# Patient Record
Sex: FEMALE | Race: WHITE | HISPANIC OR LATINO | URBAN - METROPOLITAN AREA
[De-identification: names, ages, dates, MRNs, and addresses within clinical notes are randomized per-mention and may not be internally consistent; named-entity substitution may affect disease eponyms.]

---

## 2017-09-13 ENCOUNTER — EMERGENCY (EMERGENCY)
Facility: HOSPITAL | Age: 47
LOS: 1 days | Discharge: HOME | End: 2017-09-13
Attending: EMERGENCY MEDICINE | Admitting: EMERGENCY MEDICINE
Payer: MEDICAID

## 2017-09-13 VITALS
RESPIRATION RATE: 15 BRPM | HEART RATE: 74 BPM | TEMPERATURE: 98 F | OXYGEN SATURATION: 98 % | SYSTOLIC BLOOD PRESSURE: 114 MMHG

## 2017-09-13 VITALS
HEIGHT: 64 IN | SYSTOLIC BLOOD PRESSURE: 111 MMHG | TEMPERATURE: 98 F | RESPIRATION RATE: 16 BRPM | HEART RATE: 84 BPM | WEIGHT: 199.96 LBS | OXYGEN SATURATION: 97 % | DIASTOLIC BLOOD PRESSURE: 77 MMHG

## 2017-09-13 DIAGNOSIS — Z98.890 OTHER SPECIFIED POSTPROCEDURAL STATES: Chronic | ICD-10-CM

## 2017-09-13 DIAGNOSIS — Z98.89 OTHER SPECIFIED POSTPROCEDURAL STATES: Chronic | ICD-10-CM

## 2017-09-13 DIAGNOSIS — J45.909 UNSPECIFIED ASTHMA, UNCOMPLICATED: ICD-10-CM

## 2017-09-13 DIAGNOSIS — R10.32 LEFT LOWER QUADRANT PAIN: ICD-10-CM

## 2017-09-13 DIAGNOSIS — Z98.51 TUBAL LIGATION STATUS: Chronic | ICD-10-CM

## 2017-09-13 DIAGNOSIS — Z98.890 OTHER SPECIFIED POSTPROCEDURAL STATES: ICD-10-CM

## 2017-09-13 DIAGNOSIS — D25.9 LEIOMYOMA OF UTERUS, UNSPECIFIED: ICD-10-CM

## 2017-09-13 LAB — HCG SERPL-ACNC: <1 MIU/ML — SIGNIFICANT CHANGE UP

## 2017-09-13 PROCEDURE — 82248 BILIRUBIN DIRECT: CPT

## 2017-09-13 PROCEDURE — 84702 CHORIONIC GONADOTROPIN TEST: CPT

## 2017-09-13 PROCEDURE — 80053 COMPREHEN METABOLIC PANEL: CPT

## 2017-09-13 PROCEDURE — 99285 EMERGENCY DEPT VISIT HI MDM: CPT | Mod: 25

## 2017-09-13 PROCEDURE — 85027 COMPLETE CBC AUTOMATED: CPT

## 2017-09-13 PROCEDURE — 82150 ASSAY OF AMYLASE: CPT

## 2017-09-13 PROCEDURE — 83690 ASSAY OF LIPASE: CPT

## 2017-09-13 PROCEDURE — 96375 TX/PRO/DX INJ NEW DRUG ADDON: CPT

## 2017-09-13 PROCEDURE — 74177 CT ABD & PELVIS W/CONTRAST: CPT | Mod: 26

## 2017-09-13 PROCEDURE — 81001 URINALYSIS AUTO W/SCOPE: CPT

## 2017-09-13 PROCEDURE — 74177 CT ABD & PELVIS W/CONTRAST: CPT

## 2017-09-13 PROCEDURE — 36415 COLL VENOUS BLD VENIPUNCTURE: CPT

## 2017-09-13 PROCEDURE — 99284 EMERGENCY DEPT VISIT MOD MDM: CPT | Mod: 25

## 2017-09-13 PROCEDURE — 96374 THER/PROPH/DIAG INJ IV PUSH: CPT

## 2017-09-13 RX ORDER — ONDANSETRON 8 MG/1
4 TABLET, FILM COATED ORAL ONCE
Qty: 0 | Refills: 0 | Status: COMPLETED | OUTPATIENT
Start: 2017-09-13 | End: 2017-09-13

## 2017-09-13 RX ORDER — IOHEXOL 300 MG/ML
30 INJECTION, SOLUTION INTRAVENOUS ONCE
Qty: 0 | Refills: 0 | Status: COMPLETED | OUTPATIENT
Start: 2017-09-13 | End: 2017-09-13

## 2017-09-13 RX ORDER — MORPHINE SULFATE 50 MG/1
4 CAPSULE, EXTENDED RELEASE ORAL ONCE
Qty: 0 | Refills: 0 | Status: DISCONTINUED | OUTPATIENT
Start: 2017-09-13 | End: 2017-09-13

## 2017-09-13 RX ORDER — SODIUM CHLORIDE 9 MG/ML
1000 INJECTION INTRAMUSCULAR; INTRAVENOUS; SUBCUTANEOUS ONCE
Qty: 0 | Refills: 0 | Status: COMPLETED | OUTPATIENT
Start: 2017-09-13 | End: 2017-09-13

## 2017-09-13 RX ADMIN — ONDANSETRON 4 MILLIGRAM(S): 8 TABLET, FILM COATED ORAL at 08:07

## 2017-09-13 RX ADMIN — MORPHINE SULFATE 4 MILLIGRAM(S): 50 CAPSULE, EXTENDED RELEASE ORAL at 08:08

## 2017-09-13 RX ADMIN — MORPHINE SULFATE 4 MILLIGRAM(S): 50 CAPSULE, EXTENDED RELEASE ORAL at 12:08

## 2017-09-13 RX ADMIN — IOHEXOL 30 MILLILITER(S): 300 INJECTION, SOLUTION INTRAVENOUS at 08:09

## 2017-09-13 RX ADMIN — SODIUM CHLORIDE 2000 MILLILITER(S): 9 INJECTION INTRAMUSCULAR; INTRAVENOUS; SUBCUTANEOUS at 08:09

## 2017-09-13 NOTE — ED ADULT NURSE REASSESSMENT NOTE - NS ED NURSE REASSESS COMMENT FT1
pt to CT with transportation
report received from JEMMA Chambers at 0700, assumed pt care. pt c/o LLQ pain, tender on palpation. medicated with morphine and zofran, pt started drinking for CT 0800. will continue to monitor

## 2017-09-13 NOTE — ED PROVIDER NOTE - OBJECTIVE STATEMENT
47 yo white female with 4-days of gradual onset and constant LLQ abdominal pains with slight nausea but no vomiting. No fever, chills, diarrhea, dysuria or hematuria. LMP normal and on time

## 2017-09-13 NOTE — ED ADULT TRIAGE NOTE - CHIEF COMPLAINT QUOTE
left flank/LLQ spreading to pelvis pain with nausea no vomiting, pain spreadin and increasing over the last 4 days.

## 2017-09-13 NOTE — ED PROVIDER NOTE - CONSTITUTIONAL, MLM
normal... Well appearing, overweight white female, well nourished, awake, alert, oriented to person, place, time/situation and in no apparent distress.

## 2017-09-13 NOTE — ED PROVIDER NOTE - CARE PLAN
Principal Discharge DX:	Left lower quadrant pain  Secondary Diagnosis:	Uterine leiomyoma, unspecified location

## 2017-10-09 ENCOUNTER — EMERGENCY (EMERGENCY)
Facility: HOSPITAL | Age: 47
LOS: 1 days | Discharge: HOME | End: 2017-10-09
Attending: EMERGENCY MEDICINE | Admitting: EMERGENCY MEDICINE
Payer: MEDICAID

## 2017-10-09 VITALS
OXYGEN SATURATION: 96 % | SYSTOLIC BLOOD PRESSURE: 134 MMHG | TEMPERATURE: 98 F | DIASTOLIC BLOOD PRESSURE: 87 MMHG | RESPIRATION RATE: 16 BRPM | HEART RATE: 92 BPM

## 2017-10-09 VITALS
WEIGHT: 195.11 LBS | RESPIRATION RATE: 18 BRPM | HEIGHT: 64 IN | OXYGEN SATURATION: 95 % | TEMPERATURE: 98 F | SYSTOLIC BLOOD PRESSURE: 128 MMHG | DIASTOLIC BLOOD PRESSURE: 88 MMHG | HEART RATE: 92 BPM

## 2017-10-09 DIAGNOSIS — Z98.89 OTHER SPECIFIED POSTPROCEDURAL STATES: Chronic | ICD-10-CM

## 2017-10-09 DIAGNOSIS — Z98.890 OTHER SPECIFIED POSTPROCEDURAL STATES: Chronic | ICD-10-CM

## 2017-10-09 DIAGNOSIS — Z98.51 TUBAL LIGATION STATUS: Chronic | ICD-10-CM

## 2017-10-09 PROCEDURE — 93010 ELECTROCARDIOGRAM REPORT: CPT

## 2017-10-09 PROCEDURE — 99285 EMERGENCY DEPT VISIT HI MDM: CPT | Mod: 25

## 2017-10-09 PROCEDURE — 93005 ELECTROCARDIOGRAM TRACING: CPT

## 2017-10-09 PROCEDURE — 94640 AIRWAY INHALATION TREATMENT: CPT

## 2017-10-09 PROCEDURE — 99284 EMERGENCY DEPT VISIT MOD MDM: CPT

## 2017-10-09 PROCEDURE — 71020: CPT | Mod: 26

## 2017-10-09 PROCEDURE — 71046 X-RAY EXAM CHEST 2 VIEWS: CPT

## 2017-10-09 RX ORDER — PSEUDOEPHEDRINE HCL 30 MG
30 TABLET ORAL ONCE
Qty: 0 | Refills: 0 | Status: COMPLETED | OUTPATIENT
Start: 2017-10-09 | End: 2017-10-09

## 2017-10-09 RX ORDER — IPRATROPIUM/ALBUTEROL SULFATE 18-103MCG
3 AEROSOL WITH ADAPTER (GRAM) INHALATION
Qty: 84 | Refills: 0 | OUTPATIENT
Start: 2017-10-09 | End: 2017-10-16

## 2017-10-09 RX ORDER — IPRATROPIUM/ALBUTEROL SULFATE 18-103MCG
3 AEROSOL WITH ADAPTER (GRAM) INHALATION ONCE
Qty: 0 | Refills: 0 | Status: COMPLETED | OUTPATIENT
Start: 2017-10-09 | End: 2017-10-09

## 2017-10-09 RX ORDER — IPRATROPIUM/ALBUTEROL SULFATE 18-103MCG
3 AEROSOL WITH ADAPTER (GRAM) INHALATION
Qty: 0 | Refills: 0 | Status: COMPLETED | OUTPATIENT
Start: 2017-10-09 | End: 2017-10-09

## 2017-10-09 RX ADMIN — Medication 3 MILLILITER(S): at 10:26

## 2017-10-09 RX ADMIN — Medication 3 MILLILITER(S): at 10:44

## 2017-10-09 RX ADMIN — Medication 60 MILLIGRAM(S): at 10:26

## 2017-10-09 RX ADMIN — Medication 3 MILLILITER(S): at 11:29

## 2017-10-09 RX ADMIN — Medication 30 MILLIGRAM(S): at 10:26

## 2017-10-09 NOTE — ED PROVIDER NOTE - PROGRESS NOTE DETAILS
lungs clearer now just a faint end of exp advential sound pt feels much better will get peak flow rpt dose of duoneb x 1 monitor and dc

## 2017-10-09 NOTE — ED PROVIDER NOTE - ENMT, MLM
Airway patent, Nasal mucosa clear. Mouth with reddened mucosa. Throat has no vesicles, no oropharyngeal exudates and uvula is midline. nasal congestion, trace r maxillary sinus pressure to percussion

## 2017-10-09 NOTE — ED PROVIDER NOTE - CARE PLAN
Principal Discharge DX:	Moderate asthma with exacerbation, unspecified whether persistent  Secondary Diagnosis:	URI, acute

## 2017-10-09 NOTE — ED ADULT NURSE NOTE - OBJECTIVE STATEMENT
pt states she is having difficulty since this morning , has used her nebulizer without relief. pt states she was recently diagnosed with sinusitis. pt with productive cough

## 2017-10-09 NOTE — ED PROVIDER NOTE - OBJECTIVE STATEMENT
Pt is a 47 yo f whose pmd is dr kirkland hx of asthma obesity c sectionx 2, endometrial ablation and tonsil smokes socially including yesterday, no allergies  she and daughters began to have cough and cold sx yesterday. went to a christening and while there was consuming etoh and smoking.  that night her cough and cold sx were much worse. she tried otc cold med but slept poorly. and this am had trouble breathing with chest tightness despite use of her nebs an mdi.  bib family for franc

## 2017-10-13 DIAGNOSIS — E66.9 OBESITY, UNSPECIFIED: ICD-10-CM

## 2017-10-13 DIAGNOSIS — R06.02 SHORTNESS OF BREATH: ICD-10-CM

## 2017-10-13 DIAGNOSIS — F17.210 NICOTINE DEPENDENCE, CIGARETTES, UNCOMPLICATED: ICD-10-CM

## 2017-10-13 DIAGNOSIS — J45.909 UNSPECIFIED ASTHMA, UNCOMPLICATED: ICD-10-CM

## 2017-10-13 DIAGNOSIS — F90.9 ATTENTION-DEFICIT HYPERACTIVITY DISORDER, UNSPECIFIED TYPE: ICD-10-CM

## 2017-10-13 DIAGNOSIS — J45.41 MODERATE PERSISTENT ASTHMA WITH (ACUTE) EXACERBATION: ICD-10-CM

## 2017-10-13 DIAGNOSIS — N39.0 URINARY TRACT INFECTION, SITE NOT SPECIFIED: ICD-10-CM

## 2018-03-28 ENCOUNTER — EMERGENCY (EMERGENCY)
Facility: HOSPITAL | Age: 48
LOS: 1 days | Discharge: HOME | End: 2018-03-28
Attending: STUDENT IN AN ORGANIZED HEALTH CARE EDUCATION/TRAINING PROGRAM | Admitting: STUDENT IN AN ORGANIZED HEALTH CARE EDUCATION/TRAINING PROGRAM
Payer: MEDICAID

## 2018-03-28 VITALS
TEMPERATURE: 99 F | WEIGHT: 199.96 LBS | SYSTOLIC BLOOD PRESSURE: 141 MMHG | HEIGHT: 63 IN | RESPIRATION RATE: 18 BRPM | OXYGEN SATURATION: 98 % | HEART RATE: 100 BPM | DIASTOLIC BLOOD PRESSURE: 95 MMHG

## 2018-03-28 DIAGNOSIS — Z98.890 OTHER SPECIFIED POSTPROCEDURAL STATES: Chronic | ICD-10-CM

## 2018-03-28 DIAGNOSIS — Z98.89 OTHER SPECIFIED POSTPROCEDURAL STATES: Chronic | ICD-10-CM

## 2018-03-28 DIAGNOSIS — Z98.51 TUBAL LIGATION STATUS: Chronic | ICD-10-CM

## 2018-03-28 PROCEDURE — 99283 EMERGENCY DEPT VISIT LOW MDM: CPT

## 2018-03-28 RX ADMIN — Medication 40 MILLIGRAM(S): at 05:47

## 2018-03-28 NOTE — ED PROVIDER NOTE - PROGRESS NOTE DETAILS
Explained to patient that she has only taken meds for 1 dose and she must complete the course.  Can take NSAIDS for pain.  Also, will increase her dose of steroids and advised f/u with PMD. Patient expressed understanding

## 2018-03-28 NOTE — ED ADULT NURSE NOTE - OBJECTIVE STATEMENT
patient states she was treated for sinus infection today and was having a lot of discomfort to face and increased mucus that she could not sleep. Speaking clearly, no SOB

## 2018-03-28 NOTE — ED PROVIDER NOTE - OBJECTIVE STATEMENT
47 year old female with a history of chronic sinusitis presents with facial and nasal pain typical of her sinus infections.  Symptoms started yesterday and she went to urgent care.  She received augmentin, prednisone (40 mg for 4 days) and nasal spray. She took 1 dose of augment and prednisone so far. States that her pain has not improved.  She has persistent facial pain and rhinorrhea preventing her from sleeping.  No headache or blurry vision.  She feels like her steroids were under-dosed.  PMD Dr. Chaney

## 2018-03-28 NOTE — ED ADULT TRIAGE NOTE - CHIEF COMPLAINT QUOTE
"I have pain in my nose, I have a sinus infection I started augmentin, prednisone and nose spray yesterday and I still have sinus pressure"

## 2018-03-28 NOTE — ED PROVIDER NOTE - ENMT, MLM
Airway patent, Nasal mucosa boggy. Mouth with normal mucosa. Throat has no vesicles, no oropharyngeal exudates and uvula is midline.  Maxillary sinus pressure b/l

## 2018-03-28 NOTE — ED ADULT NURSE REASSESSMENT NOTE - NS ED NURSE REASSESS COMMENT FT1
patient was feeling somewhat better after using warm pack to face, and states she will call her primary MD today, does not to wait to sign discharge papers. Dr El spoke with patient regarding d/c prior to leaving. Patient d/c at this time well appearing, states she cannot stay as her  needs to go to work.

## 2018-04-15 ENCOUNTER — EMERGENCY (EMERGENCY)
Facility: HOSPITAL | Age: 48
LOS: 1 days | Discharge: HOME | End: 2018-04-15
Attending: EMERGENCY MEDICINE | Admitting: EMERGENCY MEDICINE
Payer: MEDICAID

## 2018-04-15 VITALS
RESPIRATION RATE: 18 BRPM | SYSTOLIC BLOOD PRESSURE: 132 MMHG | HEART RATE: 70 BPM | OXYGEN SATURATION: 96 % | DIASTOLIC BLOOD PRESSURE: 86 MMHG | TEMPERATURE: 99 F

## 2018-04-15 VITALS
HEART RATE: 78 BPM | HEIGHT: 64 IN | DIASTOLIC BLOOD PRESSURE: 73 MMHG | TEMPERATURE: 98 F | SYSTOLIC BLOOD PRESSURE: 134 MMHG | OXYGEN SATURATION: 99 % | WEIGHT: 199.96 LBS | RESPIRATION RATE: 18 BRPM

## 2018-04-15 DIAGNOSIS — Z98.890 OTHER SPECIFIED POSTPROCEDURAL STATES: Chronic | ICD-10-CM

## 2018-04-15 DIAGNOSIS — Z98.89 OTHER SPECIFIED POSTPROCEDURAL STATES: Chronic | ICD-10-CM

## 2018-04-15 DIAGNOSIS — Z98.51 TUBAL LIGATION STATUS: Chronic | ICD-10-CM

## 2018-04-15 LAB
ALBUMIN SERPL ELPH-MCNC: 3.3 G/DL — SIGNIFICANT CHANGE UP (ref 3.3–5)
ALP SERPL-CCNC: 71 U/L — SIGNIFICANT CHANGE UP (ref 40–120)
ALT FLD-CCNC: 20 U/L — SIGNIFICANT CHANGE UP (ref 12–78)
ANION GAP SERPL CALC-SCNC: 8 MMOL/L — SIGNIFICANT CHANGE UP (ref 5–17)
APPEARANCE UR: CLEAR — SIGNIFICANT CHANGE UP
AST SERPL-CCNC: 8 U/L — LOW (ref 15–37)
BILIRUB SERPL-MCNC: 0.1 MG/DL — LOW (ref 0.2–1.2)
BILIRUB UR-MCNC: NEGATIVE — SIGNIFICANT CHANGE UP
BUN SERPL-MCNC: 20 MG/DL — SIGNIFICANT CHANGE UP (ref 7–23)
CALCIUM SERPL-MCNC: 8.9 MG/DL — SIGNIFICANT CHANGE UP (ref 8.5–10.1)
CHLORIDE SERPL-SCNC: 105 MMOL/L — SIGNIFICANT CHANGE UP (ref 96–108)
CO2 SERPL-SCNC: 27 MMOL/L — SIGNIFICANT CHANGE UP (ref 22–31)
COLOR SPEC: YELLOW — SIGNIFICANT CHANGE UP
CREAT SERPL-MCNC: 1.3 MG/DL — SIGNIFICANT CHANGE UP (ref 0.5–1.3)
DIFF PNL FLD: NEGATIVE — SIGNIFICANT CHANGE UP
GLUCOSE SERPL-MCNC: 179 MG/DL — HIGH (ref 70–99)
GLUCOSE UR QL: NEGATIVE — SIGNIFICANT CHANGE UP
HCG SERPL-ACNC: <1 MIU/ML — SIGNIFICANT CHANGE UP
HCT VFR BLD CALC: 36.9 % — SIGNIFICANT CHANGE UP (ref 34.5–45)
HGB BLD-MCNC: 12.6 G/DL — SIGNIFICANT CHANGE UP (ref 11.5–15.5)
KETONES UR-MCNC: NEGATIVE — SIGNIFICANT CHANGE UP
LEUKOCYTE ESTERASE UR-ACNC: NEGATIVE — SIGNIFICANT CHANGE UP
MCHC RBC-ENTMCNC: 30.8 PG — SIGNIFICANT CHANGE UP (ref 27–34)
MCHC RBC-ENTMCNC: 34.3 GM/DL — SIGNIFICANT CHANGE UP (ref 32–36)
MCV RBC AUTO: 89.9 FL — SIGNIFICANT CHANGE UP (ref 80–100)
NITRITE UR-MCNC: NEGATIVE — SIGNIFICANT CHANGE UP
PH UR: 6.5 — SIGNIFICANT CHANGE UP (ref 5–8)
PLATELET # BLD AUTO: 242 K/UL — SIGNIFICANT CHANGE UP (ref 150–400)
POTASSIUM SERPL-MCNC: 4.1 MMOL/L — SIGNIFICANT CHANGE UP (ref 3.5–5.3)
POTASSIUM SERPL-SCNC: 4.1 MMOL/L — SIGNIFICANT CHANGE UP (ref 3.5–5.3)
PROT SERPL-MCNC: 6.9 G/DL — SIGNIFICANT CHANGE UP (ref 6–8.3)
PROT UR-MCNC: NEGATIVE — SIGNIFICANT CHANGE UP
RBC # BLD: 4.1 M/UL — SIGNIFICANT CHANGE UP (ref 3.8–5.2)
RBC # FLD: 12 % — SIGNIFICANT CHANGE UP (ref 10.3–14.5)
SODIUM SERPL-SCNC: 140 MMOL/L — SIGNIFICANT CHANGE UP (ref 135–145)
SP GR SPEC: 1.02 — SIGNIFICANT CHANGE UP (ref 1.01–1.02)
UROBILINOGEN FLD QL: NEGATIVE — SIGNIFICANT CHANGE UP
WBC # BLD: 12.9 K/UL — HIGH (ref 3.8–10.5)
WBC # FLD AUTO: 12.9 K/UL — HIGH (ref 3.8–10.5)

## 2018-04-15 PROCEDURE — 99284 EMERGENCY DEPT VISIT MOD MDM: CPT | Mod: 25

## 2018-04-15 PROCEDURE — 99285 EMERGENCY DEPT VISIT HI MDM: CPT

## 2018-04-15 PROCEDURE — 85027 COMPLETE CBC AUTOMATED: CPT

## 2018-04-15 PROCEDURE — 36415 COLL VENOUS BLD VENIPUNCTURE: CPT

## 2018-04-15 PROCEDURE — 80053 COMPREHEN METABOLIC PANEL: CPT

## 2018-04-15 PROCEDURE — 94640 AIRWAY INHALATION TREATMENT: CPT

## 2018-04-15 PROCEDURE — 81003 URINALYSIS AUTO W/O SCOPE: CPT

## 2018-04-15 PROCEDURE — 96376 TX/PRO/DX INJ SAME DRUG ADON: CPT

## 2018-04-15 PROCEDURE — 96374 THER/PROPH/DIAG INJ IV PUSH: CPT

## 2018-04-15 PROCEDURE — 76830 TRANSVAGINAL US NON-OB: CPT | Mod: 26

## 2018-04-15 PROCEDURE — 76830 TRANSVAGINAL US NON-OB: CPT

## 2018-04-15 PROCEDURE — 84702 CHORIONIC GONADOTROPIN TEST: CPT

## 2018-04-15 PROCEDURE — 96375 TX/PRO/DX INJ NEW DRUG ADDON: CPT

## 2018-04-15 RX ORDER — IPRATROPIUM/ALBUTEROL SULFATE 18-103MCG
3 AEROSOL WITH ADAPTER (GRAM) INHALATION ONCE
Qty: 0 | Refills: 0 | Status: COMPLETED | OUTPATIENT
Start: 2018-04-15 | End: 2018-04-15

## 2018-04-15 RX ORDER — SODIUM CHLORIDE 9 MG/ML
1000 INJECTION INTRAMUSCULAR; INTRAVENOUS; SUBCUTANEOUS ONCE
Qty: 0 | Refills: 0 | Status: COMPLETED | OUTPATIENT
Start: 2018-04-15 | End: 2018-04-15

## 2018-04-15 RX ORDER — ONDANSETRON 8 MG/1
4 TABLET, FILM COATED ORAL ONCE
Qty: 0 | Refills: 0 | Status: COMPLETED | OUTPATIENT
Start: 2018-04-15 | End: 2018-04-15

## 2018-04-15 RX ORDER — KETOROLAC TROMETHAMINE 30 MG/ML
30 SYRINGE (ML) INJECTION ONCE
Qty: 0 | Refills: 0 | Status: DISCONTINUED | OUTPATIENT
Start: 2018-04-15 | End: 2018-04-15

## 2018-04-15 RX ORDER — HYDROMORPHONE HYDROCHLORIDE 2 MG/ML
0.5 INJECTION INTRAMUSCULAR; INTRAVENOUS; SUBCUTANEOUS ONCE
Qty: 0 | Refills: 0 | Status: DISCONTINUED | OUTPATIENT
Start: 2018-04-15 | End: 2018-04-15

## 2018-04-15 RX ADMIN — ONDANSETRON 4 MILLIGRAM(S): 8 TABLET, FILM COATED ORAL at 21:25

## 2018-04-15 RX ADMIN — HYDROMORPHONE HYDROCHLORIDE 0.5 MILLIGRAM(S): 2 INJECTION INTRAMUSCULAR; INTRAVENOUS; SUBCUTANEOUS at 21:24

## 2018-04-15 RX ADMIN — Medication 3 MILLILITER(S): at 21:24

## 2018-04-15 RX ADMIN — Medication 30 MILLIGRAM(S): at 20:08

## 2018-04-15 RX ADMIN — HYDROMORPHONE HYDROCHLORIDE 0.5 MILLIGRAM(S): 2 INJECTION INTRAMUSCULAR; INTRAVENOUS; SUBCUTANEOUS at 21:39

## 2018-04-15 RX ADMIN — ONDANSETRON 4 MILLIGRAM(S): 8 TABLET, FILM COATED ORAL at 20:04

## 2018-04-15 RX ADMIN — Medication 30 MILLIGRAM(S): at 20:23

## 2018-04-15 RX ADMIN — SODIUM CHLORIDE 1000 MILLILITER(S): 9 INJECTION INTRAMUSCULAR; INTRAVENOUS; SUBCUTANEOUS at 20:05

## 2018-04-15 NOTE — ED ADULT NURSE NOTE - OBJECTIVE STATEMENT
Patient complaining of pain to right lower groin area radiating to the lower back after having intercourse in am. Pt. stated pain has increased throughout the day. Pt. stated she has a history of kidney stones and fibroid necrosis. Pt. stated she has had intermittent spotting when menstruating and noted decreased urine output today. Denies nausea, vomiting, diarrhea or fever.

## 2018-04-15 NOTE — ED ADULT NURSE REASSESSMENT NOTE - NS ED NURSE REASSESS COMMENT FT1
Awaiting urine from pt. Pt. stated she does not have to urinate at this time. Per Dr. Haq pt. provided with po fluids.

## 2018-04-15 NOTE — ED PROVIDER NOTE - PROGRESS NOTE DETAILS
patient feeling better, abdomen soft, labs, us discussed, states she will f/u with her own GYN, understands to return to ER for fever, severe pain, vomiting

## 2018-04-15 NOTE — ED PROVIDER NOTE - OBJECTIVE STATEMENT
47 female presents to ER c/o RLQ pain, radiating to groin/back after having sexual intercourse with significant other this morning, getting progressively worse with intermitant nausea, did not take any pain meds prior to coming to ER.

## 2018-04-15 NOTE — ED ADULT TRIAGE NOTE - CHIEF COMPLAINT QUOTE
Patient complaining of pain to right lower groin area radiating to the lower back with pain level 7/10 started this afternoon denies nausea, vomiting and diarrhea stated she had history of kidney stones.

## 2018-08-06 ENCOUNTER — TRANSCRIPTION ENCOUNTER (OUTPATIENT)
Age: 48
End: 2018-08-06

## 2019-07-30 ENCOUNTER — EMERGENCY (EMERGENCY)
Facility: HOSPITAL | Age: 49
LOS: 1 days | Discharge: HOME | End: 2019-07-30
Attending: EMERGENCY MEDICINE | Admitting: EMERGENCY MEDICINE
Payer: MEDICAID

## 2019-07-30 VITALS
OXYGEN SATURATION: 97 % | RESPIRATION RATE: 17 BRPM | DIASTOLIC BLOOD PRESSURE: 60 MMHG | HEIGHT: 64 IN | WEIGHT: 199.96 LBS | HEART RATE: 87 BPM | SYSTOLIC BLOOD PRESSURE: 104 MMHG | TEMPERATURE: 99 F

## 2019-07-30 VITALS
SYSTOLIC BLOOD PRESSURE: 120 MMHG | HEART RATE: 73 BPM | TEMPERATURE: 98 F | RESPIRATION RATE: 16 BRPM | OXYGEN SATURATION: 96 % | DIASTOLIC BLOOD PRESSURE: 84 MMHG

## 2019-07-30 DIAGNOSIS — Z98.89 OTHER SPECIFIED POSTPROCEDURAL STATES: Chronic | ICD-10-CM

## 2019-07-30 DIAGNOSIS — Z98.51 TUBAL LIGATION STATUS: Chronic | ICD-10-CM

## 2019-07-30 DIAGNOSIS — Z98.890 OTHER SPECIFIED POSTPROCEDURAL STATES: Chronic | ICD-10-CM

## 2019-07-30 LAB
ALBUMIN SERPL ELPH-MCNC: 3.3 G/DL — SIGNIFICANT CHANGE UP (ref 3.3–5)
ALP SERPL-CCNC: 62 U/L — SIGNIFICANT CHANGE UP (ref 40–120)
ALT FLD-CCNC: 22 U/L — SIGNIFICANT CHANGE UP (ref 12–78)
ANION GAP SERPL CALC-SCNC: 5 MMOL/L — SIGNIFICANT CHANGE UP (ref 5–17)
APPEARANCE UR: CLEAR — SIGNIFICANT CHANGE UP
AST SERPL-CCNC: 10 U/L — LOW (ref 15–37)
BASOPHILS # BLD AUTO: 0.06 K/UL — SIGNIFICANT CHANGE UP (ref 0–0.2)
BASOPHILS NFR BLD AUTO: 0.5 % — SIGNIFICANT CHANGE UP (ref 0–2)
BILIRUB SERPL-MCNC: 0.2 MG/DL — SIGNIFICANT CHANGE UP (ref 0.2–1.2)
BILIRUB UR-MCNC: NEGATIVE — SIGNIFICANT CHANGE UP
BUN SERPL-MCNC: 20 MG/DL — SIGNIFICANT CHANGE UP (ref 7–23)
CALCIUM SERPL-MCNC: 9.1 MG/DL — SIGNIFICANT CHANGE UP (ref 8.5–10.1)
CHLORIDE SERPL-SCNC: 105 MMOL/L — SIGNIFICANT CHANGE UP (ref 96–108)
CO2 SERPL-SCNC: 29 MMOL/L — SIGNIFICANT CHANGE UP (ref 22–31)
COLOR SPEC: YELLOW — SIGNIFICANT CHANGE UP
CREAT SERPL-MCNC: 0.95 MG/DL — SIGNIFICANT CHANGE UP (ref 0.5–1.3)
DIFF PNL FLD: NEGATIVE — SIGNIFICANT CHANGE UP
EOSINOPHIL # BLD AUTO: 0.62 K/UL — HIGH (ref 0–0.5)
EOSINOPHIL NFR BLD AUTO: 5.4 % — SIGNIFICANT CHANGE UP (ref 0–6)
GLUCOSE SERPL-MCNC: 153 MG/DL — HIGH (ref 70–99)
GLUCOSE UR QL: NEGATIVE — SIGNIFICANT CHANGE UP
HCT VFR BLD CALC: 39.8 % — SIGNIFICANT CHANGE UP (ref 34.5–45)
HGB BLD-MCNC: 13 G/DL — SIGNIFICANT CHANGE UP (ref 11.5–15.5)
IMM GRANULOCYTES NFR BLD AUTO: 1.5 % — SIGNIFICANT CHANGE UP (ref 0–1.5)
KETONES UR-MCNC: NEGATIVE — SIGNIFICANT CHANGE UP
LACTATE SERPL-SCNC: 1.8 MMOL/L — SIGNIFICANT CHANGE UP (ref 0.7–2)
LEUKOCYTE ESTERASE UR-ACNC: NEGATIVE — SIGNIFICANT CHANGE UP
LIDOCAIN IGE QN: 123 U/L — SIGNIFICANT CHANGE UP (ref 73–393)
LYMPHOCYTES # BLD AUTO: 2.34 K/UL — SIGNIFICANT CHANGE UP (ref 1–3.3)
LYMPHOCYTES # BLD AUTO: 20.6 % — SIGNIFICANT CHANGE UP (ref 13–44)
MCHC RBC-ENTMCNC: 29.7 PG — SIGNIFICANT CHANGE UP (ref 27–34)
MCHC RBC-ENTMCNC: 32.7 GM/DL — SIGNIFICANT CHANGE UP (ref 32–36)
MCV RBC AUTO: 90.9 FL — SIGNIFICANT CHANGE UP (ref 80–100)
MONOCYTES # BLD AUTO: 0.79 K/UL — SIGNIFICANT CHANGE UP (ref 0–0.9)
MONOCYTES NFR BLD AUTO: 6.9 % — SIGNIFICANT CHANGE UP (ref 2–14)
NEUTROPHILS # BLD AUTO: 7.4 K/UL — SIGNIFICANT CHANGE UP (ref 1.8–7.4)
NEUTROPHILS NFR BLD AUTO: 65.1 % — SIGNIFICANT CHANGE UP (ref 43–77)
NITRITE UR-MCNC: NEGATIVE — SIGNIFICANT CHANGE UP
NRBC # BLD: 0 /100 WBCS — SIGNIFICANT CHANGE UP (ref 0–0)
PH UR: 5 — SIGNIFICANT CHANGE UP (ref 5–8)
PLATELET # BLD AUTO: 299 K/UL — SIGNIFICANT CHANGE UP (ref 150–400)
POTASSIUM SERPL-MCNC: 4.4 MMOL/L — SIGNIFICANT CHANGE UP (ref 3.5–5.3)
POTASSIUM SERPL-SCNC: 4.4 MMOL/L — SIGNIFICANT CHANGE UP (ref 3.5–5.3)
PROT SERPL-MCNC: 6.6 G/DL — SIGNIFICANT CHANGE UP (ref 6–8.3)
PROT UR-MCNC: NEGATIVE — SIGNIFICANT CHANGE UP
RBC # BLD: 4.38 M/UL — SIGNIFICANT CHANGE UP (ref 3.8–5.2)
RBC # FLD: 13 % — SIGNIFICANT CHANGE UP (ref 10.3–14.5)
SODIUM SERPL-SCNC: 139 MMOL/L — SIGNIFICANT CHANGE UP (ref 135–145)
SP GR SPEC: 1.02 — SIGNIFICANT CHANGE UP (ref 1.01–1.02)
UROBILINOGEN FLD QL: NEGATIVE — SIGNIFICANT CHANGE UP
WBC # BLD: 11.38 K/UL — HIGH (ref 3.8–10.5)
WBC # FLD AUTO: 11.38 K/UL — HIGH (ref 3.8–10.5)

## 2019-07-30 PROCEDURE — 76856 US EXAM PELVIC COMPLETE: CPT | Mod: 26

## 2019-07-30 PROCEDURE — 81003 URINALYSIS AUTO W/O SCOPE: CPT

## 2019-07-30 PROCEDURE — 99284 EMERGENCY DEPT VISIT MOD MDM: CPT | Mod: 25

## 2019-07-30 PROCEDURE — 76856 US EXAM PELVIC COMPLETE: CPT

## 2019-07-30 PROCEDURE — 80053 COMPREHEN METABOLIC PANEL: CPT

## 2019-07-30 PROCEDURE — 83690 ASSAY OF LIPASE: CPT

## 2019-07-30 PROCEDURE — 96374 THER/PROPH/DIAG INJ IV PUSH: CPT | Mod: XU

## 2019-07-30 PROCEDURE — 96375 TX/PRO/DX INJ NEW DRUG ADDON: CPT

## 2019-07-30 PROCEDURE — 74177 CT ABD & PELVIS W/CONTRAST: CPT | Mod: 26

## 2019-07-30 PROCEDURE — 36415 COLL VENOUS BLD VENIPUNCTURE: CPT

## 2019-07-30 PROCEDURE — 96361 HYDRATE IV INFUSION ADD-ON: CPT

## 2019-07-30 PROCEDURE — 99285 EMERGENCY DEPT VISIT HI MDM: CPT

## 2019-07-30 PROCEDURE — 85027 COMPLETE CBC AUTOMATED: CPT

## 2019-07-30 PROCEDURE — 83605 ASSAY OF LACTIC ACID: CPT

## 2019-07-30 PROCEDURE — 84702 CHORIONIC GONADOTROPIN TEST: CPT

## 2019-07-30 PROCEDURE — 87086 URINE CULTURE/COLONY COUNT: CPT

## 2019-07-30 PROCEDURE — 74177 CT ABD & PELVIS W/CONTRAST: CPT

## 2019-07-30 RX ORDER — KETOROLAC TROMETHAMINE 30 MG/ML
30 SYRINGE (ML) INJECTION ONCE
Refills: 0 | Status: DISCONTINUED | OUTPATIENT
Start: 2019-07-30 | End: 2019-07-30

## 2019-07-30 RX ORDER — IOHEXOL 300 MG/ML
30 INJECTION, SOLUTION INTRAVENOUS ONCE
Refills: 0 | Status: COMPLETED | OUTPATIENT
Start: 2019-07-30 | End: 2019-07-30

## 2019-07-30 RX ORDER — ONDANSETRON 8 MG/1
4 TABLET, FILM COATED ORAL ONCE
Refills: 0 | Status: COMPLETED | OUTPATIENT
Start: 2019-07-30 | End: 2019-07-30

## 2019-07-30 RX ORDER — MORPHINE SULFATE 50 MG/1
4 CAPSULE, EXTENDED RELEASE ORAL ONCE
Refills: 0 | Status: DISCONTINUED | OUTPATIENT
Start: 2019-07-30 | End: 2019-07-30

## 2019-07-30 RX ORDER — SODIUM CHLORIDE 9 MG/ML
1000 INJECTION INTRAMUSCULAR; INTRAVENOUS; SUBCUTANEOUS ONCE
Refills: 0 | Status: COMPLETED | OUTPATIENT
Start: 2019-07-30 | End: 2019-07-30

## 2019-07-30 RX ADMIN — Medication 30 MILLIGRAM(S): at 22:35

## 2019-07-30 RX ADMIN — ONDANSETRON 4 MILLIGRAM(S): 8 TABLET, FILM COATED ORAL at 18:11

## 2019-07-30 RX ADMIN — MORPHINE SULFATE 4 MILLIGRAM(S): 50 CAPSULE, EXTENDED RELEASE ORAL at 18:11

## 2019-07-30 RX ADMIN — SODIUM CHLORIDE 1000 MILLILITER(S): 9 INJECTION INTRAMUSCULAR; INTRAVENOUS; SUBCUTANEOUS at 18:12

## 2019-07-30 RX ADMIN — SODIUM CHLORIDE 1000 MILLILITER(S): 9 INJECTION INTRAMUSCULAR; INTRAVENOUS; SUBCUTANEOUS at 19:15

## 2019-07-30 RX ADMIN — IOHEXOL 30 MILLILITER(S): 300 INJECTION, SOLUTION INTRAVENOUS at 18:11

## 2019-07-30 RX ADMIN — Medication 30 MILLIGRAM(S): at 22:20

## 2019-07-30 NOTE — ED PROVIDER NOTE - CARE PROVIDER_API CALL
Zac Adkins)  Obstetrics and Gynecology  [address] [place] [address]  Phone: [phone]  Fax: [phone]  Follow Up Time:

## 2019-07-30 NOTE — ED PROVIDER NOTE - ATTENDING CONTRIBUTION TO CARE
48y F hx fibroids, kidney stones here w RLQ pain initially umbilical and now radiating to back. Pain assoc w nausea. no fever. No VD. +urinary sx. Ddx includes but is not limited to: kidney stone, uti/pyelo, appy, ovarian pathology. Labs, sono, imaging, meds, re-eval.

## 2019-07-30 NOTE — ED PROVIDER NOTE - NS ED ATTENDING STATEMENT MOD
Patient made aware I have personally performed a face to face diagnostic evaluation on this patient. I have reviewed the ACP note and agree with the history, exam and plan of care, except as noted.

## 2019-07-30 NOTE — ED PROVIDER NOTE - PROGRESS NOTE DETAILS
pt is currently comfortable in ED, in no distress, CT reviewed and shows enlarge fibroid uterus.  Will perform US and monitor. pt is currently comfortable in ED, US shows large fibroid.  pt was advised to FU with GYN, pt states she has a hx of fibroid.  Discussed with patient results of work up, strict return precautions, and need for follow up.  Patient expressed understanding and agrees with plan.  Patient informed to return to the ER immediately for any problems, concerns, or recurrent/worsening symptoms. Pt also advised that ovaries are obstructed by enlarged uterus on sono so cannot comment on flow, however CT showed unremarkable adenxa BL which would speak against torsion as torsions usually occur in setting of large cyst/mass on ovary. Advised to FU with GYN and return if worsening pain. Currently comfortable after pain meds in ED.

## 2019-07-30 NOTE — ED PROVIDER NOTE - GASTROINTESTINAL, MLM
normoactive bowel sound, abdomen soft, tender to palpation on the RLQ with rebound tenderness, negative CVA tenderness noted.

## 2019-07-30 NOTE — ED ADULT NURSE NOTE - OBJECTIVE STATEMENT
pt with complaints of right sided abdominal pain from umbilicus down to LRQ and into groin also into back. pt states she has had some nausea but no vomiting.  pt denies pain with urination. pt states pain increases with movement.

## 2019-07-30 NOTE — ED PROVIDER NOTE - OBJECTIVE STATEMENT
49 y/o female, comes in c/o RLQ abdominal pain which started last night.  Pain is constant, currently an 8/10, started around the umbilicus before localizing to the RLQ and now radiates to the back.  pain worsened with movement, alleviated with resting, pt denies any fevers or chills, c/o nausea, denies any vomiting, took Tylenol with no relief.  pt c/o urinary urgency and frequency, denies any burning sensation on urination, denies any vaginal bleeding or discharge.  pt has a hx of kidney stones but states this feels a lot different.

## 2019-07-30 NOTE — ED ADULT NURSE REASSESSMENT NOTE - NS ED NURSE REASSESS COMMENT FT1
Received report from Melani EASTON. Patient resting and comfortable. Patient reports partial relief of pain. VSS. Safety and comfort measures provided and maintained.

## 2019-08-01 LAB
CULTURE RESULTS: SIGNIFICANT CHANGE UP
SPECIMEN SOURCE: SIGNIFICANT CHANGE UP

## 2019-08-02 ENCOUNTER — APPOINTMENT (OUTPATIENT)
Dept: OBGYN | Facility: CLINIC | Age: 49
End: 2019-08-02

## 2019-08-07 ENCOUNTER — TRANSCRIPTION ENCOUNTER (OUTPATIENT)
Age: 49
End: 2019-08-07

## 2019-08-07 ENCOUNTER — APPOINTMENT (OUTPATIENT)
Dept: OBGYN | Facility: CLINIC | Age: 49
End: 2019-08-07
Payer: MEDICAID

## 2019-08-07 DIAGNOSIS — Z01.419 ENCOUNTER FOR GYNECOLOGICAL EXAMINATION (GENERAL) (ROUTINE) W/OUT ABNORMAL FINDINGS: ICD-10-CM

## 2019-08-07 DIAGNOSIS — Z80.41 FAMILY HISTORY OF MALIGNANT NEOPLASM OF OVARY: ICD-10-CM

## 2019-08-07 PROCEDURE — 99203 OFFICE O/P NEW LOW 30 MIN: CPT | Mod: 25

## 2019-08-07 PROCEDURE — 99386 PREV VISIT NEW AGE 40-64: CPT

## 2019-08-08 LAB
C TRACH RRNA SPEC QL NAA+PROBE: NOT DETECTED
HPV HIGH+LOW RISK DNA PNL CVX: NOT DETECTED
N GONORRHOEA RRNA SPEC QL NAA+PROBE: NOT DETECTED
SOURCE TP AMPLIFICATION: NORMAL

## 2019-08-16 ENCOUNTER — TRANSCRIPTION ENCOUNTER (OUTPATIENT)
Age: 49
End: 2019-08-16

## 2019-08-21 ENCOUNTER — APPOINTMENT (OUTPATIENT)
Dept: OBGYN | Facility: CLINIC | Age: 49
End: 2019-08-21
Payer: MEDICAID

## 2019-08-21 PROCEDURE — 81025 URINE PREGNANCY TEST: CPT

## 2019-08-21 PROCEDURE — 99213 OFFICE O/P EST LOW 20 MIN: CPT | Mod: 25

## 2019-08-21 PROCEDURE — 58100 BIOPSY OF UTERUS LINING: CPT

## 2019-08-21 NOTE — PROCEDURE
[Endometrial Biopsy] : Endometrial biopsy [Irregular Bleeding] : irregular uterine bleeding [Risks] : risks [Benefits] : benefits [Alternatives] : alternatives [Patient] : patient [Infection] : infection [Bleeding] : bleeding [Allergic Reaction] : allergic reaction [Uterine Perforation] : uterine perforation [Pain] : pain [CONSENT OBTAINED] : written consent was obtained prior to the procedure. [Neg Pregnancy Test] : a pregnancy test was negative [Betadine] : Betadine [Pipelle] : a Pipelle endometrial suction curette [Easy Passage] : allowed easy passage of a uterine sound without dilation [Sent to Histology] : the specimen was place in buffered formalin and sent for pathlogy [Scant] : a scant [Tolerated Well] : the patient tolerated the procedure well [No Complications] : there were no complications [de-identified] : Marlene clamp

## 2019-08-21 NOTE — ASSESSMENT
[FreeTextEntry1] : Nothing per vagina x 1wk\par \par TVUS consistent with a 15wks fibroid uterus and b/l adnexa or EMS not visualized due to fibroids. Discussed with patient and aware plan is for hysterectomy and salpingectomy with possible oopherectomy if abnormalities are found intraop. Patient agreed and all questions were answered.\par \par RTO for preop counseling

## 2019-08-26 ENCOUNTER — RESULT REVIEW (OUTPATIENT)
Age: 49
End: 2019-08-26

## 2019-08-26 ENCOUNTER — OUTPATIENT (OUTPATIENT)
Dept: OUTPATIENT SERVICES | Facility: HOSPITAL | Age: 49
LOS: 1 days | End: 2019-08-26
Payer: MEDICAID

## 2019-08-26 VITALS
HEIGHT: 64 IN | SYSTOLIC BLOOD PRESSURE: 122 MMHG | HEART RATE: 81 BPM | OXYGEN SATURATION: 99 % | TEMPERATURE: 98 F | RESPIRATION RATE: 20 BRPM | DIASTOLIC BLOOD PRESSURE: 70 MMHG

## 2019-08-26 DIAGNOSIS — Z98.89 OTHER SPECIFIED POSTPROCEDURAL STATES: Chronic | ICD-10-CM

## 2019-08-26 DIAGNOSIS — Z98.890 OTHER SPECIFIED POSTPROCEDURAL STATES: Chronic | ICD-10-CM

## 2019-08-26 DIAGNOSIS — N93.9 ABNORMAL UTERINE AND VAGINAL BLEEDING, UNSPECIFIED: ICD-10-CM

## 2019-08-26 DIAGNOSIS — Z98.51 TUBAL LIGATION STATUS: Chronic | ICD-10-CM

## 2019-08-26 LAB
ANION GAP SERPL CALC-SCNC: 2 MMOL/L — LOW (ref 5–17)
APPEARANCE UR: ABNORMAL
APTT BLD: 30.7 SEC — SIGNIFICANT CHANGE UP (ref 27.5–36.3)
BASOPHILS # BLD AUTO: 0.03 K/UL — SIGNIFICANT CHANGE UP (ref 0–0.2)
BASOPHILS NFR BLD AUTO: 0.4 % — SIGNIFICANT CHANGE UP (ref 0–2)
BILIRUB UR-MCNC: NEGATIVE — SIGNIFICANT CHANGE UP
BUN SERPL-MCNC: 13 MG/DL — SIGNIFICANT CHANGE UP (ref 7–23)
CALCIUM SERPL-MCNC: 8.9 MG/DL — SIGNIFICANT CHANGE UP (ref 8.5–10.1)
CHLORIDE SERPL-SCNC: 107 MMOL/L — SIGNIFICANT CHANGE UP (ref 96–108)
CO2 SERPL-SCNC: 30 MMOL/L — SIGNIFICANT CHANGE UP (ref 22–31)
COLOR SPEC: YELLOW — SIGNIFICANT CHANGE UP
CREAT SERPL-MCNC: 0.76 MG/DL — SIGNIFICANT CHANGE UP (ref 0.5–1.3)
DIFF PNL FLD: ABNORMAL
EOSINOPHIL # BLD AUTO: 0.4 K/UL — SIGNIFICANT CHANGE UP (ref 0–0.5)
EOSINOPHIL NFR BLD AUTO: 5.5 % — SIGNIFICANT CHANGE UP (ref 0–6)
GLUCOSE SERPL-MCNC: 108 MG/DL — HIGH (ref 70–99)
GLUCOSE UR QL: NEGATIVE MG/DL — SIGNIFICANT CHANGE UP
HCT VFR BLD CALC: 39.4 % — SIGNIFICANT CHANGE UP (ref 34.5–45)
HGB BLD-MCNC: 12.8 G/DL — SIGNIFICANT CHANGE UP (ref 11.5–15.5)
IMM GRANULOCYTES NFR BLD AUTO: 0.7 % — SIGNIFICANT CHANGE UP (ref 0–1.5)
INR BLD: 1.08 RATIO — SIGNIFICANT CHANGE UP (ref 0.88–1.16)
KETONES UR-MCNC: NEGATIVE — SIGNIFICANT CHANGE UP
LEUKOCYTE ESTERASE UR-ACNC: NEGATIVE — SIGNIFICANT CHANGE UP
LYMPHOCYTES # BLD AUTO: 1.79 K/UL — SIGNIFICANT CHANGE UP (ref 1–3.3)
LYMPHOCYTES # BLD AUTO: 24.5 % — SIGNIFICANT CHANGE UP (ref 13–44)
MCHC RBC-ENTMCNC: 30.2 PG — SIGNIFICANT CHANGE UP (ref 27–34)
MCHC RBC-ENTMCNC: 32.5 GM/DL — SIGNIFICANT CHANGE UP (ref 32–36)
MCV RBC AUTO: 92.9 FL — SIGNIFICANT CHANGE UP (ref 80–100)
MONOCYTES # BLD AUTO: 0.49 K/UL — SIGNIFICANT CHANGE UP (ref 0–0.9)
MONOCYTES NFR BLD AUTO: 6.7 % — SIGNIFICANT CHANGE UP (ref 2–14)
NEUTROPHILS # BLD AUTO: 4.55 K/UL — SIGNIFICANT CHANGE UP (ref 1.8–7.4)
NEUTROPHILS NFR BLD AUTO: 62.2 % — SIGNIFICANT CHANGE UP (ref 43–77)
NITRITE UR-MCNC: NEGATIVE — SIGNIFICANT CHANGE UP
PH UR: 7 — SIGNIFICANT CHANGE UP (ref 5–8)
PLATELET # BLD AUTO: 239 K/UL — SIGNIFICANT CHANGE UP (ref 150–400)
POTASSIUM SERPL-MCNC: 4 MMOL/L — SIGNIFICANT CHANGE UP (ref 3.5–5.3)
POTASSIUM SERPL-SCNC: 4 MMOL/L — SIGNIFICANT CHANGE UP (ref 3.5–5.3)
PROT UR-MCNC: NEGATIVE MG/DL — SIGNIFICANT CHANGE UP
PROTHROM AB SERPL-ACNC: 12 SEC — SIGNIFICANT CHANGE UP (ref 10–12.9)
RBC # BLD: 4.24 M/UL — SIGNIFICANT CHANGE UP (ref 3.8–5.2)
RBC # FLD: 12.6 % — SIGNIFICANT CHANGE UP (ref 10.3–14.5)
SODIUM SERPL-SCNC: 139 MMOL/L — SIGNIFICANT CHANGE UP (ref 135–145)
SP GR SPEC: 1.01 — SIGNIFICANT CHANGE UP (ref 1.01–1.02)
UROBILINOGEN FLD QL: NEGATIVE MG/DL — SIGNIFICANT CHANGE UP
WBC # BLD: 7.31 K/UL — SIGNIFICANT CHANGE UP (ref 3.8–10.5)
WBC # FLD AUTO: 7.31 K/UL — SIGNIFICANT CHANGE UP (ref 3.8–10.5)

## 2019-08-26 PROCEDURE — 85025 COMPLETE CBC W/AUTO DIFF WBC: CPT

## 2019-08-26 PROCEDURE — 85730 THROMBOPLASTIN TIME PARTIAL: CPT

## 2019-08-26 PROCEDURE — 86850 RBC ANTIBODY SCREEN: CPT

## 2019-08-26 PROCEDURE — 85610 PROTHROMBIN TIME: CPT

## 2019-08-26 PROCEDURE — 83036 HEMOGLOBIN GLYCOSYLATED A1C: CPT

## 2019-08-26 PROCEDURE — 86901 BLOOD TYPING SEROLOGIC RH(D): CPT

## 2019-08-26 PROCEDURE — 93005 ELECTROCARDIOGRAM TRACING: CPT

## 2019-08-26 PROCEDURE — 36415 COLL VENOUS BLD VENIPUNCTURE: CPT

## 2019-08-26 PROCEDURE — 81001 URINALYSIS AUTO W/SCOPE: CPT

## 2019-08-26 PROCEDURE — G0463: CPT | Mod: 25

## 2019-08-26 PROCEDURE — 80048 BASIC METABOLIC PNL TOTAL CA: CPT

## 2019-08-26 PROCEDURE — 93010 ELECTROCARDIOGRAM REPORT: CPT

## 2019-08-26 PROCEDURE — 86900 BLOOD TYPING SEROLOGIC ABO: CPT

## 2019-08-26 RX ORDER — MONTELUKAST 4 MG/1
0 TABLET, CHEWABLE ORAL
Qty: 0 | Refills: 0 | DISCHARGE

## 2019-08-26 RX ORDER — FOLIC ACID 0.8 MG
0 TABLET ORAL
Qty: 0 | Refills: 0 | DISCHARGE

## 2019-08-26 RX ORDER — ALBUTEROL 90 UG/1
0 AEROSOL, METERED ORAL
Qty: 0 | Refills: 0 | DISCHARGE

## 2019-08-26 RX ORDER — ERGOCALCIFEROL 1.25 MG/1
0 CAPSULE ORAL
Qty: 0 | Refills: 0 | DISCHARGE

## 2019-08-26 NOTE — H&P PST ADULT - HISTORY OF PRESENT ILLNESS
49 y/o female with uterine fibroid. Complain of excessive menstrual bleeding. Scheduled for Total laparoscopic hysterectomy. 47 y/o female with uterine fibroid. Complain of excessive menstrual bleeding. Scheduled for Robotic Total laparoscopic hysterectomy, b/l salpingo-oophorectomy, possible supra cervical hysterectomy, exploratory laparotomy.

## 2019-08-26 NOTE — H&P PST ADULT - NSICDXPASTMEDICALHX_GEN_ALL_CORE_FT
PAST MEDICAL HISTORY:  ADHD (attention deficit hyperactivity disorder)     Asthma     Fibroids uterine    Menorrhagia     Obesity

## 2019-08-26 NOTE — H&P PST ADULT - ASSESSMENT
49 y/o female with uterine fibroid. Scheduled for Robotic Total laparoscopic hysterectomy, b/l salpingo-oophorectomy, possible supra cervical hysterectomy, exploratory laparotomy.  Plan  1. Stop all NSAIDS, herbal supplements and vitamins for 7 days.  2. NPO at midnight.  3. Use EZ sponges as directed  4. Labs, EKG done today. STAT urine pregnancy on admission.  5. PMD visit for optimization prior to surgery as per surgeon

## 2019-08-26 NOTE — H&P PST ADULT - NSICDXPASTSURGICALHX_GEN_ALL_CORE_FT
PAST SURGICAL HISTORY:  S/P  section     S/P endometrial ablation     S/P tonsillectomy     S/P tubal ligation

## 2019-08-27 DIAGNOSIS — N93.9 ABNORMAL UTERINE AND VAGINAL BLEEDING, UNSPECIFIED: ICD-10-CM

## 2019-08-27 PROBLEM — E66.9 OBESITY, UNSPECIFIED: Chronic | Status: ACTIVE | Noted: 2019-08-26

## 2019-08-27 LAB — HBA1C BLD-MCNC: 5.7 % — HIGH (ref 4–5.6)

## 2019-08-28 ENCOUNTER — APPOINTMENT (OUTPATIENT)
Dept: OBGYN | Facility: HOSPITAL | Age: 49
End: 2019-08-28

## 2019-08-30 ENCOUNTER — APPOINTMENT (OUTPATIENT)
Dept: FAMILY MEDICINE | Facility: CLINIC | Age: 49
End: 2019-08-30
Payer: MEDICAID

## 2019-08-30 VITALS
HEIGHT: 64 IN | HEART RATE: 92 BPM | WEIGHT: 202 LBS | TEMPERATURE: 98 F | OXYGEN SATURATION: 96 % | BODY MASS INDEX: 34.49 KG/M2

## 2019-08-30 VITALS — SYSTOLIC BLOOD PRESSURE: 110 MMHG | DIASTOLIC BLOOD PRESSURE: 65 MMHG

## 2019-08-30 PROCEDURE — 99203 OFFICE O/P NEW LOW 30 MIN: CPT

## 2019-09-03 ENCOUNTER — APPOINTMENT (OUTPATIENT)
Dept: FAMILY MEDICINE | Facility: CLINIC | Age: 49
End: 2019-09-03

## 2019-09-03 PROBLEM — Z80.41 FAMILY HISTORY OF MALIGNANT NEOPLASM OF OVARY: Status: ACTIVE | Noted: 2019-09-03

## 2019-09-03 NOTE — REVIEW OF SYSTEMS
[Pelvic Pain] : pelvic pain [Abn Vag Bleeding] : abnormal vaginal bleeding [Breast Pain] : breast pain [Nl] : Endocrine

## 2019-09-03 NOTE — PHYSICAL EXAM
[Awake] : awake [Alert] : alert [Soft] : soft [Oriented x3] : oriented to person, place, and time [Normal] : cervix [No Bleeding] : there was no active vaginal bleeding [Pap Obtained] : a Pap smear was performed [Enlarged ___ wks] : enlarged [unfilled] ~Uweeks [Uterine Adnexae] : were not tender and not enlarged [Acute Distress] : no acute distress [Mass] : no breast mass [Nipple Discharge] : no nipple discharge [Axillary LAD] : no axillary lymphadenopathy [Tender] : non tender

## 2019-09-06 ENCOUNTER — APPOINTMENT (OUTPATIENT)
Dept: OBGYN | Facility: HOSPITAL | Age: 49
End: 2019-09-06

## 2019-09-10 ENCOUNTER — APPOINTMENT (OUTPATIENT)
Dept: OBGYN | Facility: HOSPITAL | Age: 49
End: 2019-09-10

## 2019-09-10 ENCOUNTER — OUTPATIENT (OUTPATIENT)
Dept: INPATIENT UNIT | Facility: HOSPITAL | Age: 49
LOS: 1 days | Discharge: HOME | End: 2019-09-10
Payer: MEDICAID

## 2019-09-10 ENCOUNTER — RESULT REVIEW (OUTPATIENT)
Age: 49
End: 2019-09-10

## 2019-09-10 VITALS
DIASTOLIC BLOOD PRESSURE: 73 MMHG | TEMPERATURE: 99 F | WEIGHT: 201.94 LBS | HEIGHT: 64 IN | SYSTOLIC BLOOD PRESSURE: 135 MMHG | RESPIRATION RATE: 16 BRPM | OXYGEN SATURATION: 99 % | HEART RATE: 68 BPM

## 2019-09-10 DIAGNOSIS — F17.210 NICOTINE DEPENDENCE, CIGARETTES, UNCOMPLICATED: ICD-10-CM

## 2019-09-10 DIAGNOSIS — Z88.2 ALLERGY STATUS TO SULFONAMIDES: ICD-10-CM

## 2019-09-10 DIAGNOSIS — Z98.89 OTHER SPECIFIED POSTPROCEDURAL STATES: Chronic | ICD-10-CM

## 2019-09-10 DIAGNOSIS — N84.0 POLYP OF CORPUS UTERI: ICD-10-CM

## 2019-09-10 DIAGNOSIS — Z98.890 OTHER SPECIFIED POSTPROCEDURAL STATES: Chronic | ICD-10-CM

## 2019-09-10 DIAGNOSIS — D25.9 LEIOMYOMA OF UTERUS, UNSPECIFIED: ICD-10-CM

## 2019-09-10 DIAGNOSIS — N72 INFLAMMATORY DISEASE OF CERVIX UTERI: ICD-10-CM

## 2019-09-10 DIAGNOSIS — Z82.49 FAMILY HISTORY OF ISCHEMIC HEART DISEASE AND OTHER DISEASES OF THE CIRCULATORY SYSTEM: ICD-10-CM

## 2019-09-10 DIAGNOSIS — N93.9 ABNORMAL UTERINE AND VAGINAL BLEEDING, UNSPECIFIED: ICD-10-CM

## 2019-09-10 DIAGNOSIS — Z98.51 TUBAL LIGATION STATUS: ICD-10-CM

## 2019-09-10 DIAGNOSIS — E66.9 OBESITY, UNSPECIFIED: ICD-10-CM

## 2019-09-10 DIAGNOSIS — J45.909 UNSPECIFIED ASTHMA, UNCOMPLICATED: ICD-10-CM

## 2019-09-10 DIAGNOSIS — N92.0 EXCESSIVE AND FREQUENT MENSTRUATION WITH REGULAR CYCLE: ICD-10-CM

## 2019-09-10 DIAGNOSIS — N80.0 ENDOMETRIOSIS OF UTERUS: ICD-10-CM

## 2019-09-10 DIAGNOSIS — Z98.51 TUBAL LIGATION STATUS: Chronic | ICD-10-CM

## 2019-09-10 DIAGNOSIS — F98.5 ADULT ONSET FLUENCY DISORDER: ICD-10-CM

## 2019-09-10 LAB — HCG UR QL: NEGATIVE — SIGNIFICANT CHANGE UP

## 2019-09-10 PROCEDURE — S2900 ROBOTIC SURGICAL SYSTEM: CPT | Mod: NC

## 2019-09-10 PROCEDURE — 85025 COMPLETE CBC W/AUTO DIFF WBC: CPT

## 2019-09-10 PROCEDURE — 58573 TLH W/T/O UTERUS OVER 250 G: CPT | Mod: AS

## 2019-09-10 PROCEDURE — 58573 TLH W/T/O UTERUS OVER 250 G: CPT

## 2019-09-10 PROCEDURE — 86850 RBC ANTIBODY SCREEN: CPT

## 2019-09-10 PROCEDURE — S2900: CPT

## 2019-09-10 PROCEDURE — 86901 BLOOD TYPING SEROLOGIC RH(D): CPT

## 2019-09-10 PROCEDURE — C1889: CPT

## 2019-09-10 PROCEDURE — 81025 URINE PREGNANCY TEST: CPT

## 2019-09-10 PROCEDURE — 86900 BLOOD TYPING SEROLOGIC ABO: CPT

## 2019-09-10 PROCEDURE — 88307 TISSUE EXAM BY PATHOLOGIST: CPT | Mod: 26

## 2019-09-10 PROCEDURE — 36415 COLL VENOUS BLD VENIPUNCTURE: CPT

## 2019-09-10 PROCEDURE — 88307 TISSUE EXAM BY PATHOLOGIST: CPT

## 2019-09-10 PROCEDURE — 82962 GLUCOSE BLOOD TEST: CPT

## 2019-09-10 RX ORDER — IBUPROFEN 200 MG
800 TABLET ORAL EVERY 6 HOURS
Refills: 0 | Status: DISCONTINUED | OUTPATIENT
Start: 2019-09-10 | End: 2019-09-11

## 2019-09-10 RX ORDER — ONDANSETRON 8 MG/1
4 TABLET, FILM COATED ORAL EVERY 6 HOURS
Refills: 0 | Status: DISCONTINUED | OUTPATIENT
Start: 2019-09-10 | End: 2019-09-11

## 2019-09-10 RX ORDER — OXYCODONE HYDROCHLORIDE 5 MG/1
5 TABLET ORAL EVERY 4 HOURS
Refills: 0 | Status: DISCONTINUED | OUTPATIENT
Start: 2019-09-10 | End: 2019-09-11

## 2019-09-10 RX ORDER — ACETAMINOPHEN 500 MG
650 TABLET ORAL EVERY 6 HOURS
Refills: 0 | Status: DISCONTINUED | OUTPATIENT
Start: 2019-09-10 | End: 2019-09-11

## 2019-09-10 RX ORDER — ACETAMINOPHEN 500 MG
1000 TABLET ORAL ONCE
Refills: 0 | Status: DISCONTINUED | OUTPATIENT
Start: 2019-09-10 | End: 2019-09-10

## 2019-09-10 RX ORDER — TRAZODONE HCL 50 MG
100 TABLET ORAL ONCE
Refills: 0 | Status: COMPLETED | OUTPATIENT
Start: 2019-09-10 | End: 2019-09-10

## 2019-09-10 RX ORDER — FENTANYL CITRATE 50 UG/ML
50 INJECTION INTRAVENOUS
Refills: 0 | Status: DISCONTINUED | OUTPATIENT
Start: 2019-09-10 | End: 2019-09-10

## 2019-09-10 RX ORDER — HEPARIN SODIUM 5000 [USP'U]/ML
5000 INJECTION INTRAVENOUS; SUBCUTANEOUS EVERY 12 HOURS
Refills: 0 | Status: DISCONTINUED | OUTPATIENT
Start: 2019-09-10 | End: 2019-09-10

## 2019-09-10 RX ORDER — HEPARIN SODIUM 5000 [USP'U]/ML
5000 INJECTION INTRAVENOUS; SUBCUTANEOUS EVERY 12 HOURS
Refills: 0 | Status: DISCONTINUED | OUTPATIENT
Start: 2019-09-10 | End: 2019-09-11

## 2019-09-10 RX ORDER — OXYCODONE HYDROCHLORIDE 5 MG/1
10 TABLET ORAL ONCE
Refills: 0 | Status: DISCONTINUED | OUTPATIENT
Start: 2019-09-10 | End: 2019-09-10

## 2019-09-10 RX ORDER — HEPARIN SODIUM 5000 [USP'U]/ML
5000 INJECTION INTRAVENOUS; SUBCUTANEOUS ONCE
Refills: 0 | Status: COMPLETED | OUTPATIENT
Start: 2019-09-10 | End: 2019-09-10

## 2019-09-10 RX ORDER — SODIUM CHLORIDE 9 MG/ML
1000 INJECTION, SOLUTION INTRAVENOUS
Refills: 0 | Status: DISCONTINUED | OUTPATIENT
Start: 2019-09-10 | End: 2019-09-10

## 2019-09-10 RX ADMIN — OXYCODONE HYDROCHLORIDE 5 MILLIGRAM(S): 5 TABLET ORAL at 18:43

## 2019-09-10 RX ADMIN — FENTANYL CITRATE 50 MICROGRAM(S): 50 INJECTION INTRAVENOUS at 11:35

## 2019-09-10 RX ADMIN — OXYCODONE HYDROCHLORIDE 5 MILLIGRAM(S): 5 TABLET ORAL at 16:23

## 2019-09-10 RX ADMIN — HEPARIN SODIUM 5000 UNIT(S): 5000 INJECTION INTRAVENOUS; SUBCUTANEOUS at 17:02

## 2019-09-10 RX ADMIN — Medication 800 MILLIGRAM(S): at 17:03

## 2019-09-10 RX ADMIN — FENTANYL CITRATE 50 MICROGRAM(S): 50 INJECTION INTRAVENOUS at 11:08

## 2019-09-10 RX ADMIN — Medication 650 MILLIGRAM(S): at 17:03

## 2019-09-10 RX ADMIN — Medication 800 MILLIGRAM(S): at 12:52

## 2019-09-10 RX ADMIN — SODIUM CHLORIDE 75 MILLILITER(S): 9 INJECTION, SOLUTION INTRAVENOUS at 12:26

## 2019-09-10 RX ADMIN — OXYCODONE HYDROCHLORIDE 5 MILLIGRAM(S): 5 TABLET ORAL at 14:05

## 2019-09-10 RX ADMIN — OXYCODONE HYDROCHLORIDE 5 MILLIGRAM(S): 5 TABLET ORAL at 22:45

## 2019-09-10 RX ADMIN — Medication 800 MILLIGRAM(S): at 17:34

## 2019-09-10 RX ADMIN — OXYCODONE HYDROCHLORIDE 10 MILLIGRAM(S): 5 TABLET ORAL at 11:04

## 2019-09-10 RX ADMIN — FENTANYL CITRATE 50 MICROGRAM(S): 50 INJECTION INTRAVENOUS at 11:34

## 2019-09-10 RX ADMIN — Medication 100 MILLIGRAM(S): at 21:49

## 2019-09-10 RX ADMIN — OXYCODONE HYDROCHLORIDE 10 MILLIGRAM(S): 5 TABLET ORAL at 12:53

## 2019-09-10 RX ADMIN — Medication 800 MILLIGRAM(S): at 12:24

## 2019-09-10 RX ADMIN — Medication 650 MILLIGRAM(S): at 12:52

## 2019-09-10 RX ADMIN — OXYCODONE HYDROCHLORIDE 5 MILLIGRAM(S): 5 TABLET ORAL at 21:47

## 2019-09-10 RX ADMIN — HEPARIN SODIUM 5000 UNIT(S): 5000 INJECTION INTRAVENOUS; SUBCUTANEOUS at 07:32

## 2019-09-10 RX ADMIN — Medication 650 MILLIGRAM(S): at 17:34

## 2019-09-10 RX ADMIN — Medication 650 MILLIGRAM(S): at 12:25

## 2019-09-10 RX ADMIN — FENTANYL CITRATE 50 MICROGRAM(S): 50 INJECTION INTRAVENOUS at 10:58

## 2019-09-10 NOTE — ASU PREOP CHECKLIST - CHLOROHEXIDINE WASH 2
FONT COLOR=\"[id number]\">SKYLER FRANCO  : 1950  ACCOUNT:  [id number]  [phone]  Breo Ellipta          [facility]  as directed        aMson Hoover M.D.    KATELYN/rt - DD: 2017 - DT: 2017 - Job ID: [id number]              09-Sep-2019

## 2019-09-10 NOTE — BRIEF OPERATIVE NOTE - NSICDXBRIEFPROCEDURE_GEN_ALL_CORE_FT
PROCEDURES:  Bilateral salpingectomy 10-Sep-2019 10:42:00  Micaela Stanley  Hysterectomy, total, robot-assisted, laparoscopic, using da Analy Xi, with cystoscopy 10-Sep-2019 10:41:50  Micaela Stanley

## 2019-09-10 NOTE — BRIEF OPERATIVE NOTE - NSICDXBRIEFPREOP_GEN_ALL_CORE_FT
PRE-OP DIAGNOSIS:  Fibroid uterus 10-Sep-2019 10:42:22  Micaela Stanley  Heavy menstrual bleeding 10-Sep-2019 10:42:12  Micaela Stanley

## 2019-09-10 NOTE — BRIEF OPERATIVE NOTE - OPERATION/FINDINGS
EUA: 15 cm myomatous uterus, small cervix  Intraop: 15wk size uterus, myomatous--normal intraabdominal contents. BL ureters visualized.  Cysto: bladder integrity intact, bl UOs visualized jetting vigorously

## 2019-09-10 NOTE — BRIEF OPERATIVE NOTE - NSICDXBRIEFPOSTOP_GEN_ALL_CORE_FT
POST-OP DIAGNOSIS:  Fibroid uterus 10-Sep-2019 10:42:40  Micaela Stanley  Heavy menstrual bleeding 10-Sep-2019 10:42:33  Micaela Stanley

## 2019-09-10 NOTE — ASU PATIENT PROFILE, ADULT - PMH
ADHD (attention deficit hyperactivity disorder)    Asthma    Fibroids  uterine  Menorrhagia    Obesity

## 2019-09-11 ENCOUNTER — TRANSCRIPTION ENCOUNTER (OUTPATIENT)
Age: 49
End: 2019-09-11

## 2019-09-11 VITALS
HEART RATE: 74 BPM | DIASTOLIC BLOOD PRESSURE: 58 MMHG | OXYGEN SATURATION: 96 % | SYSTOLIC BLOOD PRESSURE: 102 MMHG | TEMPERATURE: 98 F

## 2019-09-11 LAB
BASOPHILS # BLD AUTO: 0.03 K/UL — SIGNIFICANT CHANGE UP (ref 0–0.2)
BASOPHILS NFR BLD AUTO: 0.3 % — SIGNIFICANT CHANGE UP (ref 0–2)
EOSINOPHIL # BLD AUTO: 0.31 K/UL — SIGNIFICANT CHANGE UP (ref 0–0.5)
EOSINOPHIL NFR BLD AUTO: 3.1 % — SIGNIFICANT CHANGE UP (ref 0–6)
HCT VFR BLD CALC: 34.1 % — LOW (ref 34.5–45)
HGB BLD-MCNC: 11.1 G/DL — LOW (ref 11.5–15.5)
IMM GRANULOCYTES NFR BLD AUTO: 0.4 % — SIGNIFICANT CHANGE UP (ref 0–1.5)
LYMPHOCYTES # BLD AUTO: 2.84 K/UL — SIGNIFICANT CHANGE UP (ref 1–3.3)
LYMPHOCYTES # BLD AUTO: 28.4 % — SIGNIFICANT CHANGE UP (ref 13–44)
MCHC RBC-ENTMCNC: 30.2 PG — SIGNIFICANT CHANGE UP (ref 27–34)
MCHC RBC-ENTMCNC: 32.6 GM/DL — SIGNIFICANT CHANGE UP (ref 32–36)
MCV RBC AUTO: 92.9 FL — SIGNIFICANT CHANGE UP (ref 80–100)
MONOCYTES # BLD AUTO: 0.87 K/UL — SIGNIFICANT CHANGE UP (ref 0–0.9)
MONOCYTES NFR BLD AUTO: 8.7 % — SIGNIFICANT CHANGE UP (ref 2–14)
NEUTROPHILS # BLD AUTO: 5.9 K/UL — SIGNIFICANT CHANGE UP (ref 1.8–7.4)
NEUTROPHILS NFR BLD AUTO: 59.1 % — SIGNIFICANT CHANGE UP (ref 43–77)
PLATELET # BLD AUTO: 228 K/UL — SIGNIFICANT CHANGE UP (ref 150–400)
RBC # BLD: 3.67 M/UL — LOW (ref 3.8–5.2)
RBC # FLD: 13.2 % — SIGNIFICANT CHANGE UP (ref 10.3–14.5)
WBC # BLD: 9.99 K/UL — SIGNIFICANT CHANGE UP (ref 3.8–10.5)
WBC # FLD AUTO: 9.99 K/UL — SIGNIFICANT CHANGE UP (ref 3.8–10.5)

## 2019-09-11 RX ORDER — ALBUTEROL 90 UG/1
2 AEROSOL, METERED ORAL
Qty: 0 | Refills: 0 | DISCHARGE

## 2019-09-11 RX ORDER — OXYCODONE HYDROCHLORIDE 5 MG/1
1 TABLET ORAL
Qty: 10 | Refills: 0
Start: 2019-09-11

## 2019-09-11 RX ORDER — IBUPROFEN 200 MG
1 TABLET ORAL
Qty: 0 | Refills: 0 | DISCHARGE
Start: 2019-09-11

## 2019-09-11 RX ORDER — ACETAMINOPHEN 500 MG
2 TABLET ORAL
Qty: 0 | Refills: 0 | DISCHARGE
Start: 2019-09-11

## 2019-09-11 RX ORDER — IBUPROFEN 200 MG
1 TABLET ORAL
Qty: 30 | Refills: 0
Start: 2019-09-11

## 2019-09-11 RX ORDER — OXYCODONE HYDROCHLORIDE 5 MG/1
1 TABLET ORAL
Qty: 0 | Refills: 0 | DISCHARGE
Start: 2019-09-11

## 2019-09-11 RX ADMIN — Medication 800 MILLIGRAM(S): at 05:21

## 2019-09-11 RX ADMIN — Medication 650 MILLIGRAM(S): at 05:21

## 2019-09-11 RX ADMIN — Medication 650 MILLIGRAM(S): at 00:37

## 2019-09-11 RX ADMIN — HEPARIN SODIUM 5000 UNIT(S): 5000 INJECTION INTRAVENOUS; SUBCUTANEOUS at 05:21

## 2019-09-11 RX ADMIN — Medication 800 MILLIGRAM(S): at 01:30

## 2019-09-11 RX ADMIN — Medication 650 MILLIGRAM(S): at 01:30

## 2019-09-11 RX ADMIN — Medication 800 MILLIGRAM(S): at 00:37

## 2019-09-11 RX ADMIN — OXYCODONE HYDROCHLORIDE 5 MILLIGRAM(S): 5 TABLET ORAL at 08:39

## 2019-09-11 NOTE — DISCHARGE NOTE PROVIDER - NSDCFUSCHEDAPPT_GEN_ALL_CORE_FT
SONU JUDD ; 09/17/2019 ; [facility] OB/ [address] ; 10/21/2019 ; NPP OB/ [address] SONU JUDD ; 10/21/2019 ; NPP OB/ [address]

## 2019-09-11 NOTE — DISCHARGE NOTE NURSING/CASE MANAGEMENT/SOCIAL WORK - PATIENT PORTAL LINK FT
You can access the FollowMyHealth Patient Portal offered by [facility] by registering at the following website: [email or web address]. By joining [employer]’s FollowMyHealth portal, you will also be able to view your health information using other applications (apps) compatible with our system.

## 2019-09-11 NOTE — DISCHARGE NOTE NURSING/CASE MANAGEMENT/SOCIAL WORK - NSDCPEEMAIL_GEN_ALL_CORE
[facility] for Tobacco Control email [email or web address]

## 2019-09-11 NOTE — DISCHARGE NOTE PROVIDER - HOSPITAL COURSE
47yo on POD#1 status post robotic TLH + BS + cystoscopy doing well. Tolerating diet and ambulation without difficulties.

## 2019-09-17 ENCOUNTER — APPOINTMENT (OUTPATIENT)
Dept: OBGYN | Facility: CLINIC | Age: 49
End: 2019-09-17

## 2019-10-23 ENCOUNTER — APPOINTMENT (OUTPATIENT)
Dept: OBGYN | Facility: CLINIC | Age: 49
End: 2019-10-23

## 2019-10-29 ENCOUNTER — APPOINTMENT (OUTPATIENT)
Dept: FAMILY MEDICINE | Facility: CLINIC | Age: 49
End: 2019-10-29

## 2019-11-06 NOTE — ED PROVIDER NOTE - CROS ED ROS STATEMENT
Spoke with mother about lipids being normal. She asked if the US came back I told her I hadn't got a result note about that yet but I would sent Dr Maza a message and give her a call back hopefully today... Mother thanked me and said I was a big help  
all other ROS negative except as per HPI

## 2019-12-05 ENCOUNTER — APPOINTMENT (OUTPATIENT)
Dept: FAMILY MEDICINE | Facility: CLINIC | Age: 49
End: 2019-12-05

## 2019-12-09 ENCOUNTER — APPOINTMENT (OUTPATIENT)
Dept: OBGYN | Facility: CLINIC | Age: 49
End: 2019-12-09

## 2020-01-08 ENCOUNTER — APPOINTMENT (OUTPATIENT)
Dept: OBGYN | Facility: CLINIC | Age: 50
End: 2020-01-08

## 2020-02-24 ENCOUNTER — APPOINTMENT (OUTPATIENT)
Dept: FAMILY MEDICINE | Facility: CLINIC | Age: 50
End: 2020-02-24
Payer: MEDICAID

## 2020-02-24 VITALS
TEMPERATURE: 98.1 F | WEIGHT: 204.2 LBS | HEIGHT: 64 IN | OXYGEN SATURATION: 98 % | BODY MASS INDEX: 34.86 KG/M2 | HEART RATE: 62 BPM

## 2020-02-24 VITALS — DIASTOLIC BLOOD PRESSURE: 86 MMHG | SYSTOLIC BLOOD PRESSURE: 135 MMHG

## 2020-02-24 PROCEDURE — 99214 OFFICE O/P EST MOD 30 MIN: CPT | Mod: 25

## 2020-02-24 PROCEDURE — 36415 COLL VENOUS BLD VENIPUNCTURE: CPT

## 2020-02-24 NOTE — ASSESSMENT
[FreeTextEntry1] : This is a dictation summary this 49-year-old white female Bria Villalpando who recently had surgery for a hysterectomy does come into for followup of medical issues she has a history of asthma on medication for asthma she is a smoker half a pack per day however she wants to stop and will be given medications to control her smoking with such fracture tablets with food examination her blood pressure was 135/86 heart had a regular rhythm lungs were essentially clear there was some very very mild wheeze medications above and lungs are clear she also has nail fungus toenail fungus which she wants treatment for however she has not had liver enzymes performed his of blood will be taken today to check in general her liver function test before she started on Sporanox over

## 2020-02-24 NOTE — PLAN
[FreeTextEntry1] : This is a continuation dictation for plan as mentioned she will be given a prescription for chance to ask tablets also for albuterol solution for nebulizer and Singulair 10 mg at bedtime she will also be given a prescription for a pulmonary evaluation for an outpatient nebulizers and she will return to the office in 6-8 weeks unless earlier changes in her left chest for shown any problem which should be resolved before the

## 2020-02-26 LAB
ALBUMIN SERPL ELPH-MCNC: 4.5 G/DL
ALP BLD-CCNC: 68 U/L
ALT SERPL-CCNC: 18 U/L
ANION GAP SERPL CALC-SCNC: 13 MMOL/L
AST SERPL-CCNC: 13 U/L
BASOPHILS # BLD AUTO: 0.04 K/UL
BASOPHILS NFR BLD AUTO: 0.6 %
BILIRUB SERPL-MCNC: 0.2 MG/DL
BUN SERPL-MCNC: 21 MG/DL
CALCIUM SERPL-MCNC: 9.3 MG/DL
CHLORIDE SERPL-SCNC: 103 MMOL/L
CHOLEST SERPL-MCNC: 141 MG/DL
CHOLEST/HDLC SERPL: 2.2 RATIO
CO2 SERPL-SCNC: 23 MMOL/L
CREAT SERPL-MCNC: 0.79 MG/DL
EOSINOPHIL # BLD AUTO: 0.31 K/UL
EOSINOPHIL NFR BLD AUTO: 4.3 %
GLUCOSE SERPL-MCNC: 114 MG/DL
HCT VFR BLD CALC: 39.7 %
HDLC SERPL-MCNC: 65 MG/DL
HGB BLD-MCNC: 13.5 G/DL
IMM GRANULOCYTES NFR BLD AUTO: 0.6 %
LDLC SERPL CALC-MCNC: 37 MG/DL
LYMPHOCYTES # BLD AUTO: 1.61 K/UL
LYMPHOCYTES NFR BLD AUTO: 22.5 %
MAN DIFF?: NORMAL
MCHC RBC-ENTMCNC: 32 PG
MCHC RBC-ENTMCNC: 34 GM/DL
MCV RBC AUTO: 94.1 FL
MONOCYTES # BLD AUTO: 0.68 K/UL
MONOCYTES NFR BLD AUTO: 9.5 %
NEUTROPHILS # BLD AUTO: 4.48 K/UL
NEUTROPHILS NFR BLD AUTO: 62.5 %
PLATELET # BLD AUTO: 211 K/UL
POTASSIUM SERPL-SCNC: 4.6 MMOL/L
PROT SERPL-MCNC: 6.7 G/DL
RBC # BLD: 4.22 M/UL
RBC # FLD: 14.3 %
SODIUM SERPL-SCNC: 139 MMOL/L
T4 FREE SERPL-MCNC: 1 NG/DL
TRIGL SERPL-MCNC: 197 MG/DL
TSH SERPL-ACNC: 0.67 UIU/ML
WBC # FLD AUTO: 7.16 K/UL

## 2020-03-26 ENCOUNTER — RX RENEWAL (OUTPATIENT)
Age: 50
End: 2020-03-26

## 2020-06-13 ENCOUNTER — RX RENEWAL (OUTPATIENT)
Age: 50
End: 2020-06-13

## 2020-07-17 ENCOUNTER — APPOINTMENT (OUTPATIENT)
Dept: PULMONOLOGY | Facility: CLINIC | Age: 50
End: 2020-07-17

## 2020-09-03 ENCOUNTER — APPOINTMENT (OUTPATIENT)
Dept: OBGYN | Facility: CLINIC | Age: 50
End: 2020-09-03

## 2020-10-19 ENCOUNTER — RX RENEWAL (OUTPATIENT)
Age: 50
End: 2020-10-19

## 2020-12-21 PROBLEM — Z01.419 ENCOUNTER FOR ANNUAL ROUTINE GYNECOLOGICAL EXAMINATION: Status: RESOLVED | Noted: 2019-08-07 | Resolved: 2020-12-21

## 2021-03-16 ENCOUNTER — APPOINTMENT (OUTPATIENT)
Dept: FAMILY MEDICINE | Facility: CLINIC | Age: 51
End: 2021-03-16

## 2021-04-12 ENCOUNTER — APPOINTMENT (OUTPATIENT)
Dept: FAMILY MEDICINE | Facility: CLINIC | Age: 51
End: 2021-04-12
Payer: MEDICAID

## 2021-04-12 ENCOUNTER — NON-APPOINTMENT (OUTPATIENT)
Age: 51
End: 2021-04-12

## 2021-04-12 VITALS
TEMPERATURE: 97.2 F | WEIGHT: 207 LBS | OXYGEN SATURATION: 94 % | HEART RATE: 33 BPM | BODY MASS INDEX: 35.34 KG/M2 | HEIGHT: 64 IN

## 2021-04-12 VITALS
SYSTOLIC BLOOD PRESSURE: 120 MMHG | HEART RATE: 80 BPM | OXYGEN SATURATION: 97 % | DIASTOLIC BLOOD PRESSURE: 78 MMHG | RESPIRATION RATE: 12 BRPM

## 2021-04-12 DIAGNOSIS — Z91.89 OTHER SPECIFIED PERSONAL RISK FACTORS, NOT ELSEWHERE CLASSIFIED: ICD-10-CM

## 2021-04-12 DIAGNOSIS — Z12.39 ENCOUNTER FOR OTHER SCREENING FOR MALIGNANT NEOPLASM OF BREAST: ICD-10-CM

## 2021-04-12 DIAGNOSIS — Z87.09 PERSONAL HISTORY OF OTHER DISEASES OF THE RESPIRATORY SYSTEM: ICD-10-CM

## 2021-04-12 DIAGNOSIS — Z86.018 PERSONAL HISTORY OF OTHER BENIGN NEOPLASM: ICD-10-CM

## 2021-04-12 DIAGNOSIS — Z87.891 PERSONAL HISTORY OF NICOTINE DEPENDENCE: ICD-10-CM

## 2021-04-12 DIAGNOSIS — Z87.42 PERSONAL HISTORY OF OTHER DISEASES OF THE FEMALE GENITAL TRACT: ICD-10-CM

## 2021-04-12 DIAGNOSIS — Z78.9 OTHER SPECIFIED HEALTH STATUS: ICD-10-CM

## 2021-04-12 DIAGNOSIS — Z71.6 TOBACCO ABUSE COUNSELING: ICD-10-CM

## 2021-04-12 DIAGNOSIS — J34.0 ABSCESS, FURUNCLE AND CARBUNCLE OF NOSE: ICD-10-CM

## 2021-04-12 DIAGNOSIS — J34.89 OTHER SPECIFIED DISORDERS OF NOSE AND NASAL SINUSES: ICD-10-CM

## 2021-04-12 DIAGNOSIS — F14.90 COCAINE USE, UNSPECIFIED, UNCOMPLICATED: ICD-10-CM

## 2021-04-12 DIAGNOSIS — Z01.00 ENCOUNTER FOR EXAMINATION OF EYES AND VISION W/OUT ABNORMAL FINDINGS: ICD-10-CM

## 2021-04-12 DIAGNOSIS — Z13.6 ENCOUNTER FOR SCREENING FOR CARDIOVASCULAR DISORDERS: ICD-10-CM

## 2021-04-12 DIAGNOSIS — R92.2 INCONCLUSIVE MAMMOGRAM: ICD-10-CM

## 2021-04-12 DIAGNOSIS — F17.200 NICOTINE DEPENDENCE, UNSPECIFIED, UNCOMPLICATED: ICD-10-CM

## 2021-04-12 DIAGNOSIS — B35.1 TINEA UNGUIUM: ICD-10-CM

## 2021-04-12 DIAGNOSIS — Z56.0 UNEMPLOYMENT, UNSPECIFIED: ICD-10-CM

## 2021-04-12 DIAGNOSIS — J45.909 UNSPECIFIED ASTHMA, UNCOMPLICATED: ICD-10-CM

## 2021-04-12 DIAGNOSIS — Z86.59 PERSONAL HISTORY OF OTHER MENTAL AND BEHAVIORAL DISORDERS: ICD-10-CM

## 2021-04-12 PROCEDURE — 99396 PREV VISIT EST AGE 40-64: CPT | Mod: 25

## 2021-04-12 PROCEDURE — 93000 ELECTROCARDIOGRAM COMPLETE: CPT

## 2021-04-12 PROCEDURE — 99072 ADDL SUPL MATRL&STAF TM PHE: CPT

## 2021-04-12 RX ORDER — ALBUTEROL SULFATE 2.5 MG/3ML
(2.5 MG/3ML) SOLUTION RESPIRATORY (INHALATION)
Qty: 120 | Refills: 4 | Status: COMPLETED | COMMUNITY
Start: 2020-02-24 | End: 2021-04-12

## 2021-04-12 RX ORDER — ALBUTEROL SULFATE 90 UG/1
108 (90 BASE) AEROSOL, METERED RESPIRATORY (INHALATION)
Qty: 1 | Refills: 0 | Status: ACTIVE | COMMUNITY
Start: 2021-04-12 | End: 1900-01-01

## 2021-04-12 RX ORDER — TRAZODONE HYDROCHLORIDE 300 MG/1
TABLET ORAL
Refills: 0 | Status: ACTIVE | COMMUNITY

## 2021-04-12 RX ORDER — ITRACONAZOLE 100 MG/1
100 CAPSULE, COATED PELLETS ORAL TWICE DAILY
Qty: 1 | Refills: 2 | Status: COMPLETED | COMMUNITY
Start: 2020-03-04 | End: 2021-04-12

## 2021-04-12 RX ORDER — NAPROXEN 500 MG/1
500 TABLET ORAL
Qty: 20 | Refills: 1 | Status: COMPLETED | COMMUNITY
Start: 2019-09-03 | End: 2021-04-12

## 2021-04-12 RX ORDER — DEXTROAMPHETAMINE SACCHARATE, AMPHETAMINE ASPARTATE, DEXTROAMPHETAMINE SULFATE, AND AMPHETAMINE SULFATE 3.75; 3.75; 3.75; 3.75 MG/1; MG/1; MG/1; MG/1
TABLET ORAL
Refills: 0 | Status: ACTIVE | COMMUNITY

## 2021-04-12 RX ORDER — VARENICLINE TARTRATE 1 MG/1
1 TABLET, FILM COATED ORAL
Qty: 56 | Refills: 0 | Status: COMPLETED | COMMUNITY
Start: 2020-02-24 | End: 2021-04-12

## 2021-04-12 RX ORDER — NORETHINDRONE ACETATE 5 MG/1
5 TABLET ORAL
Qty: 12 | Refills: 0 | Status: COMPLETED | COMMUNITY
Start: 2019-09-03 | End: 2021-04-12

## 2021-04-12 SDOH — ECONOMIC STABILITY - INCOME SECURITY: UNEMPLOYMENT, UNSPECIFIED: Z56.0

## 2021-04-12 NOTE — COUNSELING
[Fall prevention counseling provided] : Fall prevention counseling provided [Behavioral health counseling provided] : Behavioral health counseling provided [Sleep ___ hours/day] : Sleep [unfilled] hours/day [Engage in a relaxing activity] : Engage in a relaxing activity [Plan in advance] : Plan in advance [de-identified] : Maintain balanced diet of whole grain, fruits and vegetables, lean meats, skinless poultry, fish, beans, soy products, eggs, nuts, and low fat dairy as per FDA dietary guidelines. \par Avoid high calorie/low nutrient foods. \par vegetables/fruits- at least 5 servings/day, \par whole grains- 100% whole grain and at least 3 grams fiber/day.\par reduce/eliminate saturated fat- less than 5% on nutrition labels (ex. mederos, deli meat, hot dogs, fried foods, cookies, ice cream, chips, soft drinks, etc.)\par stay within your FDA recommended daily calorie needs or use the plate method to portion intake. \par Avoid fast food and limit eating out. When eating out choose healthy alternatives (ex. grilled, baked, steamed. low fat dressings. avoid french fries).\par DO NOT CONSUME FOODS YOU ARE ALLERGIC TO DESPITE DIETARY RECOMMENDATIONS.\par \par For more information you can visit www.Health.gov \par \par Incorporate regular physical activity most days of the week. \par Regular aerobic activity- moderate intensity, most days/wk, at least 30min/day- ex. brisk walk 2.5miles/hr, ballroom dancing, water aerobics, light yard work (raking/lawn mowing) actively playing basketball, biking 10miles/hr.\par Muscle strengthening and strength/resistance exercises 2-3days/wk- ex. free weights, resistance bands, your own weight (squats/pull-ups/push-ups).\par Neuro-motor exercises for balance/agility/coordination and flexibility exercises 2-3days/wk, 20-30min/day- ex. yoga and josesito-chi.\par Bone strengthening at least 30min/day, most days of the week- ex. weights, resistance bands, running, brisk walking, jumping rope, stair climbing, tennis.\par Decrease sedentary time. \par LISTEN TO YOUR BODY AND MODIFY ACTIVITY AS NEEDED- DO NOT OVEREXERT YOURSELF DURING PHYSICAL ACTIVITY DESPITE RECOMMENDATION.\par \par For more information you can visit www.Health.gov  [None] : None [Good understanding] : Patient has a good understanding of lifestyle changes and steps needed to achieve self management goal

## 2021-04-12 NOTE — HISTORY OF PRESENT ILLNESS
[FreeTextEntry1] : pt. here for cpe. [de-identified] : This is a 49y/o female here today for comprehensive physical, patient of Dr. Maya Dow.\par p/w c/o was recently seen in  for chronic sinusitis and nasal septum pain, patient was found to have perforated septum, was dx as infection and started on Levaquin and prednisone, which patient reports having completed therapy yesterday and c/w pain today. patient was referred to ENT but denies having yet followed up. will  evaluate and manage as appropriate. \par PMH reviewed and updated in chart.\par PSH reviewed and updated in chart. \par family history; reviewed- no changes. \par discussed plans for future vaccination Jeferson&Jeferson immunization planned- recommended conservative management for common s/e fatigue, low grade fever, chills, and notify PCP, and seek immediate emergency medication attention for more serious reactions including and not limited to anaphylaxis/allergic response, etc., then notify PCP. \par o/w no major concerns and o/w reports feeling well today; denies other major accidents, injuries, illnesses, hospitalizations.

## 2021-04-12 NOTE — PAST MEDICAL HISTORY
[Surgical Menopause] : in surgical menopause [Total Preg ___] : G[unfilled] [FreeTextEntry1] : no longer menstruating

## 2021-04-12 NOTE — ASSESSMENT
[FreeTextEntry1] : partial perforated septum with infection; Augmentin, saline NS for conservative therapy, f/u with ENT.\par \par BMI; dietitian and life style modification and f/u.\par \par health maintenance; BW and referral for screenings, f/u results. \par \par smoking cessation; Nicorette as directed and f/u.\par \par asthma; pulmonology for PFTs, provided refill of inhaler.\par \par toe nail abnormalities; podiatry for further evaluation/mgmt as appropriate. \par \par

## 2021-04-12 NOTE — PLAN
[FreeTextEntry1] : labs checked today- f/u results.\par ECG result- NSR and denies cardiac concerns. \par f/u with pulmonology, ophthalmology, podiatry, GYN, ENT, dietitian.\par complete DEXA and mammo as discussed.\par mail ifobt- will f/u. \par patient to have all reports sent to our office for review and record keeping.\par medications renewed as requested.\par notify office if worsening/persistent symptoms or new onset complaints/concerns, in the event of any emergency or life threatening condition, go to the nearest emergency department and then notify office. \par patient verbalized understanding and agrees with plan of care. \par refuses flu vaccine.

## 2021-04-12 NOTE — PHYSICAL EXAM
[No Acute Distress] : no acute distress [Well Nourished] : well nourished [Well Developed] : well developed [Normal Sclera/Conjunctiva] : normal sclera/conjunctiva [PERRL] : pupils equal round and reactive to light [Normal Oropharynx] : the oropharynx was normal [Normal TMs] : both tympanic membranes were normal [No Respiratory Distress] : no respiratory distress  [No Accessory Muscle Use] : no accessory muscle use [Clear to Auscultation] : lungs were clear to auscultation bilaterally [Declined Breast Exam] : declined breast exam  [Declined Rectal Exam] : declined rectal exam [Normal Posterior Cervical Nodes] : no posterior cervical lymphadenopathy [Normal Anterior Cervical Nodes] : no anterior cervical lymphadenopathy [Normal] : no joint swelling and grossly normal strength and tone [No Rash] : no rash [Coordination Grossly Intact] : coordination grossly intact [Normal Gait] : normal gait [Normal Affect] : the affect was normal [Normal Insight/Judgement] : insight and judgment were intact [de-identified] : inflamed nasal mucosa, visible partial perforation of septum appears inflamed with pus, o/w no drainage. [de-identified] : GYN [de-identified] : central obesity limits PE [FreeTextEntry1] : IFOBT

## 2021-04-12 NOTE — HEALTH RISK ASSESSMENT
[Good] : ~his/her~  mood as  good [Yes] : Yes [Monthly or less (1 pt)] : Monthly or less (1 point) [1 or 2 (0 pts)] : 1 or 2 (0 points) [Never (0 pts)] : Never (0 points) [No] : In the past 12 months have you used drugs other than those required for medical reasons? No [No falls in past year] : Patient reported no falls in the past year [0] : 2) Feeling down, depressed, or hopeless: Not at all (0) [Patient reported mammogram was normal] : Patient reported mammogram was normal [Patient reported PAP Smear was abnormal] : Patient reported PAP Smear was abnormal [Patient reported bone density results were normal] : Patient reported bone density results were normal [Patient declined colonoscopy] : Patient declined colonoscopy [HIV test declined] : HIV test declined [Hepatitis C test declined] : Hepatitis C test declined [None] : None [With Family] : lives with family [Unemployed] : unemployed [High School] : high school [] :  [# Of Children ___] : has [unfilled] children [Sexually Active] : sexually active [Feels Safe at Home] : Feels safe at home [Fully functional (bathing, dressing, toileting, transferring, walking, feeding)] : Fully functional (bathing, dressing, toileting, transferring, walking, feeding) [Fully functional (using the telephone, shopping, preparing meals, housekeeping, doing laundry, using] : Fully functional and needs no help or supervision to perform IADLs (using the telephone, shopping, preparing meals, housekeeping, doing laundry, using transportation, managing medications and managing finances) [Reports normal functional visual acuity (ie: able to read med bottle)] : Reports normal functional visual acuity [Smoke Detector] : smoke detector [Carbon Monoxide Detector] : carbon monoxide detector [Safety elements used in home] : safety elements used in home [Seat Belt] :  uses seat belt [Sunscreen] : uses sunscreen [Patient/Caregiver not ready to engage] : Patient/Caregiver not ready to engage [] : No [de-identified] : quit 1 week ago. counseled on smoking cessaiton and recommended Nicorette for now.  [Audit-CScore] : 1 [de-identified] : trying to remain active, joined gym yesterday  [de-identified] : well balanced, limited junk and fast food. requesting dietitian.  [[id number]] : 0 [EyeExamDate] : referral  [Change in mental status noted] : No change in mental status noted [Language] : denies difficulty with language [High Risk Behavior] : no high risk behavior [Reports changes in hearing] : Reports no changes in hearing [Reports changes in vision] : Reports no changes in vision [Reports changes in dental health] : Reports no changes in dental health [Guns at Home] : no guns at home [Travel to Developing Areas] : does not  travel to developing areas [TB Exposure] : is not being exposed to tuberculosis [Caregiver Concerns] : does not have caregiver concerns [MammogramDate] : many years ago [MammogramComments] : referral for recheck.  [PapSmearDate] : partial hysterectomy. [PapSmearComments] : making apt. with GYN for women's well check. [BoneDensityDate] : many years ago [BoneDensityComments] : referral to recheck.

## 2021-04-12 NOTE — REVIEW OF SYSTEMS
[Negative] : Heme/Lymph [Earache] : no earache [Hearing Loss] : no hearing loss [Nosebleed] : no nosebleeds [Hoarseness] : no hoarseness [Nasal Discharge] : no nasal discharge [Sore Throat] : no sore throat [Postnasal Drip] : no postnasal drip [FreeTextEntry4] : pain over nasal septum, sinus pressure manages conservatively.  [de-identified] : HAs at times a/w sinuses

## 2021-04-13 LAB
25(OH)D3 SERPL-MCNC: 10.8 NG/ML
ALBUMIN SERPL ELPH-MCNC: 4.6 G/DL
ALP BLD-CCNC: 70 U/L
ALT SERPL-CCNC: 18 U/L
ANION GAP SERPL CALC-SCNC: 13 MMOL/L
AST SERPL-CCNC: 12 U/L
BASOPHILS # BLD AUTO: 0.09 K/UL
BASOPHILS NFR BLD AUTO: 0.7 %
BILIRUB SERPL-MCNC: <0.2 MG/DL
BUN SERPL-MCNC: 24 MG/DL
CALCIUM SERPL-MCNC: 9.7 MG/DL
CHLORIDE SERPL-SCNC: 105 MMOL/L
CHOLEST SERPL-MCNC: 196 MG/DL
CO2 SERPL-SCNC: 23 MMOL/L
CREAT SERPL-MCNC: 1.2 MG/DL
EOSINOPHIL # BLD AUTO: 0.44 K/UL
EOSINOPHIL NFR BLD AUTO: 3.5 %
ESTIMATED AVERAGE GLUCOSE: 120 MG/DL
FERRITIN SERPL-MCNC: 161 NG/ML
FOLATE SERPL-MCNC: 9.6 NG/ML
GLUCOSE SERPL-MCNC: 111 MG/DL
HBA1C MFR BLD HPLC: 5.8 %
HCT VFR BLD CALC: 44.4 %
HDLC SERPL-MCNC: 51 MG/DL
HGB BLD-MCNC: 14.2 G/DL
IMM GRANULOCYTES NFR BLD AUTO: 1.4 %
IRON SATN MFR SERPL: 37 %
IRON SERPL-MCNC: 126 UG/DL
LDLC SERPL CALC-MCNC: NORMAL MG/DL
LYMPHOCYTES # BLD AUTO: 2.83 K/UL
LYMPHOCYTES NFR BLD AUTO: 22.5 %
MAN DIFF?: NORMAL
MCHC RBC-ENTMCNC: 30.1 PG
MCHC RBC-ENTMCNC: 32 GM/DL
MCV RBC AUTO: 94.1 FL
MONOCYTES # BLD AUTO: 1.21 K/UL
MONOCYTES NFR BLD AUTO: 9.6 %
NEUTROPHILS # BLD AUTO: 7.85 K/UL
NEUTROPHILS NFR BLD AUTO: 62.3 %
NONHDLC SERPL-MCNC: 145 MG/DL
PLATELET # BLD AUTO: 386 K/UL
POTASSIUM SERPL-SCNC: 4.9 MMOL/L
PROT SERPL-MCNC: 6.9 G/DL
RBC # BLD: 4.72 M/UL
RBC # FLD: 13.9 %
SODIUM SERPL-SCNC: 140 MMOL/L
T4 FREE SERPL-MCNC: 1.1 NG/DL
TIBC SERPL-MCNC: 344 UG/DL
TRIGL SERPL-MCNC: 623 MG/DL
TSH SERPL-ACNC: 0.79 UIU/ML
UIBC SERPL-MCNC: 218 UG/DL
VIT B12 SERPL-MCNC: 667 PG/ML
WBC # FLD AUTO: 12.59 K/UL

## 2021-04-13 RX ORDER — HYDROCORTISONE 25 MG/G
2.5 OINTMENT TOPICAL
Qty: 20 | Refills: 0 | Status: COMPLETED | COMMUNITY
Start: 2021-02-23

## 2021-04-13 RX ORDER — MUPIROCIN 20 MG/G
2 OINTMENT TOPICAL
Qty: 22 | Refills: 0 | Status: COMPLETED | COMMUNITY
Start: 2021-03-06

## 2021-04-13 RX ORDER — ERGOCALCIFEROL 1.25 MG/1
1.25 MG CAPSULE ORAL
Qty: 4 | Refills: 1 | Status: ACTIVE | COMMUNITY
Start: 2021-04-13 | End: 1900-01-01

## 2021-04-13 RX ORDER — DEXTROAMPHETAMINE SACCHARATE, AMPHETAMINE ASPARTATE, DEXTROAMPHETAMINE SULFATE AND AMPHETAMINE SULFATE 7.5; 7.5; 7.5; 7.5 MG/1; MG/1; MG/1; MG/1
30 TABLET ORAL
Qty: 60 | Refills: 0 | Status: COMPLETED | COMMUNITY
Start: 2020-12-31

## 2021-04-13 RX ORDER — KETOCONAZOLE 20 MG/G
2 CREAM TOPICAL
Qty: 60 | Refills: 0 | Status: COMPLETED | COMMUNITY
Start: 2021-02-23

## 2021-04-13 RX ORDER — PREDNISONE 20 MG/1
20 TABLET ORAL
Qty: 10 | Refills: 0 | Status: COMPLETED | COMMUNITY
Start: 2021-01-10

## 2021-04-13 RX ORDER — BETAMETHASONE DIPROPIONATE 0.5 MG/G
0.05 OINTMENT TOPICAL
Qty: 90 | Refills: 0 | Status: COMPLETED | COMMUNITY
Start: 2021-03-23

## 2021-04-13 RX ORDER — FLUTICASONE PROPIONATE 50 UG/1
50 SPRAY, METERED NASAL
Qty: 16 | Refills: 0 | Status: COMPLETED | COMMUNITY
Start: 2021-03-23

## 2021-04-13 RX ORDER — AMMONIUM LACTATE 12 %
12 CREAM (GRAM) TOPICAL
Qty: 385 | Refills: 0 | Status: COMPLETED | COMMUNITY
Start: 2021-02-23

## 2021-04-13 RX ORDER — LEVOFLOXACIN 500 MG/1
500 TABLET, FILM COATED ORAL
Qty: 10 | Refills: 0 | Status: COMPLETED | COMMUNITY
Start: 2021-04-02

## 2021-04-15 ENCOUNTER — APPOINTMENT (OUTPATIENT)
Dept: FAMILY MEDICINE | Facility: CLINIC | Age: 51
End: 2021-04-15

## 2021-04-19 ENCOUNTER — APPOINTMENT (OUTPATIENT)
Dept: FAMILY MEDICINE | Facility: CLINIC | Age: 51
End: 2021-04-19

## 2021-04-19 LAB — HEMOCCULT STL QL IA: NEGATIVE

## 2021-04-20 ENCOUNTER — APPOINTMENT (OUTPATIENT)
Dept: FAMILY MEDICINE | Facility: CLINIC | Age: 51
End: 2021-04-20

## 2021-04-23 ENCOUNTER — APPOINTMENT (OUTPATIENT)
Dept: BARIATRICS | Facility: CLINIC | Age: 51
End: 2021-04-23

## 2021-04-26 ENCOUNTER — APPOINTMENT (OUTPATIENT)
Dept: OTOLARYNGOLOGY | Facility: CLINIC | Age: 51
End: 2021-04-26

## 2021-05-13 ENCOUNTER — RX RENEWAL (OUTPATIENT)
Age: 51
End: 2021-05-13

## 2021-05-17 ENCOUNTER — RX RENEWAL (OUTPATIENT)
Age: 51
End: 2021-05-17

## 2021-05-30 ENCOUNTER — EMERGENCY (EMERGENCY)
Facility: HOSPITAL | Age: 51
LOS: 1 days | Discharge: HOME | End: 2021-05-30
Attending: STUDENT IN AN ORGANIZED HEALTH CARE EDUCATION/TRAINING PROGRAM
Payer: MEDICAID

## 2021-05-30 VITALS
HEIGHT: 63 IN | DIASTOLIC BLOOD PRESSURE: 79 MMHG | SYSTOLIC BLOOD PRESSURE: 135 MMHG | TEMPERATURE: 98 F | WEIGHT: 199.96 LBS | RESPIRATION RATE: 20 BRPM | HEART RATE: 90 BPM | OXYGEN SATURATION: 96 %

## 2021-05-30 DIAGNOSIS — Z98.890 OTHER SPECIFIED POSTPROCEDURAL STATES: Chronic | ICD-10-CM

## 2021-05-30 DIAGNOSIS — Z98.89 OTHER SPECIFIED POSTPROCEDURAL STATES: Chronic | ICD-10-CM

## 2021-05-30 DIAGNOSIS — Z98.51 TUBAL LIGATION STATUS: Chronic | ICD-10-CM

## 2021-05-30 PROCEDURE — 99283 EMERGENCY DEPT VISIT LOW MDM: CPT

## 2021-05-30 PROCEDURE — 99284 EMERGENCY DEPT VISIT MOD MDM: CPT

## 2021-05-30 RX ORDER — IBUPROFEN 200 MG
600 TABLET ORAL ONCE
Refills: 0 | Status: COMPLETED | OUTPATIENT
Start: 2021-05-30 | End: 2021-05-30

## 2021-05-30 RX ADMIN — Medication 50 MILLIGRAM(S): at 07:43

## 2021-05-30 RX ADMIN — Medication 1 TABLET(S): at 07:44

## 2021-05-30 RX ADMIN — Medication 600 MILLIGRAM(S): at 07:44

## 2021-05-30 NOTE — ED PROVIDER NOTE - PHYSICAL EXAMINATION
Constitutional: Awake, alert, in no acute distress, speaking in full sentences  Eyes: no scleral icterus  HENT: normocephalic, atraumatic, moist oral mucosa, +b/l nasal turbinate inflammatory changes with no doe purulent discharge noted, +b/l frontal and maxillary sinus tenderness, oropharynx clear, TMs nonerythematous  Neck: supple  CV: RRR, no murmur  Pulm: non-labored respirations, CTAB  Abdomen: soft, non-tender, non-distended  Extremities: no edema, no deformity  Skin: no rash, no jaundice  Neuro: AAOx3, moving all extremities equally Constitutional: Awake, alert, in no acute distress, speaking in full sentences  Eyes: no scleral icterus  HENT: normocephalic, atraumatic, moist oral mucosa, +b/l nasal turbinate inflammatory changes with mild purulent discharge noted, +b/l frontal and maxillary sinus tenderness, oropharynx clear, TMs nonerythematous  Neck: supple  CV: RRR, no murmur  Pulm: non-labored respirations, CTAB  Abdomen: soft, non-tender, non-distended  Extremities: no edema, no deformity  Skin: no rash, no jaundice  Neuro: AAOx3, moving all extremities equally

## 2021-05-30 NOTE — ED PROVIDER NOTE - PATIENT PORTAL LINK FT
You can access the FollowMyHealth Patient Portal offered by [facility] by registering at the following website: [email or web address]. By joining [employer]’s FollowMyHealth portal, you will also be able to view your health information using other applications (apps) compatible with our system.

## 2021-05-30 NOTE — ED PROVIDER NOTE - OBJECTIVE STATEMENT
50y F w/ hx nasal septum perforation (2/2 prior drug use), asthma, ADHD, presenting for sinus pain/congestion.  Pt reports onset of symptoms 4 days ago, and says she has been unable to sleep the past 2 nights due to facial pain.  Has been taking tylenol and motrin for pain.  Denies fever, chills, cough.  States that she has history of recurrent sinus infections, which typically respond to prednisone and augmentin.  Says that she has been told she cannot use nasal sprays due to perforated nasal septum.  Has appointment with ENT in 2 weeks.

## 2021-05-30 NOTE — ED PROVIDER NOTE - NS ED ROS FT
Constitutional: no fever, no chills  Eyes: no vision changes  ENT: +nasal congestion, no sore throat  CV: no chest pain  Resp: no cough, no shortness of breath  GI: no abdominal pain, no vomiting, no diarrhea  : no dysuria  MSK: no joint pain  Skin: no rash  Neuro: no headache, no weakness, no paresthesias

## 2021-05-30 NOTE — ED PROVIDER NOTE - ATTENDING CONTRIBUTION TO CARE
I performed a face to face history and physical exam of the patient and discussed their management with the student/resident/ACP. I reviewed the student/resident/ACP's note and agree with the documented findings and plan of care.    Pt states that she has had 4 d of sinus pain and congestion.  no relief with tylenol and motrin. no fever/chills. no other complaints.    physical - rrr. ctab. abd - soft, nt. no edema. no rash. nose with B/L hyperemia of mucous membranes with small yellow drainage.    plan - will start steroids and augmentin. Pt reassured. will d/c with outpatient ent f/up. given return instructions.

## 2021-05-30 NOTE — ED PROVIDER NOTE - NSFOLLOWUPINSTRUCTIONS_ED_ALL_ED_FT
- Follow-up with your ENT doctor as previously scheduled.  - Take medications as prescribed  - Return to the emergency room for new or worsening symptoms, including high fever, pus draining from the nose.    -----------------------------------------    Rhinosinusitis    WHAT YOU NEED TO KNOW:    Rhinosinusitis (RS) is inflammation of your nose and sinuses. It commonly begins as a virus, often as a common cold. Viruses usually last 7 to 10 days and do not need treatment. When the virus does not get better on its own, you may have bacterial RS. This means that bacteria have begun to grow inside your sinuses. Acute RS lasts less than 4 weeks. Chronic RS lasts 12 weeks or more. Recurrent RS is when you have 4 or more episodes of RS in one year.     Sinuses       DISCHARGE INSTRUCTIONS:    Return to the emergency department if:   •Your eye and eyelid are red, swollen, and painful.       •You cannot open your eye.       •You have double vision or you cannot see.       •Your eyeball bulges out or you cannot move your eye.       •You are more sleepy than normal or you notice changes in your ability to think, move, or talk.       •You have a stiff neck, a fever, or a bad headache.       •You have swelling of your forehead or scalp.      Contact your healthcare provider if:   •Your symptoms are worse or do not improve after 3 to 5 days of treatment.       •You have questions or concerns about your condition or care.      Medicines: You may need any of the following:   •Acetaminophen decreases pain and fever. It is available without a doctor's order. Ask how much to take and how often to take it. Follow directions. Acetaminophen can cause liver damage if not taken correctly.      •NSAIDs, such as ibuprofen, help decrease swelling, pain, and fever. This medicine is available with or without a doctor's order. NSAIDs can cause stomach bleeding or kidney problems in certain people. If you take blood thinner medicine, always ask your healthcare provider if NSAIDs are safe for you. Always read the medicine label and follow directions.      •Nasal steroid sprays decrease inflammation in your nose and sinuses.      •Decongestants reduce swelling and drain mucus in the nose and sinuses. They may help you breathe easier.       •Antihistamines dry mucus in the nose and relieve sneezing.       •Antibiotics treat a bacterial infection and may be needed if your symptoms do not improve or they get worse.       •Take your medicine as directed. Contact your healthcare provider if you think your medicine is not helping or if you have side effects. Tell him or her if you are allergic to any medicine. Keep a list of the medicines, vitamins, and herbs you take. Include the amounts, and when and why you take them. Bring the list or the pill bottles to follow-up visits. Carry your medicine list with you in case of an emergency.      Self-care:   •Rinse your sinuses. Use a sinus rinse device to rinse your nasal passages with a saline (salt water) solution. This will help thin the mucus in your nose and rinse away pollen and dirt. It will also help reduce swelling so you can breathe normally. Ask your healthcare provider how often to do this.       •Breathe in steam. Heat a bowl of water until you see steam. Lean over the bowl and make a tent over your head with a large towel. Breathe deeply for about 20 minutes. Be careful not to get too close to the steam or burn yourself. Do this 3 times a day. You can also breathe deeply when you take a hot shower.       •Sleep with your head elevated. Place an extra pillow under your head before you go to sleep to help your sinuses drain.       •Drink liquids as directed. Ask your healthcare provider how much liquid to drink each day and which liquids are best for you. Liquids will thin the mucus in your nose and help it drain. Avoid drinks that contain alcohol or caffeine.       •Do not smoke, and avoid secondhand smoke. Nicotine and other chemicals in cigarettes and cigars can make your symptoms worse. Ask your healthcare provider for information if you currently smoke and need help to quit. E-cigarettes or smokeless tobacco still contain nicotine. Talk to your healthcare provider before you use these products.       Follow up with your healthcare provider as directed: Follow up if your symptoms are worse or not better after 3 to 5 days of treatment. Write down your questions so you remember to ask them during your visits.

## 2021-05-30 NOTE — ED ADULT TRIAGE NOTE - NSSEPSISSUSPECTED_ED_A_ED
Implemented All Fall Risk Interventions:  Max to call system. Call bell, personal items and telephone within reach. Instruct patient to call for assistance. Room bathroom lighting operational. Non-slip footwear when patient is off stretcher. Physically safe environment: no spills, clutter or unnecessary equipment. Stretcher in lowest position, wheels locked, appropriate side rails in place. Provide visual cue, wrist band, yellow gown, etc. Monitor gait and stability. Monitor for mental status changes and reorient to person, place, and time. Review medications for side effects contributing to fall risk. Reinforce activity limits and safety measures with patient and family. No

## 2021-05-30 NOTE — ED ADULT TRIAGE NOTE - CHIEF COMPLAINT QUOTE
patient states that she has HX of perforated septum, complaining of sinus pain facial swelling  and jaw pain for a couple days states can not sleep due to being uncomfortable, states inside nose all white

## 2021-05-30 NOTE — ED PROVIDER NOTE - CLINICAL SUMMARY MEDICAL DECISION MAKING FREE TEXT BOX
50y F w/ hx perforated nasal septum, asthma, presenting for 4 days of sinusitis symptoms.  Will treat with prednisone, augmentin, motrin.  Pt to f/u with ENT in 2 weeks as previously scheduled. 50y F w/ hx perforated nasal septum, asthma, presenting for 4 days of sinusitis symptoms.  Pt appears nontoxic, with stable VS.  Will treat with prednisone, augmentin, motrin.  Pt to f/u with ENT in 2 weeks as previously scheduled. 50y F w/ hx perforated nasal septum, asthma, presenting for 4 days of sinusitis symptoms.  Pt appears nontoxic with stable VS.  Will treat with prednisone, augmentin.  Pt requesting pain medication; advised that she may continue with tylenol and motrin.  Pt to f/u with ENT in 2 weeks as previously scheduled.

## 2021-07-04 ENCOUNTER — RX RENEWAL (OUTPATIENT)
Age: 51
End: 2021-07-04

## 2021-07-06 ENCOUNTER — RX RENEWAL (OUTPATIENT)
Age: 51
End: 2021-07-06

## 2021-07-26 PROBLEM — J45.909 UNSPECIFIED ASTHMA, UNCOMPLICATED: Chronic | Status: ACTIVE | Noted: 2021-05-30

## 2021-07-26 PROBLEM — F90.9 ATTENTION-DEFICIT HYPERACTIVITY DISORDER, UNSPECIFIED TYPE: Chronic | Status: ACTIVE | Noted: 2021-05-30

## 2021-07-26 PROBLEM — J34.89 OTHER SPECIFIED DISORDERS OF NOSE AND NASAL SINUSES: Chronic | Status: ACTIVE | Noted: 2021-05-30

## 2021-07-27 ENCOUNTER — APPOINTMENT (OUTPATIENT)
Dept: FAMILY MEDICINE | Facility: CLINIC | Age: 51
End: 2021-07-27

## 2021-07-29 ENCOUNTER — RX RENEWAL (OUTPATIENT)
Age: 51
End: 2021-07-29

## 2021-08-26 NOTE — ED PROVIDER NOTE - CPE EDP SKIN NORM
normal... [No Acute Distress] : no acute distress [Well Nourished] : well nourished [Well Developed] : well developed [Well-Appearing] : well-appearing [Normal Sclera/Conjunctiva] : normal sclera/conjunctiva [PERRL] : pupils equal round and reactive to light [EOMI] : extraocular movements intact [Normal Outer Ear/Nose] : the outer ears and nose were normal in appearance [Normal Oropharynx] : the oropharynx was normal [No JVD] : no jugular venous distention [No Lymphadenopathy] : no lymphadenopathy [Supple] : supple [Thyroid Normal, No Nodules] : the thyroid was normal and there were no nodules present [No Respiratory Distress] : no respiratory distress  [No Accessory Muscle Use] : no accessory muscle use [Clear to Auscultation] : lungs were clear to auscultation bilaterally [Normal Rate] : normal rate  [Regular Rhythm] : with a regular rhythm [Normal S1, S2] : normal S1 and S2 [No Murmur] : no murmur heard [No Carotid Bruits] : no carotid bruits [No Abdominal Bruit] : a ~M bruit was not heard ~T in the abdomen [No Varicosities] : no varicosities [Pedal Pulses Present] : the pedal pulses are present [No Edema] : there was no peripheral edema [No Palpable Aorta] : no palpable aorta [No Extremity Clubbing/Cyanosis] : no extremity clubbing/cyanosis [Soft] : abdomen soft [Non Tender] : non-tender [Non-distended] : non-distended [No Masses] : no abdominal mass palpated [No HSM] : no HSM [Normal Bowel Sounds] : normal bowel sounds [Normal Posterior Cervical Nodes] : no posterior cervical lymphadenopathy [Normal Anterior Cervical Nodes] : no anterior cervical lymphadenopathy [No CVA Tenderness] : no CVA  tenderness [No Spinal Tenderness] : no spinal tenderness [No Joint Swelling] : no joint swelling [Grossly Normal Strength/Tone] : grossly normal strength/tone [No Rash] : no rash [Coordination Grossly Intact] : coordination grossly intact [No Focal Deficits] : no focal deficits [Normal Gait] : normal gait [Deep Tendon Reflexes (DTR)] : deep tendon reflexes were 2+ and symmetric [Normal Affect] : the affect was normal [Normal Insight/Judgement] : insight and judgment were intact

## 2022-09-07 NOTE — ED ADULT NURSE NOTE - BREATH SOUNDS, MLM
[Normal Growth] : growth [Normal Development] : development  [No Elimination Concerns] : elimination [Continue Regimen] : feeding [No Skin Concerns] : skin [Normal Sleep Pattern] : sleep [None] : no medical problems [Anticipatory Guidance Given] : Anticipatory guidance addressed as per the history of present illness section [No Vaccines] : no vaccines needed [No Medications] : ~He/She~ is not on any medications [Patient] : patient [Parent/Guardian] : Parent/Guardian [FreeTextEntry1] : Well child\par IEP- for TINA. Reading help .\par \par No PANDAS sx now - advised if recurrance of tics, OCD< behavior changes RTO for strep test. \par \par Gets Vit C daily, child recommended dose - advised not to overdose. \par Natural drops for focusing and attn - to help kids at onset of school, mom does not think has ADHD. \par \par Mother does not want flu vaccine. \par \par Safe car travel, seat belt. \par Safety helmets when indicated. \par Healthy diet and exercise.  5210 reviewed.\par Tap water for fluoride, teeth brushing. Dentist.\par Limit screen times to 1-2 hours or less a day, encourage reading.\par Encourage reading. \par Smoke detector, carbon monoxide detector.\par \par Advised CO monitor, mom not sure have this. \par Rtn for flu vaccine if changes mind that wants it. Explained vaccine safety, R/B. Vaccines ARE a natural way to protect against diseases that can be severe. Mother says she is aware of the risks of the disease but does not want flu vaccine. \par Also refuses covid vaccines. \par  Clear

## 2022-11-08 ENCOUNTER — APPOINTMENT (OUTPATIENT)
Dept: FAMILY MEDICINE | Facility: CLINIC | Age: 52
End: 2022-11-08

## 2022-11-29 ENCOUNTER — APPOINTMENT (OUTPATIENT)
Dept: FAMILY MEDICINE | Facility: CLINIC | Age: 52
End: 2022-11-29

## 2022-11-29 VITALS
DIASTOLIC BLOOD PRESSURE: 72 MMHG | SYSTOLIC BLOOD PRESSURE: 120 MMHG | TEMPERATURE: 97.5 F | OXYGEN SATURATION: 98 % | WEIGHT: 187 LBS | HEART RATE: 85 BPM | BODY MASS INDEX: 31.92 KG/M2 | HEIGHT: 64 IN | RESPIRATION RATE: 14 BRPM

## 2022-11-29 DIAGNOSIS — Z23 ENCOUNTER FOR IMMUNIZATION: ICD-10-CM

## 2022-11-29 DIAGNOSIS — E78.5 HYPERLIPIDEMIA, UNSPECIFIED: ICD-10-CM

## 2022-11-29 DIAGNOSIS — Z00.00 ENCOUNTER FOR GENERAL ADULT MEDICAL EXAMINATION W/OUT ABNORMAL FINDINGS: ICD-10-CM

## 2022-11-29 PROCEDURE — 90686 IIV4 VACC NO PRSV 0.5 ML IM: CPT

## 2022-11-29 PROCEDURE — G0008: CPT

## 2022-11-29 PROCEDURE — 99396 PREV VISIT EST AGE 40-64: CPT | Mod: 25

## 2022-11-29 PROCEDURE — 99406 BEHAV CHNG SMOKING 3-10 MIN: CPT | Mod: 25

## 2022-11-29 PROCEDURE — G0444 DEPRESSION SCREEN ANNUAL: CPT | Mod: 59

## 2022-11-29 RX ORDER — AMOXICILLIN AND CLAVULANATE POTASSIUM 875; 125 MG/1; MG/1
875-125 TABLET, COATED ORAL
Qty: 20 | Refills: 0 | Status: DISCONTINUED | COMMUNITY
Start: 2021-04-12 | End: 2022-11-29

## 2022-11-29 NOTE — HISTORY OF PRESENT ILLNESS
[FreeTextEntry1] : Patient is here for CPE  [de-identified] : Bria is a 53 yo woman with history of asthma, nicotine dependence, ADHD, depression, who presents today for CPE.\par \par Health  maintenance:\par Last pap:  hysterectomy 2019, states she has not followed up with gynecology since her hysterectomy\par Last mammogram: She cannot recall\par Last colonoscopy: Never\par Influenza vaccine: Received today\par COVID vaccine- has gotten x3 doses\par \par She currently smokes 2 cigarettes/day.  She had previously tried nicotine replacement.  She is planning to continue decreasing smoking.\par \par Asthma-she has not been seen by a pulmonologist.  She currently uses montelukast in addition to nebulized and inhaled albuterol.  She does not have a maintenance inhaler. \par \par History of goiter -Per patient she was evaluated in endocrinology and was told that her thyroid was normal.  She reports swollen lymph nodes that have been tender for the past 2 years.  She denies any fever or chills but she does report night sweats that she is unsure if are attributed to menopause.\par \par

## 2022-11-29 NOTE — ASSESSMENT
[FreeTextEntry1] : Will update routine annual labs.\par Counseled on smoking cessation.\par Vaccine today.\par Screening mammogram: Asked to be placed.\par - Asthma -referral to pulmonology made.  Counseled that if she is using her rescue therapy inhaler more than 3 times weekly sleep, she likely needs a maintenance therapy to\par She will follow-up in 1 month for reevaluation.\par \par

## 2022-11-29 NOTE — HEALTH RISK ASSESSMENT
[Current] : Current [1] : 2) Feeling down, depressed, or hopeless for several days (1) [Several Days (1)] : 2.) Feeling down, depressed or hopeless? Several days [1/2 of Days or More (2)] : 4.) Feeling tired or having little energy? Half the days or more [Nearly Every Day (3)] : 7.) Trouble concentrating on things, such as reading a newspaper or watching television? Nearly every day [Not at All (0)] : 9.) Thoughts that you would be off dead or of hurting yourself in some way? Not at all [Mild] : severity of depression is mild [I have developed a follow-up plan documented below in the note.] : I have developed a follow-up plan documented below in the note. [[id number]] : 2 [[id number]] : 7 [MammogramComments] : ordered today [ColonoscopyDate] : never [ColonoscopyComments] : referral to GI placed today

## 2022-11-30 LAB
25(OH)D3 SERPL-MCNC: 14 NG/ML
ALBUMIN SERPL ELPH-MCNC: 4.9 G/DL
ALP BLD-CCNC: 78 U/L
ALT SERPL-CCNC: 16 U/L
ANION GAP SERPL CALC-SCNC: 12 MMOL/L
AST SERPL-CCNC: 12 U/L
BASOPHILS # BLD AUTO: 0.1 K/UL
BASOPHILS NFR BLD AUTO: 1.3 %
BILIRUB SERPL-MCNC: 0.3 MG/DL
BUN SERPL-MCNC: 14 MG/DL
CALCIUM SERPL-MCNC: 10.2 MG/DL
CHLORIDE SERPL-SCNC: 100 MMOL/L
CHOLEST SERPL-MCNC: 211 MG/DL
CO2 SERPL-SCNC: 26 MMOL/L
CREAT SERPL-MCNC: 0.63 MG/DL
EGFR: 108 ML/MIN/1.73M2
EOSINOPHIL # BLD AUTO: 0.92 K/UL
EOSINOPHIL NFR BLD AUTO: 11.6 %
ESTIMATED AVERAGE GLUCOSE: 114 MG/DL
GLUCOSE SERPL-MCNC: 119 MG/DL
HBA1C MFR BLD HPLC: 5.6 %
HCT VFR BLD CALC: 46.1 %
HCV AB SER QL: NONREACTIVE
HCV S/CO RATIO: 0.12 S/CO
HDLC SERPL-MCNC: 69 MG/DL
HGB BLD-MCNC: 14.2 G/DL
HIV1+2 AB SPEC QL IA.RAPID: NONREACTIVE
IMM GRANULOCYTES NFR BLD AUTO: 0.8 %
LDLC SERPL CALC-MCNC: 107 MG/DL
LYMPHOCYTES # BLD AUTO: 2.32 K/UL
LYMPHOCYTES NFR BLD AUTO: 29.3 %
MAN DIFF?: NORMAL
MCHC RBC-ENTMCNC: 29.6 PG
MCHC RBC-ENTMCNC: 30.8 GM/DL
MCV RBC AUTO: 96 FL
MONOCYTES # BLD AUTO: 0.56 K/UL
MONOCYTES NFR BLD AUTO: 7.1 %
NEUTROPHILS # BLD AUTO: 3.96 K/UL
NEUTROPHILS NFR BLD AUTO: 49.9 %
NONHDLC SERPL-MCNC: 142 MG/DL
PLATELET # BLD AUTO: 392 K/UL
POTASSIUM SERPL-SCNC: 4.5 MMOL/L
PROT SERPL-MCNC: 7.6 G/DL
RBC # BLD: 4.8 M/UL
RBC # FLD: 13.5 %
SODIUM SERPL-SCNC: 138 MMOL/L
T4 FREE SERPL-MCNC: 1.1 NG/DL
TRIGL SERPL-MCNC: 175 MG/DL
TSH SERPL-ACNC: 0.96 UIU/ML
WBC # FLD AUTO: 7.92 K/UL

## 2022-12-28 ENCOUNTER — APPOINTMENT (OUTPATIENT)
Dept: FAMILY MEDICINE | Facility: CLINIC | Age: 52
End: 2022-12-28

## 2023-01-03 ENCOUNTER — RX RENEWAL (OUTPATIENT)
Age: 53
End: 2023-01-03

## 2023-01-25 ENCOUNTER — APPOINTMENT (OUTPATIENT)
Dept: FAMILY MEDICINE | Facility: CLINIC | Age: 53
End: 2023-01-25

## 2023-02-03 ENCOUNTER — RX RENEWAL (OUTPATIENT)
Age: 53
End: 2023-02-03

## 2023-02-07 ENCOUNTER — RX RENEWAL (OUTPATIENT)
Age: 53
End: 2023-02-07

## 2023-02-24 ENCOUNTER — APPOINTMENT (OUTPATIENT)
Dept: FAMILY MEDICINE | Facility: CLINIC | Age: 53
End: 2023-02-24
Payer: MEDICAID

## 2023-02-24 VITALS
WEIGHT: 185 LBS | BODY MASS INDEX: 31.58 KG/M2 | OXYGEN SATURATION: 97 % | HEART RATE: 86 BPM | RESPIRATION RATE: 16 BRPM | HEIGHT: 64 IN | TEMPERATURE: 97.7 F

## 2023-02-24 VITALS — DIASTOLIC BLOOD PRESSURE: 84 MMHG | SYSTOLIC BLOOD PRESSURE: 140 MMHG

## 2023-02-24 DIAGNOSIS — N20.0 CALCULUS OF KIDNEY: ICD-10-CM

## 2023-02-24 DIAGNOSIS — E04.9 NONTOXIC GOITER, UNSPECIFIED: ICD-10-CM

## 2023-02-24 DIAGNOSIS — F17.200 NICOTINE DEPENDENCE, UNSPECIFIED, UNCOMPLICATED: ICD-10-CM

## 2023-02-24 DIAGNOSIS — J45.20 MILD INTERMITTENT ASTHMA, UNCOMPLICATED: ICD-10-CM

## 2023-02-24 DIAGNOSIS — E55.9 VITAMIN D DEFICIENCY, UNSPECIFIED: ICD-10-CM

## 2023-02-24 PROCEDURE — 99214 OFFICE O/P EST MOD 30 MIN: CPT

## 2023-02-24 RX ORDER — MONTELUKAST 10 MG/1
10 TABLET, FILM COATED ORAL
Qty: 90 | Refills: 0 | Status: ACTIVE | COMMUNITY
Start: 2020-02-24 | End: 1900-01-01

## 2023-02-24 RX ORDER — ALBUTEROL SULFATE 2.5 MG/3ML
(2.5 MG/3ML) SOLUTION RESPIRATORY (INHALATION)
Qty: 120 | Refills: 1 | Status: ACTIVE | COMMUNITY
Start: 2020-02-24 | End: 1900-01-01

## 2023-02-24 RX ORDER — PHENYLEPHRINE HCL 10 MG
7 TABLET ORAL DAILY
Qty: 1 | Refills: 1 | Status: ACTIVE | COMMUNITY
Start: 2023-02-24 | End: 1900-01-01

## 2023-02-24 RX ORDER — NICOTINE POLACRILEX 4 MG/1
4 GUM, CHEWING BUCCAL
Qty: 100 | Refills: 2 | Status: DISCONTINUED | COMMUNITY
Start: 2021-06-11 | End: 2023-02-24

## 2023-02-24 RX ORDER — NICOTINE POLACRILEX 4 MG/1
4 GUM, CHEWING ORAL
Qty: 1 | Refills: 0 | Status: DISCONTINUED | COMMUNITY
Start: 2021-04-12 | End: 2023-02-24

## 2023-02-24 RX ORDER — TRAMADOL HYDROCHLORIDE 50 MG/1
50 TABLET, COATED ORAL
Qty: 90 | Refills: 0 | Status: ACTIVE | COMMUNITY
Start: 2023-02-24 | End: 1900-01-01

## 2023-02-24 NOTE — REVIEW OF SYSTEMS
[Hot Flashes] : hot flashes [Back Pain] : back pain [Negative] : Respiratory [Fever] : no fever [Chills] : no chills [Dysuria] : no dysuria [Hematuria] : no hematuria

## 2023-02-24 NOTE — HISTORY OF PRESENT ILLNESS
[FreeTextEntry1] : pt. here for follow up pt. was in hospital for kidney stones [de-identified] : Bria presents today for follow-up.  In the interim, she was seen at [facility] earlier this week for kidney stones.  Discharge papers reviewed which showed that she had CT done which showed a 2 mm nonobstructing stone on the right side.  She was treated with analgesics in the hospital and given 3-day course of Percocet at home.  She states today that she continues to have right-sided back pain, no hematuria, does not think she has passed the stone yet.  She was given referral to urologist but would like a referral to urology within the [facility] system.\par \par For her asthma, she continues to use her rescue inhaler.  She has been sick with some URIs since her last visit so for that reason has been using her inhaler more often but previous to that states that the asthma was well controlled just on the rescue inhaler.\par \par She also notes that her neck continues to bother her. Reviewed normal TFTs from labwork, sometimes notes some tenderness of her neck.\par \par She continues to smoke 1/2 PPW, did not like the nicotine gum. Willing to try nicotine patch.

## 2023-02-24 NOTE — PHYSICAL EXAM
[Normal] : normal rate, regular rhythm, normal S1 and S2 and no murmur heard [de-identified] : enlarged thyroid [de-identified] : R sided CVA tenderness

## 2023-02-24 NOTE — ASSESSMENT
[FreeTextEntry1] : Asthma-controlled just with rescue inhaler.  We will continue to monitor.  She is aware that if she is using the rescue inhaler more than 3 times weekly sleep needing it once her acute illnesses have resolved, she is to notify me.\par \par Vitamin D deficiency-continue supplementation\par \par Kidney stone-Short course of tramadol given for pain.  Referral to urology for further management given\par \par Nicotine dependence counseled on smoking cessation.  Nicotine replacement patch prescribed\par \par Enlarged thyroid-TFTs normal, will check thyroid ultrasound and follow-up results.\par \par RTC in 3 months or sooner as needed.\par \par

## 2023-02-28 NOTE — ED PROVIDER NOTE - NSTIMEPROVIDERCAREINITIATE_GEN_ER
28-Mar-2018 04:15 Mirvaso Pregnancy And Lactation Text: This medication has not been assigned a Pregnancy Risk Category. It is unknown if the medication is excreted in breast milk.

## 2023-04-07 ENCOUNTER — RX RENEWAL (OUTPATIENT)
Age: 53
End: 2023-04-07

## 2023-04-07 ENCOUNTER — NON-APPOINTMENT (OUTPATIENT)
Age: 53
End: 2023-04-07

## 2023-04-24 NOTE — ED ADULT NURSE NOTE - CARDIO ASSESSMENT
Patient with acute kidney injury likely due to IVVD/dehydration LOLY is currently worsening. Labs reviewed- Renal function/electrolytes with Estimated Creatinine Clearance: 15.6 mL/min (A) (based on SCr of 2.5 mg/dL (H)). according to latest data. Monitor urine output and serial BMP and adjust therapy as needed. Avoid nephrotoxins and renally dose meds for GFR listed above.     Cr 2.1 (baseline 0.8-1.5).  Patient dry on physical exam.  Poor oral intake, most likely prerenal in origin.    Recent Labs   Lab 04/23/23  0400 04/24/23  0548    140   K 3.5 3.8    108   CO2 21* 24   BUN 46* 54*   CREATININE 2.1* 2.5*     Improved with fluids initially  - Nephrology consulted; appreciate assistance   - Paniagua placed after patient retaining on bladder scan 4/23 with hematuria 4/24  - RP u/s no hydronephrosis  - Urine studies pending  - CMP qd, trend Cr  - strict I/O  - daily weights  - renally dose all medications  - avoid nephrotoxic agents, NSAIDs, IV contrast, ACE/ARB     WDL

## 2023-05-26 ENCOUNTER — APPOINTMENT (OUTPATIENT)
Dept: FAMILY MEDICINE | Facility: CLINIC | Age: 53
End: 2023-05-26

## 2023-07-07 ENCOUNTER — NON-APPOINTMENT (OUTPATIENT)
Age: 53
End: 2023-07-07

## 2023-07-13 RX ORDER — NEBULIZER ACCESSORIES
KIT MISCELLANEOUS
Qty: 1 | Refills: 0 | Status: ACTIVE | COMMUNITY
Start: 2020-02-24 | End: 1900-01-01

## 2023-07-13 RX ORDER — ALBUTEROL SULFATE 90 UG/1
108 (90 BASE) INHALANT RESPIRATORY (INHALATION)
Qty: 1 | Refills: 1 | Status: ACTIVE | COMMUNITY
Start: 2021-07-04 | End: 1900-01-01

## 2023-07-25 ENCOUNTER — APPOINTMENT (OUTPATIENT)
Dept: FAMILY MEDICINE | Facility: CLINIC | Age: 53
End: 2023-07-25

## 2023-08-04 ENCOUNTER — RX RENEWAL (OUTPATIENT)
Age: 53
End: 2023-08-04

## 2023-08-04 ENCOUNTER — APPOINTMENT (OUTPATIENT)
Dept: OTOLARYNGOLOGY | Facility: CLINIC | Age: 53
End: 2023-08-04
Payer: MEDICAID

## 2023-08-04 VITALS
HEIGHT: 64 IN | DIASTOLIC BLOOD PRESSURE: 74 MMHG | HEART RATE: 76 BPM | WEIGHT: 185 LBS | SYSTOLIC BLOOD PRESSURE: 112 MMHG | BODY MASS INDEX: 31.58 KG/M2

## 2023-08-04 DIAGNOSIS — K21.9 GASTRO-ESOPHAGEAL REFLUX DISEASE W/OUT ESOPHAGITIS: ICD-10-CM

## 2023-08-04 DIAGNOSIS — J34.2 DEVIATED NASAL SEPTUM: ICD-10-CM

## 2023-08-04 PROCEDURE — 99214 OFFICE O/P EST MOD 30 MIN: CPT | Mod: 25

## 2023-08-04 PROCEDURE — 31231 NASAL ENDOSCOPY DX: CPT

## 2023-08-04 RX ORDER — SODIUM CHLORIDE FOR INHALATION 0.9 %
0.9 VIAL, NEBULIZER (ML) INHALATION
Qty: 300 | Refills: 1 | Status: ACTIVE | COMMUNITY
Start: 2022-11-29 | End: 1900-01-01

## 2023-08-04 RX ORDER — OMEPRAZOLE 20 MG/1
20 TABLET, DELAYED RELEASE ORAL
Qty: 90 | Refills: 2 | Status: ACTIVE | COMMUNITY
Start: 2023-08-04 | End: 1900-01-01

## 2023-08-04 NOTE — HISTORY OF PRESENT ILLNESS
[de-identified] : 52F with several days a month of globus. Has occasional regurgitation at the same time, dysphagia. Has seen GI. Has occasional heartburn. Not currently taking any PPI. 30 pack year smoking history. Hx intranasal drug use, but not recently.

## 2023-08-04 NOTE — PROCEDURE
[FreeTextEntry6] : Procedure: Bilateral nasal endoscopy (CPT 92012)   Indication: Anterior rhinoscopy was inadequate to evaluate pathology.  Left: Septum with early ulcer, bowing right  Moderate inferior turbinate hypertrophy  MM and SER not well visualized secondary to thick debris and adhesion  Right:  Septum deviated right Unable to visualize MM and SER  [de-identified] : NPL: Nasopharynx clear without masses Base of tongue without masses or lesions Vallecula clear Pyriforms clear Epiglottis crisp TVFs mobile bilaterally  Interarytenoid edema and erythema Glottic airway patent

## 2023-08-04 NOTE — ASSESSMENT
[FreeTextEntry1] : 52F with LPR and chronic sinusitis and DNS related to her hx of intranasal drug use.

## 2023-08-04 NOTE — PLAN
[TextEntry] : For her LPR, I recommend PPI. We reviewed timing.   I recommend she continue using navage for her nose. We discussed possible treatment options, but at this time, she is not that bothered and will conitnue navage only.   Fu 3-4 months.

## 2023-08-07 NOTE — ED ADULT NURSE NOTE - ATTEMPT TO OOB
Pt is requesting refills for   testosterone cypionate (DEPO-TESTOSTERONE) 200 MG/ML injectable solution 10 mL      B-D LUER-RAJAT SYRINGE 20G X 1\" 1 ML Misc 4 each     BD Disp Needles 25G X 5/8\" Misc 4 each     Sent to pharmacy on file, thank you!   no

## 2023-08-16 ENCOUNTER — NON-APPOINTMENT (OUTPATIENT)
Age: 53
End: 2023-08-16

## 2023-10-07 ENCOUNTER — NON-APPOINTMENT (OUTPATIENT)
Age: 53
End: 2023-10-07

## 2023-10-25 ENCOUNTER — NON-APPOINTMENT (OUTPATIENT)
Age: 53
End: 2023-10-25

## 2023-11-09 NOTE — H&P PST ADULT - NSANTHOSAYNRD_GEN_A_CORE
Detail Level: Generalized Detail Level: Zone Sunscreen Recommendations: Recommend daily sun protection with SPF 30+\\nPhysical sunblock containing Zinc Oxide or Titanium Dioxide\\nReapply every 2 hours with prolonged sun exposure, water immersion, or sweating Detail Level: Simple No. JUVENAL screening performed.  STOP BANG Legend: 0-2 = LOW Risk; 3-4 = INTERMEDIATE Risk; 5-8 = HIGH Risk

## 2023-12-15 ENCOUNTER — APPOINTMENT (OUTPATIENT)
Dept: FAMILY MEDICINE | Facility: CLINIC | Age: 53
End: 2023-12-15

## 2024-01-13 ENCOUNTER — NON-APPOINTMENT (OUTPATIENT)
Age: 54
End: 2024-01-13

## 2024-03-19 ENCOUNTER — RX RENEWAL (OUTPATIENT)
Age: 54
End: 2024-03-19

## 2024-03-19 RX ORDER — LEVOCETIRIZINE DIHYDROCHLORIDE 5 MG/1
5 TABLET ORAL
Qty: 90 | Refills: 1 | Status: ACTIVE | COMMUNITY
Start: 2023-10-08 | End: 1900-01-01

## 2024-06-01 NOTE — ED ADULT NURSE NOTE - BREATHING, MLM
Patient presents for a screening Mantoux Tuberculin Skin Test for physician order.  Does not have a history of BCG Vaccine.  PPD 5TU 0.1ml placed on Left forearm, 6/1/2024 at 1400 PM.  Patient given instructions to return in 48 to 72 hours to have Skin Test read.    Documented by: Kailee Rodriguez RN   
Spontaneous, unlabored and symmetrical

## 2024-06-06 NOTE — ED PROVIDER NOTE - RELIEVING FACTORS
Show Topical Anesthesia Variable?: Yes Aperture Size (Optional): C Post-Care Instructions: I reviewed with the patient in detail post-care instructions. Patient is to wear sun protection and avoid picking at any of the treated lesions. Pt may apply Vaseline, Polysporin or Mupirocin ointment to crusted or scabbing areas if needed for dry, flaking skin. Duration Of Freeze Thaw-Cycle (Seconds): 2 Include Z78.9 (Other Specified Conditions Influencing Health Status) As An Associated Diagnosis?: No Number Of Freeze-Thaw Cycles: 2 freeze-thaw cycles Spray Paint Text: The liquid nitrogen was applied to the skin utilizing a spray paint frosting technique. Medical Necessity Clause: This procedure was medically necessary because the lesions that were treated were: Consent: The patient's consent was obtained including but not limited to risks of crusting, scabbing, blistering, scarring, darker or lighter pigmentary change, recurrence, incomplete removal and infection.  (see signed consent) Medical Necessity Information: It is in your best interest to select a reason for this procedure from the list below. All of these items fulfill various CMS LCD requirements except the new and changing color options. Detail Level: Detailed none

## 2024-07-08 ENCOUNTER — EMERGENCY (EMERGENCY)
Facility: HOSPITAL | Age: 54
LOS: 1 days | Discharge: HOME | End: 2024-07-08
Attending: EMERGENCY MEDICINE | Admitting: EMERGENCY MEDICINE
Payer: MEDICARE

## 2024-07-08 VITALS
SYSTOLIC BLOOD PRESSURE: 110 MMHG | TEMPERATURE: 98 F | DIASTOLIC BLOOD PRESSURE: 68 MMHG | OXYGEN SATURATION: 94 % | HEART RATE: 81 BPM | RESPIRATION RATE: 18 BRPM

## 2024-07-08 VITALS
HEART RATE: 91 BPM | SYSTOLIC BLOOD PRESSURE: 124 MMHG | WEIGHT: 169.98 LBS | HEIGHT: 64 IN | DIASTOLIC BLOOD PRESSURE: 82 MMHG | TEMPERATURE: 98 F | RESPIRATION RATE: 18 BRPM | OXYGEN SATURATION: 97 %

## 2024-07-08 DIAGNOSIS — Z98.89 OTHER SPECIFIED POSTPROCEDURAL STATES: Chronic | ICD-10-CM

## 2024-07-08 DIAGNOSIS — Z98.51 TUBAL LIGATION STATUS: Chronic | ICD-10-CM

## 2024-07-08 DIAGNOSIS — Z98.890 OTHER SPECIFIED POSTPROCEDURAL STATES: Chronic | ICD-10-CM

## 2024-07-08 LAB
ANION GAP SERPL CALC-SCNC: 5 MMOL/L — SIGNIFICANT CHANGE UP (ref 5–17)
BASOPHILS # BLD AUTO: 0.06 K/UL — SIGNIFICANT CHANGE UP (ref 0–0.2)
BASOPHILS NFR BLD AUTO: 0.6 % — SIGNIFICANT CHANGE UP (ref 0–2)
BUN SERPL-MCNC: 14 MG/DL — SIGNIFICANT CHANGE UP (ref 7–23)
CALCIUM SERPL-MCNC: 9.1 MG/DL — SIGNIFICANT CHANGE UP (ref 8.5–10.1)
CHLORIDE SERPL-SCNC: 111 MMOL/L — HIGH (ref 96–108)
CO2 SERPL-SCNC: 25 MMOL/L — SIGNIFICANT CHANGE UP (ref 22–31)
CREAT SERPL-MCNC: 0.67 MG/DL — SIGNIFICANT CHANGE UP (ref 0.5–1.3)
EGFR: 106 ML/MIN/1.73M2 — SIGNIFICANT CHANGE UP
EOSINOPHIL # BLD AUTO: 1.02 K/UL — HIGH (ref 0–0.5)
EOSINOPHIL NFR BLD AUTO: 9.6 % — HIGH (ref 0–6)
GLUCOSE SERPL-MCNC: 94 MG/DL — SIGNIFICANT CHANGE UP (ref 70–99)
HCT VFR BLD CALC: 36.5 % — SIGNIFICANT CHANGE UP (ref 34.5–45)
HGB BLD-MCNC: 12 G/DL — SIGNIFICANT CHANGE UP (ref 11.5–15.5)
IMM GRANULOCYTES NFR BLD AUTO: 0.3 % — SIGNIFICANT CHANGE UP (ref 0–0.9)
LACTATE SERPL-SCNC: 0.7 MMOL/L — SIGNIFICANT CHANGE UP (ref 0.7–2)
LYMPHOCYTES # BLD AUTO: 1.68 K/UL — SIGNIFICANT CHANGE UP (ref 1–3.3)
LYMPHOCYTES # BLD AUTO: 15.9 % — SIGNIFICANT CHANGE UP (ref 13–44)
MCHC RBC-ENTMCNC: 29.9 PG — SIGNIFICANT CHANGE UP (ref 27–34)
MCHC RBC-ENTMCNC: 32.9 GM/DL — SIGNIFICANT CHANGE UP (ref 32–36)
MCV RBC AUTO: 91 FL — SIGNIFICANT CHANGE UP (ref 80–100)
MONOCYTES # BLD AUTO: 0.9 K/UL — SIGNIFICANT CHANGE UP (ref 0–0.9)
MONOCYTES NFR BLD AUTO: 8.5 % — SIGNIFICANT CHANGE UP (ref 2–14)
NEUTROPHILS # BLD AUTO: 6.88 K/UL — SIGNIFICANT CHANGE UP (ref 1.8–7.4)
NEUTROPHILS NFR BLD AUTO: 65.1 % — SIGNIFICANT CHANGE UP (ref 43–77)
NRBC # BLD: 0 /100 WBCS — SIGNIFICANT CHANGE UP (ref 0–0)
PLATELET # BLD AUTO: 297 K/UL — SIGNIFICANT CHANGE UP (ref 150–400)
POTASSIUM SERPL-MCNC: 4.1 MMOL/L — SIGNIFICANT CHANGE UP (ref 3.5–5.3)
POTASSIUM SERPL-SCNC: 4.1 MMOL/L — SIGNIFICANT CHANGE UP (ref 3.5–5.3)
RBC # BLD: 4.01 M/UL — SIGNIFICANT CHANGE UP (ref 3.8–5.2)
RBC # FLD: 12.3 % — SIGNIFICANT CHANGE UP (ref 10.3–14.5)
SODIUM SERPL-SCNC: 141 MMOL/L — SIGNIFICANT CHANGE UP (ref 135–145)
WBC # BLD: 10.57 K/UL — HIGH (ref 3.8–10.5)
WBC # FLD AUTO: 10.57 K/UL — HIGH (ref 3.8–10.5)

## 2024-07-08 PROCEDURE — 87040 BLOOD CULTURE FOR BACTERIA: CPT

## 2024-07-08 PROCEDURE — 99285 EMERGENCY DEPT VISIT HI MDM: CPT

## 2024-07-08 PROCEDURE — 70487 CT MAXILLOFACIAL W/DYE: CPT | Mod: MC

## 2024-07-08 PROCEDURE — 80048 BASIC METABOLIC PNL TOTAL CA: CPT

## 2024-07-08 PROCEDURE — 83605 ASSAY OF LACTIC ACID: CPT

## 2024-07-08 PROCEDURE — 36415 COLL VENOUS BLD VENIPUNCTURE: CPT

## 2024-07-08 PROCEDURE — 99284 EMERGENCY DEPT VISIT MOD MDM: CPT | Mod: 25

## 2024-07-08 PROCEDURE — 70487 CT MAXILLOFACIAL W/DYE: CPT | Mod: 26,MC

## 2024-07-08 PROCEDURE — 85025 COMPLETE CBC W/AUTO DIFF WBC: CPT

## 2024-07-08 PROCEDURE — 96375 TX/PRO/DX INJ NEW DRUG ADDON: CPT | Mod: XU

## 2024-07-08 PROCEDURE — 96365 THER/PROPH/DIAG IV INF INIT: CPT | Mod: XU

## 2024-07-08 RX ORDER — KETOROLAC TROMETHAMINE 30 MG/ML
30 INJECTION, SOLUTION INTRAMUSCULAR ONCE
Refills: 0 | Status: DISCONTINUED | OUTPATIENT
Start: 2024-07-08 | End: 2024-07-08

## 2024-07-08 RX ORDER — METHYLPREDNISOLONE ACETATE 20 MG/ML
125 VIAL (ML) INJECTION ONCE
Refills: 0 | Status: COMPLETED | OUTPATIENT
Start: 2024-07-08 | End: 2024-07-08

## 2024-07-08 RX ORDER — PREDNISONE 10 MG/1
2 TABLET ORAL
Qty: 8 | Refills: 0
Start: 2024-07-08 | End: 2024-07-11

## 2024-07-08 RX ORDER — CEFTRIAXONE SODIUM 500 MG
1000 VIAL (EA) INJECTION ONCE
Refills: 0 | Status: COMPLETED | OUTPATIENT
Start: 2024-07-08 | End: 2024-07-08

## 2024-07-08 RX ADMIN — Medication 100 MILLIGRAM(S): at 12:22

## 2024-07-08 RX ADMIN — KETOROLAC TROMETHAMINE 30 MILLIGRAM(S): 30 INJECTION, SOLUTION INTRAMUSCULAR at 13:23

## 2024-07-08 RX ADMIN — Medication 1000 MILLIGRAM(S): at 13:27

## 2024-07-08 RX ADMIN — Medication 125 MILLIGRAM(S): at 12:22

## 2024-07-10 ENCOUNTER — APPOINTMENT (OUTPATIENT)
Dept: OTOLARYNGOLOGY | Facility: CLINIC | Age: 54
End: 2024-07-10

## 2024-07-10 ENCOUNTER — NON-APPOINTMENT (OUTPATIENT)
Age: 54
End: 2024-07-10

## 2024-07-10 VITALS
WEIGHT: 170 LBS | SYSTOLIC BLOOD PRESSURE: 139 MMHG | BODY MASS INDEX: 29.02 KG/M2 | HEART RATE: 91 BPM | DIASTOLIC BLOOD PRESSURE: 81 MMHG | HEIGHT: 64 IN

## 2024-07-10 DIAGNOSIS — J34.89 OTHER SPECIFIED DISORDERS OF NOSE AND NASAL SINUSES: ICD-10-CM

## 2024-07-10 DIAGNOSIS — J32.9 CHRONIC SINUSITIS, UNSPECIFIED: ICD-10-CM

## 2024-07-10 PROCEDURE — 31231 NASAL ENDOSCOPY DX: CPT

## 2024-07-10 PROCEDURE — 99214 OFFICE O/P EST MOD 30 MIN: CPT | Mod: 25

## 2024-07-10 RX ORDER — LEVOFLOXACIN 750 MG/1
750 TABLET, FILM COATED ORAL DAILY
Qty: 7 | Refills: 0 | Status: ACTIVE | COMMUNITY
Start: 2024-07-10 | End: 1900-01-01

## 2024-07-13 LAB
CULTURE RESULTS: SIGNIFICANT CHANGE UP
CULTURE RESULTS: SIGNIFICANT CHANGE UP
SPECIMEN SOURCE: SIGNIFICANT CHANGE UP
SPECIMEN SOURCE: SIGNIFICANT CHANGE UP

## 2024-07-15 ENCOUNTER — NON-APPOINTMENT (OUTPATIENT)
Age: 54
End: 2024-07-15

## 2024-07-23 ENCOUNTER — NON-APPOINTMENT (OUTPATIENT)
Age: 54
End: 2024-07-23

## 2024-07-23 ENCOUNTER — APPOINTMENT (OUTPATIENT)
Dept: ORTHOPEDIC SURGERY | Facility: CLINIC | Age: 54
End: 2024-07-23
Payer: MEDICAID

## 2024-07-23 VITALS — BODY MASS INDEX: 29.02 KG/M2 | HEIGHT: 64 IN | WEIGHT: 170 LBS

## 2024-07-23 DIAGNOSIS — S93.409A SPRAIN OF UNSPECIFIED LIGAMENT OF UNSPECIFIED ANKLE, INITIAL ENCOUNTER: ICD-10-CM

## 2024-07-23 DIAGNOSIS — S93.421A SPRAIN OF DELTOID LIGAMENT OF RIGHT ANKLE, INITIAL ENCOUNTER: ICD-10-CM

## 2024-07-23 PROCEDURE — 99204 OFFICE O/P NEW MOD 45 MIN: CPT

## 2024-07-23 PROCEDURE — 73610 X-RAY EXAM OF ANKLE: CPT | Mod: RT

## 2024-07-23 RX ORDER — MELOXICAM 7.5 MG/1
7.5 TABLET ORAL
Qty: 30 | Refills: 0 | Status: ACTIVE | COMMUNITY
Start: 2024-07-23 | End: 1900-01-01

## 2024-07-23 NOTE — HISTORY OF PRESENT ILLNESS
[de-identified] : Date of Injury/Onset: 07/15/2024 Pain: At Rest: 4 With Activity: 7 Mechanism of injury: This is NOT a Work Related Injury being treated under Worker's Compensation. This is NOT an athletic injury occurring associated with an interscholastic or organized sports team. 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 Patient reports injury on 07/15/24, her ankle got stuck in uneven ground inverted, outside of staples. She reports some swelling right away and bruising. Patient went to [facility] the following day, had x-rays taken and was given crutches, though is ambulating with a cane. Patient had been taken Advil and Tylenol and keeps her leg elevated and wrapped her ankle for pain relief, states it helps for pain relief. Patient denies n/t. Of note, pt has ongoing  case for same ankle, was recommended surgery, though never had it done, and states pain was generally improved.

## 2024-07-23 NOTE — IMAGING
[de-identified] : Patient ambulates with a Antalgic gait   Right Foot and Ankle: Inspection: Erythema: None Swelling: Diffuse swelling laterally, anteriorly, medially Ecchymosis: None Abrasions: None Rashes: None Surgical Scars: None Effusion: None Atrophy: None Deformity: None Pes [place] Valgus: Negative Pes Cavus: Negative Hallux Valgus: Negative Clawtoe/Hammertoe: Negative  Palpation: Crepitus: None Proximal Fibula: Nontender Distal Fibula: Nontender Medial Malleolus: tender Lateral Malleolus: tender AITFL: tender PITFL: Nontender ATFL: tender CFL: tender Deltoid: tender Calcaneus: Nontender Talar Head/Neck: Nontender Lateral Process of Talus: Nontender Anterior Facet of Calcaneus: Nontender Peroneal Tendons: tender Posterior Tibialis: Nontender Achilles Tendon: Nontender & Intact Achilles Insertion: Nontender Retrocalcaneal Bursa: Nontender Anterior Capsule: tender Subtalar Joint: Nontender Talonavicular Joint: Nontender Calcaneocuboid Joint: Nontender Heel pad: Nontender Medial Tubercle of Calcaneus: Nontender Plantar Fascia: Nontender Midfoot: Nontender Forefoot: Nontender Sesamoids: Nontender  ROM: Ankle Dorsiflexion: 0 degrees Ankle Plantar Flexion: 35 degrees Eversion: 15 degrees Inversion: 25 degrees, painful  Motor: Dorsiflexion: 5 out of 5 Plantar Flexion: 5 out of 5 Inversion: 5  out of 5 Eversion: 5 out of 5, painful  Provocative Testing: Anterior Drawer: Positive Syndesmosis Squeeze Test: Negative Circumduction test: Negative Resisted ER: Painful  Axial Grind 1st MTP: Painless Neurologic Exam: L4-S1 Sensation: Grossly Intact Tinels: Negative  Vascular Exam/Pulses: Dorsalis Pedis: 2+ Posterior Tibialis: 2+ Capillary Refill: <2 Seconds Other Exams: None Pertinent Contralateral Findings: None

## 2024-07-23 NOTE — DISCUSSION/SUMMARY
[de-identified] : Discussed with patient the pathomechanics and pathophysiology of this condition.  Discussed with patient that I utilize a ladder analogy when determining treatment plans for ankle sprains.  Lowest rung on the ladder and easiest thing to do involves a trial of cam boot immobilization for 10 to 14 days, anti-inflammatories in the form of Mobic (a prescription was provided), weightbearing as tolerated, compression sock (information provided), and a plantar fascia night splint (information provided).  We discussed the risks and benefits associated with nonsteroidal anti-inflammatories.  Those risks include bleeding, kidney, GI issues.  Patient was instructed to take the medications with food.  And she was instructed to discontinue them should any abdominal or stomach discomfort occur.  I will see her back at 2 weeks time.  At that point we will reevaluate her degree of instability and initiate a functional rehabilitation protocol.  She will continue to maintain sleeping in the night splint for approximately 6 weeks in total.  Additionally she will begin on range of motion, edema control no passive or active inversion is to occur for at least 6 weeks.  Should her symptoms fail to improve or worsen by 6 weeks we will consider getting an advanced imaging in the form of MRI to ensure that we are not missing any other concurrent soft tissue pathology or chondral injuries that could be contributing to her symptomatology.  I am optimistic based on her history and physical examination that she will be able to heal this without requiring surgical fixation.  In the event that surgery is needed we discussed that appropriate surgical treatment would be based upon MRI findings, which would include either ligament repair versus reconstruction and potentially arthroscopic surgery, limited versus extensive debridement and addressing any chondral lesions that are present and/or any other associated pathology.  All questions were asked and answered.  Patient is in agreement with the plan.  She will follow-up accordingly.   The patient's current medication management of their orthopedic diagnosis was reviewed today: (1) We discussed a comprehensive treatment plan that included possible pharmaceutical management involving the use of prescription strength medications including but not limited to options such as oral Naprosyn 500mg BID, once daily Meloxicam 15 mg, or 500-650 mg Tylenol versus over the counter oral medications and topical prescription NSAID vs over the counter Voltaren gel. (2) There is a moderate risk of morbidity with further treatment, especially from use of prescription strength medications and possible side effects of these medications which include upset stomach with oral medications, skin reactions to topical medications and cardiac/renal issues with long term use. (3) I recommended that the patient follow-up with their medical physician to discuss any significant specific potential issues with long term medication use such as interactions with current medications or with exacerbation of underlying medical comorbidities. (4) The benefits and risks associated with use of injectable, oral or topical, prescription and over the counter anti-inflammatory medications were discussed with the patient. The patient voiced understanding of the risks including but not limited to bleeding, stroke, kidney dysfunction, heart disease, and were referred to the black box warning label for further information.     Plan for next visit: 1.  Assess ATFL CFL assess anterior drawer assess swelling, consider transition to ASO and PT with restriction of inversion active and passive for the next 4 weeks.

## 2024-07-24 ENCOUNTER — APPOINTMENT (OUTPATIENT)
Dept: OTOLARYNGOLOGY | Facility: CLINIC | Age: 54
End: 2024-07-24
Payer: MEDICAID

## 2024-07-24 VITALS
HEIGHT: 64 IN | BODY MASS INDEX: 29.02 KG/M2 | HEART RATE: 70 BPM | SYSTOLIC BLOOD PRESSURE: 124 MMHG | WEIGHT: 170 LBS | DIASTOLIC BLOOD PRESSURE: 81 MMHG

## 2024-07-24 DIAGNOSIS — J34.89 OTHER SPECIFIED DISORDERS OF NOSE AND NASAL SINUSES: ICD-10-CM

## 2024-07-24 DIAGNOSIS — J32.9 CHRONIC SINUSITIS, UNSPECIFIED: ICD-10-CM

## 2024-07-24 PROCEDURE — 99213 OFFICE O/P EST LOW 20 MIN: CPT

## 2024-07-24 RX ORDER — FLUTICASONE PROPIONATE 50 UG/1
50 SPRAY, METERED NASAL TWICE DAILY
Qty: 1 | Refills: 1 | Status: ACTIVE | COMMUNITY
Start: 2024-07-24 | End: 1900-01-01

## 2024-07-24 NOTE — HISTORY OF PRESENT ILLNESS
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 She notes that two weeks ago, she was diagnosed with impetigo of her fingers which she transferred to her nose. She developed significant nasal irritation. She was put on doxycycline and mupirocin without benefit. She was switched to augmentin. She then developed nasal swelling and was brought to [place] ED. She had CT sinus which showed enhancement of bilateral nasal cavities and bilateral sinus disease. She notes nasal congestion and sinus pressure. She denies vision changes or pain/restriction of extraocular movements. She notes history of sinus infections, none in the past six months until now. No recent fevers or chills. [FreeTextEntry1] : 7/24/24 - Ms. Villalpando presents for follow-up. She compelted levaquin. She is using sinus rinses and mupirocin. She is not using flonase. She notes improvement in nasal congestion and sinus pressure. She notes some discolored drainage. She notes improvement in nasal irritation. No fevers or chills.

## 2024-07-24 NOTE — PHYSICAL EXAM
[Midline] : trachea located in midline position [Normal] : no rashes [de-identified] : Improved erythema of bilateral nasal vestibules. Anterior septal perforation.

## 2024-07-24 NOTE — ASSESSMENT
[FreeTextEntry1] : Bria Villalpando presents for follow-up She has known septal perforation from previous intranasal drug use. She was treated for nasal vestibulitis and chronic sinusitis at last visit with levaquin, sinus rinses, and mupirocin. Her inflammation has significanlty improved. She notes some continued sinonasal symptoms. Will add fluticasone to regimen as she could not find her flonase.  - continue sinus rinses BID - start fluticasone 2 sprays BID - f/u in 2 weeks.

## 2024-07-24 NOTE — REVIEW OF SYSTEMS
[Nasal Congestion] : nasal congestion [Sinus Pressure] : sinus pressure [Discolored Nasal Discharge] : discolored nasal discharge [Negative] : Heme/Lymph

## 2024-08-14 ENCOUNTER — APPOINTMENT (OUTPATIENT)
Dept: OTOLARYNGOLOGY | Facility: CLINIC | Age: 54
End: 2024-08-14

## 2024-08-27 ENCOUNTER — APPOINTMENT (OUTPATIENT)
Dept: ORTHOPEDIC SURGERY | Facility: CLINIC | Age: 54
End: 2024-08-27

## 2024-10-09 ENCOUNTER — EMERGENCY (EMERGENCY)
Facility: HOSPITAL | Age: 54
LOS: 1 days | Discharge: HOME | End: 2024-10-09
Attending: STUDENT IN AN ORGANIZED HEALTH CARE EDUCATION/TRAINING PROGRAM | Admitting: STUDENT IN AN ORGANIZED HEALTH CARE EDUCATION/TRAINING PROGRAM
Payer: MEDICAID

## 2024-10-09 VITALS
HEART RATE: 86 BPM | OXYGEN SATURATION: 95 % | TEMPERATURE: 97 F | WEIGHT: 169.98 LBS | SYSTOLIC BLOOD PRESSURE: 147 MMHG | HEIGHT: 64 IN | RESPIRATION RATE: 18 BRPM | DIASTOLIC BLOOD PRESSURE: 90 MMHG

## 2024-10-09 VITALS
OXYGEN SATURATION: 96 % | RESPIRATION RATE: 16 BRPM | DIASTOLIC BLOOD PRESSURE: 97 MMHG | HEART RATE: 84 BPM | SYSTOLIC BLOOD PRESSURE: 146 MMHG

## 2024-10-09 DIAGNOSIS — Z98.51 TUBAL LIGATION STATUS: Chronic | ICD-10-CM

## 2024-10-09 DIAGNOSIS — Z98.89 OTHER SPECIFIED POSTPROCEDURAL STATES: Chronic | ICD-10-CM

## 2024-10-09 DIAGNOSIS — Z98.890 OTHER SPECIFIED POSTPROCEDURAL STATES: Chronic | ICD-10-CM

## 2024-10-09 LAB
ALBUMIN SERPL ELPH-MCNC: 3.1 G/DL — LOW (ref 3.3–5)
ALP SERPL-CCNC: 58 U/L — SIGNIFICANT CHANGE UP (ref 40–120)
ALT FLD-CCNC: 15 U/L — SIGNIFICANT CHANGE UP (ref 12–78)
ANION GAP SERPL CALC-SCNC: 8 MMOL/L — SIGNIFICANT CHANGE UP (ref 5–17)
APTT BLD: 31.3 SEC — SIGNIFICANT CHANGE UP (ref 24.5–35.6)
AST SERPL-CCNC: 8 U/L — LOW (ref 15–37)
BASOPHILS # BLD AUTO: 0.05 K/UL — SIGNIFICANT CHANGE UP (ref 0–0.2)
BASOPHILS NFR BLD AUTO: 0.5 % — SIGNIFICANT CHANGE UP (ref 0–2)
BILIRUB SERPL-MCNC: 0.3 MG/DL — SIGNIFICANT CHANGE UP (ref 0.2–1.2)
BUN SERPL-MCNC: 14 MG/DL — SIGNIFICANT CHANGE UP (ref 7–23)
CALCIUM SERPL-MCNC: 9 MG/DL — SIGNIFICANT CHANGE UP (ref 8.5–10.1)
CHLORIDE SERPL-SCNC: 109 MMOL/L — HIGH (ref 96–108)
CO2 SERPL-SCNC: 24 MMOL/L — SIGNIFICANT CHANGE UP (ref 22–31)
CREAT SERPL-MCNC: 0.67 MG/DL — SIGNIFICANT CHANGE UP (ref 0.5–1.3)
EGFR: 106 ML/MIN/1.73M2 — SIGNIFICANT CHANGE UP
EGFR: 106 ML/MIN/1.73M2 — SIGNIFICANT CHANGE UP
EOSINOPHIL # BLD AUTO: 1.34 K/UL — HIGH (ref 0–0.5)
EOSINOPHIL NFR BLD AUTO: 12.6 % — HIGH (ref 0–6)
GLUCOSE SERPL-MCNC: 115 MG/DL — HIGH (ref 70–99)
HCG SERPL-ACNC: <1 MIU/ML — SIGNIFICANT CHANGE UP
HCT VFR BLD CALC: 36.8 % — SIGNIFICANT CHANGE UP (ref 34.5–45)
HGB BLD-MCNC: 12 G/DL — SIGNIFICANT CHANGE UP (ref 11.5–15.5)
IMM GRANULOCYTES NFR BLD AUTO: 0.4 % — SIGNIFICANT CHANGE UP (ref 0–0.9)
INR BLD: 1.11 RATIO — SIGNIFICANT CHANGE UP (ref 0.85–1.16)
LACTATE SERPL-SCNC: 0.9 MMOL/L — SIGNIFICANT CHANGE UP (ref 0.7–2)
LYMPHOCYTES # BLD AUTO: 2.76 K/UL — SIGNIFICANT CHANGE UP (ref 1–3.3)
LYMPHOCYTES # BLD AUTO: 25.9 % — SIGNIFICANT CHANGE UP (ref 13–44)
MAGNESIUM SERPL-MCNC: 1.7 MG/DL — SIGNIFICANT CHANGE UP (ref 1.6–2.6)
MCHC RBC-ENTMCNC: 29.9 PG — SIGNIFICANT CHANGE UP (ref 27–34)
MCHC RBC-ENTMCNC: 32.6 GM/DL — SIGNIFICANT CHANGE UP (ref 32–36)
MCV RBC AUTO: 91.5 FL — SIGNIFICANT CHANGE UP (ref 80–100)
MONOCYTES # BLD AUTO: 0.83 K/UL — SIGNIFICANT CHANGE UP (ref 0–0.9)
MONOCYTES NFR BLD AUTO: 7.8 % — SIGNIFICANT CHANGE UP (ref 2–14)
NEUTROPHILS # BLD AUTO: 5.62 K/UL — SIGNIFICANT CHANGE UP (ref 1.8–7.4)
NEUTROPHILS NFR BLD AUTO: 52.8 % — SIGNIFICANT CHANGE UP (ref 43–77)
NRBC # BLD: 0 /100 WBCS — SIGNIFICANT CHANGE UP (ref 0–0)
NRBC BLD-RTO: 0 /100 WBCS — SIGNIFICANT CHANGE UP (ref 0–0)
PLATELET # BLD AUTO: 323 K/UL — SIGNIFICANT CHANGE UP (ref 150–400)
POTASSIUM SERPL-MCNC: 4 MMOL/L — SIGNIFICANT CHANGE UP (ref 3.5–5.3)
POTASSIUM SERPL-SCNC: 4 MMOL/L — SIGNIFICANT CHANGE UP (ref 3.5–5.3)
PROT SERPL-MCNC: 7 G/DL — SIGNIFICANT CHANGE UP (ref 6–8.3)
PROTHROM AB SERPL-ACNC: 13 SEC — SIGNIFICANT CHANGE UP (ref 9.9–13.4)
RBC # BLD: 4.02 M/UL — SIGNIFICANT CHANGE UP (ref 3.8–5.2)
RBC # FLD: 13.1 % — SIGNIFICANT CHANGE UP (ref 10.3–14.5)
SODIUM SERPL-SCNC: 141 MMOL/L — SIGNIFICANT CHANGE UP (ref 135–145)
WBC # BLD: 10.64 K/UL — HIGH (ref 3.8–10.5)
WBC # FLD AUTO: 10.64 K/UL — HIGH (ref 3.8–10.5)

## 2024-10-09 PROCEDURE — 83605 ASSAY OF LACTIC ACID: CPT

## 2024-10-09 PROCEDURE — 96375 TX/PRO/DX INJ NEW DRUG ADDON: CPT | Mod: XU

## 2024-10-09 PROCEDURE — 36415 COLL VENOUS BLD VENIPUNCTURE: CPT

## 2024-10-09 PROCEDURE — 83735 ASSAY OF MAGNESIUM: CPT

## 2024-10-09 PROCEDURE — 85730 THROMBOPLASTIN TIME PARTIAL: CPT

## 2024-10-09 PROCEDURE — 85610 PROTHROMBIN TIME: CPT

## 2024-10-09 PROCEDURE — 70487 CT MAXILLOFACIAL W/DYE: CPT | Mod: MC

## 2024-10-09 PROCEDURE — 96376 TX/PRO/DX INJ SAME DRUG ADON: CPT | Mod: XU

## 2024-10-09 PROCEDURE — 96374 THER/PROPH/DIAG INJ IV PUSH: CPT | Mod: XU

## 2024-10-09 PROCEDURE — 80053 COMPREHEN METABOLIC PANEL: CPT

## 2024-10-09 PROCEDURE — 70487 CT MAXILLOFACIAL W/DYE: CPT | Mod: 26,MC

## 2024-10-09 PROCEDURE — 85025 COMPLETE CBC W/AUTO DIFF WBC: CPT

## 2024-10-09 PROCEDURE — 84702 CHORIONIC GONADOTROPIN TEST: CPT

## 2024-10-09 PROCEDURE — 99284 EMERGENCY DEPT VISIT MOD MDM: CPT | Mod: 25

## 2024-10-09 PROCEDURE — 99285 EMERGENCY DEPT VISIT HI MDM: CPT

## 2024-10-09 RX ORDER — PREDNISONE 20 MG/1
1 TABLET ORAL
Qty: 5 | Refills: 0
Start: 2024-10-09

## 2024-10-09 RX ORDER — KETOROLAC TROMETHAMINE 30 MG/ML
15 INJECTION, SOLUTION INTRAMUSCULAR; INTRAVENOUS ONCE
Refills: 0 | Status: DISCONTINUED | OUTPATIENT
Start: 2024-10-09 | End: 2024-10-09

## 2024-10-09 RX ORDER — CLINDAMYCIN PHOSPHATE 150 MG/ML
1 VIAL (ML) INJECTION
Qty: 21 | Refills: 0
Start: 2024-10-09

## 2024-10-09 RX ORDER — OXYCODONE HYDROCHLORIDE AND ACETAMINOPHEN 10; 325 MG/1; MG/1
1 TABLET ORAL
Qty: 10 | Refills: 0
Start: 2024-10-09

## 2024-10-09 RX ORDER — DEXAMETHASONE 0.5 MG/1
10 TABLET ORAL ONCE
Refills: 0 | Status: COMPLETED | OUTPATIENT
Start: 2024-10-09 | End: 2024-10-09

## 2024-10-09 RX ORDER — CLINDAMYCIN PHOSPHATE 150 MG/ML
600 VIAL (ML) INJECTION ONCE
Refills: 0 | Status: COMPLETED | OUTPATIENT
Start: 2024-10-09 | End: 2024-10-09

## 2024-10-09 RX ADMIN — Medication 1000 MILLILITER(S): at 17:45

## 2024-10-09 RX ADMIN — DEXAMETHASONE 102 MILLIGRAM(S): 0.5 TABLET ORAL at 17:54

## 2024-10-09 RX ADMIN — KETOROLAC TROMETHAMINE 15 MILLIGRAM(S): 30 INJECTION, SOLUTION INTRAMUSCULAR; INTRAVENOUS at 17:45

## 2024-10-09 RX ADMIN — KETOROLAC TROMETHAMINE 15 MILLIGRAM(S): 30 INJECTION, SOLUTION INTRAMUSCULAR; INTRAVENOUS at 20:02

## 2024-10-09 RX ADMIN — Medication 100 MILLIGRAM(S): at 17:45

## 2024-10-09 RX ADMIN — Medication 20 MILLIGRAM(S): at 17:45

## 2024-10-15 ENCOUNTER — APPOINTMENT (OUTPATIENT)
Dept: OTOLARYNGOLOGY | Facility: CLINIC | Age: 54
End: 2024-10-15
Payer: MEDICAID

## 2024-10-15 VITALS
HEIGHT: 64 IN | WEIGHT: 170 LBS | HEART RATE: 76 BPM | DIASTOLIC BLOOD PRESSURE: 72 MMHG | SYSTOLIC BLOOD PRESSURE: 132 MMHG | BODY MASS INDEX: 29.02 KG/M2

## 2024-10-15 DIAGNOSIS — J32.9 CHRONIC SINUSITIS, UNSPECIFIED: ICD-10-CM

## 2024-10-15 DIAGNOSIS — J34.89 OTHER SPECIFIED DISORDERS OF NOSE AND NASAL SINUSES: ICD-10-CM

## 2024-10-15 PROCEDURE — 99213 OFFICE O/P EST LOW 20 MIN: CPT

## 2024-10-15 RX ORDER — BUPROPION HYDROCHLORIDE 100 MG/1
TABLET, FILM COATED ORAL
Refills: 0 | Status: ACTIVE | COMMUNITY

## 2024-10-15 RX ORDER — FLUTICASONE PROPIONATE 50 UG/1
50 SPRAY, METERED NASAL TWICE DAILY
Qty: 1 | Refills: 1 | Status: ACTIVE | COMMUNITY
Start: 2024-10-15 | End: 1900-01-01

## 2024-10-15 RX ORDER — DEXTROAMPHETAMINE SACCHARATE, AMPHETAMINE ASPARTATE, DEXTROAMPHETAMINE SULFATE, AND AMPHETAMINE SULFATE 7.5; 7.5; 7.5; 7.5 MG/1; MG/1; MG/1; MG/1
30 TABLET ORAL
Refills: 0 | Status: ACTIVE | COMMUNITY

## 2024-10-15 RX ORDER — CLINDAMYCIN HYDROCHLORIDE 300 MG/1
300 CAPSULE ORAL
Refills: 0 | Status: ACTIVE | COMMUNITY

## 2024-11-07 ENCOUNTER — APPOINTMENT (OUTPATIENT)
Dept: OTOLARYNGOLOGY | Facility: CLINIC | Age: 54
End: 2024-11-07

## 2024-11-09 ENCOUNTER — NON-APPOINTMENT (OUTPATIENT)
Age: 54
End: 2024-11-09

## 2024-11-21 ENCOUNTER — APPOINTMENT (OUTPATIENT)
Dept: OTOLARYNGOLOGY | Facility: CLINIC | Age: 54
End: 2024-11-21
Payer: MEDICAID

## 2024-11-21 VITALS
DIASTOLIC BLOOD PRESSURE: 80 MMHG | SYSTOLIC BLOOD PRESSURE: 120 MMHG | WEIGHT: 177 LBS | HEIGHT: 64 IN | HEART RATE: 82 BPM | BODY MASS INDEX: 30.22 KG/M2

## 2024-11-21 DIAGNOSIS — J32.9 CHRONIC SINUSITIS, UNSPECIFIED: ICD-10-CM

## 2024-11-21 DIAGNOSIS — J34.89 OTHER SPECIFIED DISORDERS OF NOSE AND NASAL SINUSES: ICD-10-CM

## 2024-11-21 PROCEDURE — 99213 OFFICE O/P EST LOW 20 MIN: CPT | Mod: 25

## 2024-11-21 PROCEDURE — 31231 NASAL ENDOSCOPY DX: CPT

## 2024-12-17 ENCOUNTER — APPOINTMENT (OUTPATIENT)
Dept: OTOLARYNGOLOGY | Facility: CLINIC | Age: 54
End: 2024-12-17
Payer: MEDICAID

## 2024-12-17 VITALS
SYSTOLIC BLOOD PRESSURE: 137 MMHG | BODY MASS INDEX: 29.88 KG/M2 | HEART RATE: 90 BPM | DIASTOLIC BLOOD PRESSURE: 83 MMHG | WEIGHT: 175 LBS | HEIGHT: 64 IN

## 2024-12-17 DIAGNOSIS — J32.9 CHRONIC SINUSITIS, UNSPECIFIED: ICD-10-CM

## 2024-12-17 DIAGNOSIS — J34.89 OTHER SPECIFIED DISORDERS OF NOSE AND NASAL SINUSES: ICD-10-CM

## 2024-12-17 DIAGNOSIS — J34.2 DEVIATED NASAL SEPTUM: ICD-10-CM

## 2024-12-17 PROCEDURE — 99214 OFFICE O/P EST MOD 30 MIN: CPT | Mod: 25

## 2024-12-17 PROCEDURE — 31231 NASAL ENDOSCOPY DX: CPT

## 2024-12-17 RX ORDER — PREDNISONE 10 MG/1
10 TABLET ORAL
Qty: 30 | Refills: 0 | Status: ACTIVE | COMMUNITY
Start: 2024-12-17 | End: 1900-01-01

## 2024-12-20 RX ORDER — TRAMADOL HYDROCHLORIDE 25 MG/1
25 TABLET, COATED ORAL EVERY 6 HOURS
Qty: 8 | Refills: 0 | Status: ACTIVE | COMMUNITY
Start: 2024-12-20 | End: 1900-01-01

## 2024-12-21 ENCOUNTER — NON-APPOINTMENT (OUTPATIENT)
Age: 54
End: 2024-12-21

## 2025-01-02 ENCOUNTER — NON-APPOINTMENT (OUTPATIENT)
Age: 55
End: 2025-01-02

## 2025-01-06 ENCOUNTER — LABORATORY RESULT (OUTPATIENT)
Age: 55
End: 2025-01-06

## 2025-01-13 ENCOUNTER — NON-APPOINTMENT (OUTPATIENT)
Age: 55
End: 2025-01-13

## 2025-01-18 ENCOUNTER — NON-APPOINTMENT (OUTPATIENT)
Age: 55
End: 2025-01-18

## 2025-02-03 ENCOUNTER — APPOINTMENT (OUTPATIENT)
Dept: RHEUMATOLOGY | Facility: CLINIC | Age: 55
End: 2025-02-03

## 2025-02-06 RX ORDER — METHYLPREDNISOLONE 4 MG/1
4 TABLET ORAL
Qty: 1 | Refills: 0 | Status: ACTIVE | COMMUNITY
Start: 2025-02-06 | End: 1900-01-01

## 2025-03-04 ENCOUNTER — NON-APPOINTMENT (OUTPATIENT)
Age: 55
End: 2025-03-04

## 2025-04-28 ENCOUNTER — NON-APPOINTMENT (OUTPATIENT)
Age: 55
End: 2025-04-28

## 2025-04-29 ENCOUNTER — APPOINTMENT (OUTPATIENT)
Dept: RHEUMATOLOGY | Facility: CLINIC | Age: 55
End: 2025-04-29

## 2025-04-29 ENCOUNTER — OUTPATIENT (OUTPATIENT)
Dept: OUTPATIENT SERVICES | Facility: HOSPITAL | Age: 55
LOS: 1 days | End: 2025-04-29
Payer: MEDICAID

## 2025-04-29 ENCOUNTER — APPOINTMENT (OUTPATIENT)
Dept: MAMMOGRAPHY | Facility: CLINIC | Age: 55
End: 2025-04-29
Payer: MEDICAID

## 2025-04-29 ENCOUNTER — APPOINTMENT (OUTPATIENT)
Dept: ULTRASOUND IMAGING | Facility: CLINIC | Age: 55
End: 2025-04-29
Payer: MEDICAID

## 2025-04-29 DIAGNOSIS — Z98.890 OTHER SPECIFIED POSTPROCEDURAL STATES: Chronic | ICD-10-CM

## 2025-04-29 DIAGNOSIS — Z98.89 OTHER SPECIFIED POSTPROCEDURAL STATES: Chronic | ICD-10-CM

## 2025-04-29 DIAGNOSIS — Z98.51 TUBAL LIGATION STATUS: Chronic | ICD-10-CM

## 2025-04-29 DIAGNOSIS — Z00.00 ENCOUNTER FOR GENERAL ADULT MEDICAL EXAMINATION WITHOUT ABNORMAL FINDINGS: ICD-10-CM

## 2025-04-29 PROCEDURE — 77063 BREAST TOMOSYNTHESIS BI: CPT

## 2025-04-29 PROCEDURE — 76536 US EXAM OF HEAD AND NECK: CPT | Mod: 26

## 2025-04-29 PROCEDURE — 77067 SCR MAMMO BI INCL CAD: CPT | Mod: 26

## 2025-04-29 PROCEDURE — 77063 BREAST TOMOSYNTHESIS BI: CPT | Mod: 26

## 2025-04-29 PROCEDURE — 77067 SCR MAMMO BI INCL CAD: CPT

## 2025-04-29 PROCEDURE — 76536 US EXAM OF HEAD AND NECK: CPT

## 2025-04-30 ENCOUNTER — NON-APPOINTMENT (OUTPATIENT)
Age: 55
End: 2025-04-30

## 2025-05-06 ENCOUNTER — APPOINTMENT (OUTPATIENT)
Dept: OTOLARYNGOLOGY | Facility: CLINIC | Age: 55
End: 2025-05-06

## 2025-05-06 DIAGNOSIS — J32.9 CHRONIC SINUSITIS, UNSPECIFIED: ICD-10-CM

## 2025-05-06 DIAGNOSIS — J34.2 DEVIATED NASAL SEPTUM: ICD-10-CM

## 2025-05-14 ENCOUNTER — NON-APPOINTMENT (OUTPATIENT)
Age: 55
End: 2025-05-14

## 2025-05-20 ENCOUNTER — APPOINTMENT (OUTPATIENT)
Dept: OTOLARYNGOLOGY | Facility: CLINIC | Age: 55
End: 2025-05-20

## 2025-05-20 DIAGNOSIS — J32.9 CHRONIC SINUSITIS, UNSPECIFIED: ICD-10-CM

## 2025-05-20 DIAGNOSIS — J34.2 DEVIATED NASAL SEPTUM: ICD-10-CM

## 2025-06-02 ENCOUNTER — INPATIENT (INPATIENT)
Facility: HOSPITAL | Age: 55
LOS: 8 days | Discharge: HOME | DRG: 153 | End: 2025-06-11
Attending: FAMILY MEDICINE
Payer: MEDICAID

## 2025-06-02 VITALS
RESPIRATION RATE: 20 BRPM | HEIGHT: 64 IN | SYSTOLIC BLOOD PRESSURE: 129 MMHG | OXYGEN SATURATION: 94 % | HEART RATE: 88 BPM | TEMPERATURE: 99 F | DIASTOLIC BLOOD PRESSURE: 85 MMHG | WEIGHT: 179.9 LBS

## 2025-06-02 DIAGNOSIS — Z98.890 OTHER SPECIFIED POSTPROCEDURAL STATES: Chronic | ICD-10-CM

## 2025-06-02 DIAGNOSIS — Z98.89 OTHER SPECIFIED POSTPROCEDURAL STATES: Chronic | ICD-10-CM

## 2025-06-02 DIAGNOSIS — Z98.51 TUBAL LIGATION STATUS: Chronic | ICD-10-CM

## 2025-06-02 LAB
ALBUMIN SERPL ELPH-MCNC: 2.8 G/DL — LOW (ref 3.3–5)
ALP SERPL-CCNC: 63 U/L — SIGNIFICANT CHANGE UP (ref 40–120)
ALT FLD-CCNC: 15 U/L — SIGNIFICANT CHANGE UP (ref 12–78)
ANION GAP SERPL CALC-SCNC: 6 MMOL/L — SIGNIFICANT CHANGE UP (ref 5–17)
AST SERPL-CCNC: 4 U/L — LOW (ref 15–37)
BASOPHILS # BLD AUTO: 0.02 K/UL — SIGNIFICANT CHANGE UP (ref 0–0.2)
BASOPHILS NFR BLD AUTO: 0.2 % — SIGNIFICANT CHANGE UP (ref 0–2)
BILIRUB SERPL-MCNC: 0.2 MG/DL — SIGNIFICANT CHANGE UP (ref 0.2–1.2)
BUN SERPL-MCNC: 17 MG/DL — SIGNIFICANT CHANGE UP (ref 7–23)
CALCIUM SERPL-MCNC: 8.8 MG/DL — SIGNIFICANT CHANGE UP (ref 8.5–10.1)
CHLORIDE SERPL-SCNC: 110 MMOL/L — HIGH (ref 96–108)
CO2 SERPL-SCNC: 26 MMOL/L — SIGNIFICANT CHANGE UP (ref 22–31)
CREAT SERPL-MCNC: 0.67 MG/DL — SIGNIFICANT CHANGE UP (ref 0.5–1.3)
EGFR: 105 ML/MIN/1.73M2 — SIGNIFICANT CHANGE UP
EGFR: 105 ML/MIN/1.73M2 — SIGNIFICANT CHANGE UP
EOSINOPHIL # BLD AUTO: 0.1 K/UL — SIGNIFICANT CHANGE UP (ref 0–0.5)
EOSINOPHIL NFR BLD AUTO: 1 % — SIGNIFICANT CHANGE UP (ref 0–6)
GLUCOSE SERPL-MCNC: 112 MG/DL — HIGH (ref 70–99)
HCG SERPL-ACNC: <1 MIU/ML — SIGNIFICANT CHANGE UP
HCT VFR BLD CALC: 34.1 % — LOW (ref 34.5–45)
HGB BLD-MCNC: 11.4 G/DL — LOW (ref 11.5–15.5)
IMM GRANULOCYTES NFR BLD AUTO: 0.7 % — SIGNIFICANT CHANGE UP (ref 0–0.9)
LYMPHOCYTES # BLD AUTO: 1.6 K/UL — SIGNIFICANT CHANGE UP (ref 1–3.3)
LYMPHOCYTES # BLD AUTO: 16.7 % — SIGNIFICANT CHANGE UP (ref 13–44)
MCHC RBC-ENTMCNC: 29.6 PG — SIGNIFICANT CHANGE UP (ref 27–34)
MCHC RBC-ENTMCNC: 33.4 G/DL — SIGNIFICANT CHANGE UP (ref 32–36)
MCV RBC AUTO: 88.6 FL — SIGNIFICANT CHANGE UP (ref 80–100)
MONOCYTES # BLD AUTO: 0.73 K/UL — SIGNIFICANT CHANGE UP (ref 0–0.9)
MONOCYTES NFR BLD AUTO: 7.6 % — SIGNIFICANT CHANGE UP (ref 2–14)
NEUTROPHILS # BLD AUTO: 7.07 K/UL — SIGNIFICANT CHANGE UP (ref 1.8–7.4)
NEUTROPHILS NFR BLD AUTO: 73.8 % — SIGNIFICANT CHANGE UP (ref 43–77)
NRBC BLD AUTO-RTO: 0 /100 WBCS — SIGNIFICANT CHANGE UP (ref 0–0)
PLATELET # BLD AUTO: 350 K/UL — SIGNIFICANT CHANGE UP (ref 150–400)
POTASSIUM SERPL-MCNC: 3.3 MMOL/L — LOW (ref 3.5–5.3)
POTASSIUM SERPL-SCNC: 3.3 MMOL/L — LOW (ref 3.5–5.3)
PROT SERPL-MCNC: 6.7 G/DL — SIGNIFICANT CHANGE UP (ref 6–8.3)
RBC # BLD: 3.85 M/UL — SIGNIFICANT CHANGE UP (ref 3.8–5.2)
RBC # FLD: 13.1 % — SIGNIFICANT CHANGE UP (ref 10.3–14.5)
SODIUM SERPL-SCNC: 142 MMOL/L — SIGNIFICANT CHANGE UP (ref 135–145)
WBC # BLD: 9.59 K/UL — SIGNIFICANT CHANGE UP (ref 3.8–10.5)
WBC # FLD AUTO: 9.59 K/UL — SIGNIFICANT CHANGE UP (ref 3.8–10.5)

## 2025-06-02 PROCEDURE — 99285 EMERGENCY DEPT VISIT HI MDM: CPT

## 2025-06-02 PROCEDURE — 70487 CT MAXILLOFACIAL W/DYE: CPT | Mod: 26

## 2025-06-02 PROCEDURE — 71046 X-RAY EXAM CHEST 2 VIEWS: CPT | Mod: 26

## 2025-06-02 RX ORDER — MAGNESIUM SULFATE 500 MG/ML
2 SYRINGE (ML) INJECTION ONCE
Refills: 0 | Status: COMPLETED | OUTPATIENT
Start: 2025-06-02 | End: 2025-06-02

## 2025-06-02 RX ORDER — IPRATROPIUM BROMIDE AND ALBUTEROL SULFATE .5; 2.5 MG/3ML; MG/3ML
3 SOLUTION RESPIRATORY (INHALATION)
Refills: 0 | Status: DISCONTINUED | OUTPATIENT
Start: 2025-06-02 | End: 2025-06-02

## 2025-06-02 RX ORDER — AMOXICILLIN AND CLAVULANATE POTASSIUM 500; 125 MG/1; MG/1
1 TABLET, FILM COATED ORAL ONCE
Refills: 0 | Status: COMPLETED | OUTPATIENT
Start: 2025-06-02 | End: 2025-06-02

## 2025-06-02 RX ORDER — METHYLPREDNISOLONE ACETATE 80 MG/ML
125 INJECTION, SUSPENSION INTRA-ARTICULAR; INTRALESIONAL; INTRAMUSCULAR; SOFT TISSUE ONCE
Refills: 0 | Status: COMPLETED | OUTPATIENT
Start: 2025-06-02 | End: 2025-06-02

## 2025-06-02 RX ORDER — HYDROMORPHONE/SOD CHLOR,ISO/PF 2 MG/10 ML
1 SYRINGE (ML) INJECTION ONCE
Refills: 0 | Status: DISCONTINUED | OUTPATIENT
Start: 2025-06-02 | End: 2025-06-02

## 2025-06-02 RX ADMIN — IPRATROPIUM BROMIDE AND ALBUTEROL SULFATE 3 MILLILITER(S): .5; 2.5 SOLUTION RESPIRATORY (INHALATION) at 17:58

## 2025-06-02 RX ADMIN — AMOXICILLIN AND CLAVULANATE POTASSIUM 1 TABLET(S): 500; 125 TABLET, FILM COATED ORAL at 16:37

## 2025-06-02 RX ADMIN — Medication 40 MILLIEQUIVALENT(S): at 19:51

## 2025-06-02 RX ADMIN — Medication 4 MILLIGRAM(S): at 19:51

## 2025-06-02 RX ADMIN — Medication 1 MILLIGRAM(S): at 22:30

## 2025-06-02 RX ADMIN — Medication 150 GRAM(S): at 17:58

## 2025-06-02 RX ADMIN — METHYLPREDNISOLONE ACETATE 125 MILLIGRAM(S): 80 INJECTION, SUSPENSION INTRA-ARTICULAR; INTRALESIONAL; INTRAMUSCULAR; SOFT TISSUE at 16:38

## 2025-06-02 NOTE — ED PROVIDER NOTE - PROGRESS NOTE DETAILS
[facility] consulted ENT still awaiting call back from tx center   pt still having pain [facility]   Spoke W/ Dr Szymanski from [facility] rec no transfer given believes more in line w/ symptoms vasculitis

## 2025-06-02 NOTE — ED PROVIDER NOTE - CLINICAL SUMMARY MEDICAL DECISION MAKING FREE TEXT BOX
[facility]   54F cc sinusitis pt notes she has been having a dx of some form of vasculitis has been seeing rheum, ENT, last visit was 1-2 months ago, pt notes she is having severe facial pain and is having thick mucous present from the nose. PT notes no fever, some nausea,     GENERAL: Awake, alert, NAD  HEENT thick dried mucous in b/l nasal canals, w/ some swelling to the nose mild tenderness along all the sinuses   LUNGS: non labored breathing   CARDIAC: RRR, no m/r/g  ABDOMEN: Soft, , non tender, non distended, no rebound, no guarding  EXT: No edema, no calf tenderness, no deformities.  NEURO: A&Ox3. Moving all extremities.  SKIN: Warm and dry. No rash.  PSYCH: Normal affect.     given hx and pe plan for ct ma face assess severity of sinusitis abx pain medication

## 2025-06-02 NOTE — ED ADULT NURSE NOTE - OBJECTIVE STATEMENT
Pt received in rm 13A 54y female AXO 4 is ambulatory from home c/o SOB. Pt states she developed SOB. Pt states she went to urgent care and was sent to [place] ed to be evaluated. Upon assessment pts nose is swollen and pt has difficulty breathing and endorses pain. Pt denies chest pain, fever, chills, nausea, vomiting, diarrhea and constipation. Left 20 AC placed, labs drawn, meds given as prescribed. pending results.

## 2025-06-02 NOTE — ED ADULT TRIAGE NOTE - CHIEF COMPLAINT QUOTE
pt went to  for sinus infection, was given PO ABT with minimal relief. Pt states she went to ENT and was told she has Vasculitis. pt now has c/o increase facial pain, trouble breathing pulse ox 94%. pt has been using nebulizer. pt has been placing warm compress on nose for swelling. Denies CP/n/v. c/o SOB, HA, change in vision due to pressure as per pt. pt did not take any medication prior to arrival. med hx asthma, ADHD, vasculitis.

## 2025-06-02 NOTE — ED ADULT NURSE NOTE - NSFALLHARMRISKINTERV_ED_ALL_ED

## 2025-06-02 NOTE — ED PROVIDER NOTE - AVIAN FLU WORK
9.3    5.44  )-----------( 121      ( 21 Aug 2020 07:08 )             28.4   Patient seen and examined at bedside. Reports no acute complaints at this time. Pain is well controlled. No other acute events overnight.    Vital Signs Last 24 Hrs  T(C): 36.6 (22 Aug 2020 05:07), Max: 36.7 (21 Aug 2020 11:21)  T(F): 97.9 (22 Aug 2020 05:07), Max: 98.1 (21 Aug 2020 11:21)  HR: 69 (22 Aug 2020 05:07) (69 - 86)  BP: 148/80 (22 Aug 2020 05:07) (115/78 - 150/85)  BP(mean): --  RR: 18 (22 Aug 2020 05:07) (18 - 18)  SpO2: 96% (22 Aug 2020 05:07) (96% - 99%)    Gen: NAD, laying comfortably in bed  Resp: Unlabored breathing  MSK:   LLE:  Dressing c/d/i, prevena  HV SS          +EHL/FHL/TA/Gas/SOl          +DP/SP/Vita/Saph/T           2+DP  Abd pillow in place      LABS: No

## 2025-06-02 NOTE — ED ADULT NURSE NOTE - AVIAN FLU WORK
Date & Time: 2/3/2020, 9:26 AM  Patient: Khadar Mina  Encounter Provider(s):    Nadia Chan MD       To Whom It May Concern:    Khadar Mina was seen and treated in our department on 2/3/2020.    If you have any questions or
No

## 2025-06-03 DIAGNOSIS — J32.9 CHRONIC SINUSITIS, UNSPECIFIED: ICD-10-CM

## 2025-06-03 DIAGNOSIS — E87.8 OTHER DISORDERS OF ELECTROLYTE AND FLUID BALANCE, NOT ELSEWHERE CLASSIFIED: ICD-10-CM

## 2025-06-03 DIAGNOSIS — R19.7 DIARRHEA, UNSPECIFIED: ICD-10-CM

## 2025-06-03 DIAGNOSIS — D64.9 ANEMIA, UNSPECIFIED: ICD-10-CM

## 2025-06-03 DIAGNOSIS — I77.6 ARTERITIS, UNSPECIFIED: ICD-10-CM

## 2025-06-03 DIAGNOSIS — J45.909 UNSPECIFIED ASTHMA, UNCOMPLICATED: ICD-10-CM

## 2025-06-03 DIAGNOSIS — Z29.9 ENCOUNTER FOR PROPHYLACTIC MEASURES, UNSPECIFIED: ICD-10-CM

## 2025-06-03 LAB
ALBUMIN SERPL ELPH-MCNC: 2.8 G/DL — LOW (ref 3.3–5)
ALP SERPL-CCNC: 57 U/L — SIGNIFICANT CHANGE UP (ref 40–120)
ALT FLD-CCNC: 14 U/L — SIGNIFICANT CHANGE UP (ref 12–78)
ANION GAP SERPL CALC-SCNC: 8 MMOL/L — SIGNIFICANT CHANGE UP (ref 5–17)
AST SERPL-CCNC: 8 U/L — LOW (ref 15–37)
BILIRUB SERPL-MCNC: 0.2 MG/DL — SIGNIFICANT CHANGE UP (ref 0.2–1.2)
BUN SERPL-MCNC: 20 MG/DL — SIGNIFICANT CHANGE UP (ref 7–23)
CALCIUM SERPL-MCNC: 8.6 MG/DL — SIGNIFICANT CHANGE UP (ref 8.5–10.1)
CHLORIDE SERPL-SCNC: 106 MMOL/L — SIGNIFICANT CHANGE UP (ref 96–108)
CHOLEST SERPL-MCNC: 148 MG/DL — SIGNIFICANT CHANGE UP
CO2 SERPL-SCNC: 23 MMOL/L — SIGNIFICANT CHANGE UP (ref 22–31)
CREAT SERPL-MCNC: 0.59 MG/DL — SIGNIFICANT CHANGE UP (ref 0.5–1.3)
CRP SERPL-MCNC: 10 MG/L — HIGH
CRP SERPL-MCNC: 10 MG/L — HIGH
EGFR: 108 ML/MIN/1.73M2 — SIGNIFICANT CHANGE UP
EGFR: 108 ML/MIN/1.73M2 — SIGNIFICANT CHANGE UP
ERYTHROCYTE [SEDIMENTATION RATE] IN BLOOD: 23 MM/HR — HIGH (ref 0–20)
FERRITIN SERPL-MCNC: 47 NG/ML — SIGNIFICANT CHANGE UP (ref 13–330)
GLUCOSE SERPL-MCNC: 89 MG/DL — SIGNIFICANT CHANGE UP (ref 70–99)
HCT VFR BLD CALC: 32.5 % — LOW (ref 34.5–45)
HDLC SERPL-MCNC: 60 MG/DL — SIGNIFICANT CHANGE UP
HGB BLD-MCNC: 10.9 G/DL — LOW (ref 11.5–15.5)
IRON SATN MFR SERPL: 18 % — SIGNIFICANT CHANGE UP (ref 14–50)
IRON SATN MFR SERPL: 55 UG/DL — SIGNIFICANT CHANGE UP (ref 30–160)
LDLC SERPL-MCNC: 72 MG/DL — SIGNIFICANT CHANGE UP
LIPID PNL WITH DIRECT LDL SERPL: 72 MG/DL — SIGNIFICANT CHANGE UP
MAGNESIUM SERPL-MCNC: 2.3 MG/DL — SIGNIFICANT CHANGE UP (ref 1.6–2.6)
MCHC RBC-ENTMCNC: 30 PG — SIGNIFICANT CHANGE UP (ref 27–34)
MCHC RBC-ENTMCNC: 33.5 G/DL — SIGNIFICANT CHANGE UP (ref 32–36)
MCV RBC AUTO: 89.5 FL — SIGNIFICANT CHANGE UP (ref 80–100)
NONHDLC SERPL-MCNC: 88 MG/DL — SIGNIFICANT CHANGE UP
NRBC BLD AUTO-RTO: 0 /100 WBCS — SIGNIFICANT CHANGE UP (ref 0–0)
PHOSPHATE SERPL-MCNC: 3.4 MG/DL — SIGNIFICANT CHANGE UP (ref 2.5–4.5)
PLATELET # BLD AUTO: 398 K/UL — SIGNIFICANT CHANGE UP (ref 150–400)
POTASSIUM SERPL-MCNC: 3.8 MMOL/L — SIGNIFICANT CHANGE UP (ref 3.5–5.3)
POTASSIUM SERPL-SCNC: 3.8 MMOL/L — SIGNIFICANT CHANGE UP (ref 3.5–5.3)
PROT SERPL-MCNC: 6.4 G/DL — SIGNIFICANT CHANGE UP (ref 6–8.3)
RBC # BLD: 3.63 M/UL — LOW (ref 3.8–5.2)
RBC # FLD: 13.1 % — SIGNIFICANT CHANGE UP (ref 10.3–14.5)
SODIUM SERPL-SCNC: 137 MMOL/L — SIGNIFICANT CHANGE UP (ref 135–145)
TIBC SERPL-MCNC: 303 UG/DL — SIGNIFICANT CHANGE UP (ref 220–430)
TRANSFERRIN SERPL-MCNC: 241 MG/DL — SIGNIFICANT CHANGE UP (ref 200–360)
TRIGL SERPL-MCNC: 81 MG/DL — SIGNIFICANT CHANGE UP
UIBC SERPL-MCNC: 248 UG/DL — SIGNIFICANT CHANGE UP (ref 110–370)
WBC # BLD: 12.9 K/UL — HIGH (ref 3.8–10.5)
WBC # FLD AUTO: 12.9 K/UL — HIGH (ref 3.8–10.5)

## 2025-06-03 PROCEDURE — 99223 1ST HOSP IP/OBS HIGH 75: CPT | Mod: GC

## 2025-06-03 PROCEDURE — 12345: CPT | Mod: NC

## 2025-06-03 PROCEDURE — 99223 1ST HOSP IP/OBS HIGH 75: CPT

## 2025-06-03 RX ORDER — VANCOMYCIN HCL IN 5 % DEXTROSE 1.5G/250ML
1000 PLASTIC BAG, INJECTION (ML) INTRAVENOUS EVERY 12 HOURS
Refills: 0 | Status: COMPLETED | OUTPATIENT
Start: 2025-06-03 | End: 2025-06-06

## 2025-06-03 RX ORDER — HYDROMORPHONE/SOD CHLOR,ISO/PF 2 MG/10 ML
1 SYRINGE (ML) INJECTION EVERY 6 HOURS
Refills: 0 | Status: DISCONTINUED | OUTPATIENT
Start: 2025-06-03 | End: 2025-06-03

## 2025-06-03 RX ORDER — PIPERACILLIN-TAZO-DEXTROSE,ISO 3.375G/5
3.38 IV SOLUTION, PIGGYBACK PREMIX FROZEN(ML) INTRAVENOUS ONCE
Refills: 0 | Status: DISCONTINUED | OUTPATIENT
Start: 2025-06-03 | End: 2025-06-03

## 2025-06-03 RX ORDER — HYDROMORPHONE/SOD CHLOR,ISO/PF 2 MG/10 ML
1 SYRINGE (ML) INJECTION EVERY 4 HOURS
Refills: 0 | Status: DISCONTINUED | OUTPATIENT
Start: 2025-06-03 | End: 2025-06-04

## 2025-06-03 RX ORDER — PIPERACILLIN-TAZO-DEXTROSE,ISO 3.375G/5
3.38 IV SOLUTION, PIGGYBACK PREMIX FROZEN(ML) INTRAVENOUS ONCE
Refills: 0 | Status: COMPLETED | OUTPATIENT
Start: 2025-06-03 | End: 2025-06-03

## 2025-06-03 RX ORDER — ALBUTEROL SULFATE 2.5 MG/3ML
2 VIAL, NEBULIZER (ML) INHALATION EVERY 6 HOURS
Refills: 0 | Status: DISCONTINUED | OUTPATIENT
Start: 2025-06-03 | End: 2025-06-11

## 2025-06-03 RX ORDER — HYDROMORPHONE/SOD CHLOR,ISO/PF 2 MG/10 ML
0.5 SYRINGE (ML) INJECTION EVERY 6 HOURS
Refills: 0 | Status: DISCONTINUED | OUTPATIENT
Start: 2025-06-03 | End: 2025-06-04

## 2025-06-03 RX ORDER — ONDANSETRON HCL/PF 4 MG/2 ML
4 VIAL (ML) INJECTION EVERY 8 HOURS
Refills: 0 | Status: DISCONTINUED | OUTPATIENT
Start: 2025-06-03 | End: 2025-06-11

## 2025-06-03 RX ORDER — ENOXAPARIN SODIUM 100 MG/ML
40 INJECTION SUBCUTANEOUS EVERY 24 HOURS
Refills: 0 | Status: DISCONTINUED | OUTPATIENT
Start: 2025-06-03 | End: 2025-06-11

## 2025-06-03 RX ORDER — PIPERACILLIN-TAZO-DEXTROSE,ISO 3.375G/5
3.38 IV SOLUTION, PIGGYBACK PREMIX FROZEN(ML) INTRAVENOUS EVERY 8 HOURS
Refills: 0 | Status: DISCONTINUED | OUTPATIENT
Start: 2025-06-03 | End: 2025-06-04

## 2025-06-03 RX ORDER — ACETAMINOPHEN 500 MG/5ML
650 LIQUID (ML) ORAL EVERY 6 HOURS
Refills: 0 | Status: DISCONTINUED | OUTPATIENT
Start: 2025-06-03 | End: 2025-06-11

## 2025-06-03 RX ORDER — IPRATROPIUM BROMIDE AND ALBUTEROL SULFATE .5; 2.5 MG/3ML; MG/3ML
3 SOLUTION RESPIRATORY (INHALATION) EVERY 6 HOURS
Refills: 0 | Status: DISCONTINUED | OUTPATIENT
Start: 2025-06-03 | End: 2025-06-11

## 2025-06-03 RX ORDER — MAGNESIUM, ALUMINUM HYDROXIDE 200-200 MG
30 TABLET,CHEWABLE ORAL EVERY 4 HOURS
Refills: 0 | Status: DISCONTINUED | OUTPATIENT
Start: 2025-06-03 | End: 2025-06-11

## 2025-06-03 RX ORDER — MELATONIN 5 MG
3 TABLET ORAL AT BEDTIME
Refills: 0 | Status: DISCONTINUED | OUTPATIENT
Start: 2025-06-03 | End: 2025-06-09

## 2025-06-03 RX ADMIN — Medication 0.5 MILLIGRAM(S): at 15:22

## 2025-06-03 RX ADMIN — Medication 1 MILLIGRAM(S): at 20:47

## 2025-06-03 RX ADMIN — Medication 650 MILLIGRAM(S): at 04:54

## 2025-06-03 RX ADMIN — IPRATROPIUM BROMIDE AND ALBUTEROL SULFATE 3 MILLILITER(S): .5; 2.5 SOLUTION RESPIRATORY (INHALATION) at 06:31

## 2025-06-03 RX ADMIN — Medication 1 MILLIGRAM(S): at 17:15

## 2025-06-03 RX ADMIN — Medication 0.5 MILLIGRAM(S): at 23:05

## 2025-06-03 RX ADMIN — Medication 0.5 MILLIGRAM(S): at 22:35

## 2025-06-03 RX ADMIN — Medication 650 MILLIGRAM(S): at 04:08

## 2025-06-03 RX ADMIN — Medication 25 GRAM(S): at 16:38

## 2025-06-03 RX ADMIN — Medication 1 MILLIGRAM(S): at 07:52

## 2025-06-03 RX ADMIN — Medication 1 MILLIGRAM(S): at 16:38

## 2025-06-03 RX ADMIN — Medication 1 MILLIGRAM(S): at 21:47

## 2025-06-03 RX ADMIN — Medication 0.5 MILLIGRAM(S): at 15:54

## 2025-06-03 RX ADMIN — Medication 1 MILLIGRAM(S): at 12:18

## 2025-06-03 RX ADMIN — Medication 3 MILLIGRAM(S): at 20:46

## 2025-06-03 RX ADMIN — Medication 200 GRAM(S): at 01:48

## 2025-06-03 RX ADMIN — Medication 250 MILLIGRAM(S): at 18:12

## 2025-06-03 RX ADMIN — Medication 25 GRAM(S): at 08:59

## 2025-06-03 RX ADMIN — ENOXAPARIN SODIUM 40 MILLIGRAM(S): 100 INJECTION SUBCUTANEOUS at 12:18

## 2025-06-03 RX ADMIN — IPRATROPIUM BROMIDE AND ALBUTEROL SULFATE 3 MILLILITER(S): .5; 2.5 SOLUTION RESPIRATORY (INHALATION) at 15:11

## 2025-06-03 RX ADMIN — Medication 1 MILLIGRAM(S): at 01:48

## 2025-06-03 RX ADMIN — IPRATROPIUM BROMIDE AND ALBUTEROL SULFATE 3 MILLILITER(S): .5; 2.5 SOLUTION RESPIRATORY (INHALATION) at 19:18

## 2025-06-03 NOTE — H&P ADULT - PROBLEM SELECTOR PLAN 3
Hypokalemia, K 3.3 on admission  - K repleted   - Trend lytes and replete as needed Patient reports diarrhea yesterday evening  - Follow up GI PCR

## 2025-06-03 NOTE — CONSULT NOTE ADULT - SUBJECTIVE AND OBJECTIVE BOX
** Full note to follow.  Revised regimen to oral dilaudid 2mg Q4H standing, IV 0.5mg Q4H PRN  Goal to transition to oral regimen and eliminate need for IV for smoother discharge process      Physical Medicine and Rehabilitation Initial Evaluation    Patients acute care records reviewed and are summarized as follows:     Patient is a 54y Female who is admitted to acute care for     Radiological studies reviewed, including    Medical studies/laboratory studies reviewed, includin-03-25 @ 14:15    137  |  106  |  20             --------------------------< 89     3.8  |  23  | 0.59    eGFR AA: --  eGFR N-AA: --    Calcium: 8.6  Phosphorus: --  Magnesium: --    AST: 8[L]    ALT: 14  AlkPhos: 57  Protein: 6.4  Albumin: 2.8[L]  TBili: 0.2  D-Bili: --  25 @ 05:53    --  |  --  |  --             --------------------------< --     --  |  --  | --    eGFR AA: --  eGFR N-AA: --    Calcium: --  Phosphorus: 3.4  Magnesium: 2.3    AST: --    ALT: --  AlkPhos: --  Protein: --  Albumin: --  TBili: --  D-Bili: --      The patient was seen and examined at bedside.    ROS:  Constitutional: Denies fevers or chills  Eyes: Denies blurry vision or double vision  Ears/Nose/Mouth/Throat: Denies any pain on swallowing or dry mouth or runny nose  CV: Denies chest pain or palpitations  Respiratory: Denies cough or dyspnea  GI: Denies nausea, vomiting, abdominal pain  : Denies urinary frequency or dysuria  MSK: Denies any myalgia or arthralgia  Neuro: Denies any headache or dizziness  Psychiatric: Denies depression or anxiety    PAST MEDICAL & SURGICAL HISTORY:  Asthma      Menorrhagia      Fibroids  uterine      ADHD (attention deficit hyperactivity disorder)      Obesity      Asthma      Perforated nasal septum      ADHD      S/P tubal ligation      S/P  section        S/P tonsillectomy      S/P endometrial ablation            Social, Family Hx: as above in HPI, Family history reviewed and found to be non pertinent to patients current disposition, denies history of alcohol, illicit drug use, tobacco use.    Medications:   acetaminophen     Tablet .. 650 milliGRAM(s) Oral every 6 hours PRN  albuterol    90 MICROgram(s) HFA Inhaler 2 Puff(s) Inhalation every 6 hours PRN  albuterol/ipratropium for Nebulization 3 milliLiter(s) Nebulizer every 6 hours  aluminum hydroxide/magnesium hydroxide/simethicone Suspension 30 milliLiter(s) Oral every 4 hours PRN  enoxaparin Injectable 40 milliGRAM(s) SubCutaneous every 24 hours  HYDROmorphone   Tablet 2 milliGRAM(s) Oral every 4 hours  HYDROmorphone  Injectable 0.5 milliGRAM(s) IV Push every 4 hours PRN  melatonin 3 milliGRAM(s) Oral at bedtime PRN  ondansetron Injectable 4 milliGRAM(s) IV Push every 8 hours PRN  piperacillin/tazobactam IVPB.. 3.375 Gram(s) IV Intermittent every 8 hours  vancomycin  IVPB 1000 milliGRAM(s) IV Intermittent every 12 hours      Physical Exam:   Vitals: T(C): 36.4 (25 @ 20:37), Max: 36.7 (25 @ 03:30)  HR: 79 (25 @ 01:09) (66 - 81)  BP: 122/72 (25 @ 20:37) (122/72 - 148/91)  RR: 18 (25 @ 20:37) (18 - 19)  SpO2: 95% (25 @ 01:09) (94% - 98%)    Constitutional: Gen: In no acute distress, cooperative with exam and questioning   Eyes: PERRL, no erythema of conjunctivae  Ears/Nose/Mouth/Throat: Mucous membranes moist, no thrush, no rhinorrhea  CV: Regular rate and rhythm, S1 S2, bilateral lower extremity pitting peripheral edema, pedal pulses intact  Resp: Good respiratory effort, Good air movement all lung fields  GI: Nontender, normoactive bowel sounds  Neuro: Cranial Nerves II-XII intact, sensation intact to light touch in peripheral upper and lower extremities  MSK: No cyanosis or clubbing of nails, strength 4-/5 in bilateral upper and lower extremities, ROM full in all extremities passively  Neck: No midline tenderness to palpation, supple  Skin: No rashes on limbs, normal temperature on palpation  Psychiatric: Awake alert fully oriented    A/P 54y year old Female with     Primary team notes are reviewed  Therapy notes are reviewed  Nursing notes reviewed    Laboratory studies reviewed including those mentioned earlier/above    Discussed management/coordinated care with primary team/referring provider.    High risk of morbidity from treatment with:      Physical Medicine and Rehabilitation Initial Evaluation    Patients acute care records reviewed and are summarized as follows:     Patient is a 54y Female who is admitted to acute care for diffuse facial pain, swelling, thick nasal discharge, admitted and being treated for sinusitis vs vasculitis vs osteomyelitis. Referred for pain control.     Medical studies/laboratory studies reviewed, includin-03-25 @ 14:15    137  |  106  |  20             --------------------------< 89     3.8  |  23  | 0.59    eGFR AA: --  eGFR N-AA: --    Calcium: 8.6  Phosphorus: --  Magnesium: --    AST: 8[L]    ALT: 14  AlkPhos: 57  Protein: 6.4  Albumin: 2.8[L]  TBili: 0.2  D-Bili: --  25 @ 05:53    --  |  --  |  --             --------------------------< --     --  |  --  | --    eGFR AA: --  eGFR N-AA: --    Calcium: --  Phosphorus: 3.4  Magnesium: 2.3    AST: --    ALT: --  AlkPhos: --  Protein: --  Albumin: --  TBili: --  D-Bili: --    The patient was seen and examined at bedside. Complains of facial pain improves with IV dilaudid.    ROS:  Constitutional: Denies fevers or chills  HEENT: Pain+    PAST MEDICAL & SURGICAL HISTORY:  Asthma  Menorrhagia  Fibroids  uterine  ADHD (attention deficit hyperactivity disorder)  Obesity  Asthma  Perforated nasal septum  ADHD  S/P tubal ligation  S/P  section    S/P tonsillectomy  S/P endometrial ablation    Medications: reviewed and revised    Physical Exam:   Vitals: T(C): 36.4 (25 @ 20:37), Max: 36.7 (25 @ 03:30)  HR: 79 (25 @ 01:09) (66 - 81)  BP: 122/72 (25 @ 20:37) (122/72 - 148/91)  RR: 18 (25 @ 20:37) (18 - 19)  SpO2: 95% (25 @ 01:09) (94% - 98%)    Constitutional: Gen: In no acute distress, cooperative with exam and questioning   HEENT: Swelling noted, warmth and erythema as well  Psychiatric: Awake alert fully oriented    A/P 54y year old Female with sinusitis vs vasculitis vs osteomyelitis, facial pain    Revised regimen to oral dilaudid 2mg Q4H standing, IV 0.5mg Q4H PRN  Goal to transition to oral regimen and eliminate need for IV for smoother discharge process    Primary team notes are reviewed  Laboratory studies reviewed including those mentioned earlier/above  Discussed management/coordinated care with primary team/referring provider.  High risk of morbidity from treatment with: schedule II narcotics

## 2025-06-03 NOTE — H&P ADULT - NSHPREVIEWOFSYSTEMS_GEN_ALL_CORE
CONSTITUTIONAL: denies fever, chills, fatigue, weakness  HEENT: + mucuous discharge, nasal swelling; denies blurred vision, sore throat  SKIN: denies new lesions, rash  CARDIOVASCULAR: denies chest pain, chest pressure, palpitations  RESPIRATORY: denies shortness of breath, sputum production  GASTROINTESTINAL: + denies diarrhea; denies nausea, vomiting, abdominal pain  GENITOURINARY: denies dysuria, discharge  NEUROLOGICAL: + headache;  denies numbness,  focal weakness  MUSCULOSKELETAL: denies new joint pain, muscle aches  HEMATOLOGIC: denies gross bleeding, bruising  LYMPHATICS: denies enlarged lymph nodes, extremity swelling  PSYCHIATRIC: denies recent changes in anxiety, depression  ENDOCRINOLOGIC: denies sweating, cold or heat intolerance

## 2025-06-03 NOTE — CONSULT NOTE ADULT - SUBJECTIVE AND OBJECTIVE BOX
Patient well know to out department but new to me. Treated previously by Dr Camacho and Dr Lott. Was thought to have a vasculitis and was seen by rheumatologist but lab eval was inconclusive.  Patient left the state to care for mother. Returned several weeks ago and started with facial swelling and pain 4 days ago. Came to hospital and ct scan obtained.  Started on IV antibiotics and steroids. Is actually looking and feeling much better at this time.  MRI  pending.  On exam erosion of skin left lower alar /. Intranasal large septal perforation with extensive crusting and scabbing.  Findings are consistent with Wegener's granulomatosis .

## 2025-06-03 NOTE — CHART NOTE - NSCHARTNOTEFT_GEN_A_CORE
Patient seen and examined at bedside. H&P from this morning reviewed. Patient c/o pain dilaudid every 6 hours is not helping, asking to get more frequently, has high tolerance. No diarrhea for two days. NO GI complaints at present. GI PCR d/c. Dilaudid 1mg IV Q4 hours PRN severe pain and 0.5mg for breakthrough Pain. Will check Istop. Pain Management consulted. Patient has been seeing an ENT in [place] and has follow up scheduled. Counseling and education provided in regards to pain management,  safe options discussed. ID to see patient Patient seen and examined at bedside. H&P from this morning reviewed. Patient c/o pain dilaudid every 6 hours is not helping, asking to get more frequently, has high tolerance. No diarrhea for two days. No GI complaints at present. GI PCR d/c. Dilaudid 1mg IV Q4 hours PRN severe pain and 0.5mg for breakthrough Pain. Will check Istop. Pain Management consulted. Patient has been seeing an ENT in [place] and has follow up scheduled.  As per ID recommendations ENT consult is recommended for possible debridement ? Sent message to Dr. Watson, awaiting reply. MR will be ordered in the meantime. Counseling and education provided in regards to pain management,  safe options discussed. ID to see patient

## 2025-06-03 NOTE — CONSULT NOTE ADULT - SUBJECTIVE AND OBJECTIVE BOX
[facility]  INFECTIOUS DISEASES   [address] [place] [address]  Tel: [phone]     Fax: [phone]  ========================================================  MD Doug Espinoza Kaushal, MD Cho, Michelle, MD Sunjit, Jaspal, MD  ========================================================    [id number]  SONU JUDD     CC: Patient is a 54y old  Female who presents with a chief complaint of Sinusitis, pain management (2025 00:33)    Interval HPI: Patient seen and examined at bedside. Completed 5 days of doxycycline. Patient endorses she has pain on both sides of her maxillary sinus but left hurts more. She also has trouble swallowing on her left side. Admits to requiring antibiotics and steroids (usually prednisone) during her flare-ups, but none have been this bad. Reviewed patient's CT results with patient; admits to cocaine use in her 20s (~5 years). Denies any fevers, sick contacts, recent travel, tick/bug bites. Denies other symptoms.     HPI:  53yo F with PMHx of asthma/COPD (no home O2), vasculitis presents to the ED with persistent severe diffuse facial pain, nasal edema, associated with thick mucous nasal discharge x1.5 week. Patient describes her facial pain as deep bone pain and soft tissue pain, stating she feels pain starts in her nose and radiates to her gums. Patient states she feels this is vasculitis flare-up and feels similar to her only prior flare-up 3 months ago. Patient has been using daily nasal saline as recommended. Most recently, she went to Urgent Care 5 days ago for the pain, where she was prescribed doxycycline 100mg BID (completed 5-days of doxy so far). Patient admits to trying to remove scabs in nares with this flare up. Patient reports dilaudid helped bring pain down from 10/10 to 6/10, but pain is creeping up again. She reports she has Hx of asthma and usually uses inhalers PRN not daily, but has been using them 2-3x a day over the past 1.5 wks since the flare-up started. Patient also states she feels like is having discomfort with swallowing, intermittent blurry vision associated with the flare-ups; denies blurry vision at this time. She endorses occasional focal rashes, palpitations in the past, but denies any rashes at this time. Endorses diarrhea yesterday evening; denies fever, chills, nausea, vomiting. Patient follows with Rheum, ENT for recently diagnosed vasculitis; currently, workup in progress with Rheum to determine which kind; she states that her last visit was 1-2 mo ago. Patient endorses FHx of autoimmune conditions; she states her daughter is currently undergoing workup for SLE.    Denies chest pain, SOB, cough, abdominal pain, nausea, vomiting, urinary frequency, urgency, dysuria, changes in vision, dizziness, numbness, tingling.    Denies recent international travel or sick contacts.    ED Course:   Vitals: BP: 129/85 , HR: 88 , Temp: 98.8, RR: 20 , SpO2: 94% on RA   Labs:   WBCs 9.59, H/H 11.4/34.1, BUN/Cr 17/0.67, Na 142, K 3.3  CT maxillofacial w/ IV cont:   1.  Chronic perforation of the nasal septum, with erosion of the left   inferior turbinate.  2.  Chronic erosive changes in the left hard palate, with improved   phlegmon in the soft palate.  3.  Correlate clinically to exclude chronic osteomyelitis.  4.  Consider maxillofacial MRI with gadolinium.    Received in the ED: Augmentin 875mg PO x1, Solumedrol 125mg IVP x1, dilaudid 1mg IVP x1, morphine 4mg IVP x1, Percocet 5mg-325mg PO x1, Mg 2g x1, KCl 40 mEq x1, Duonebs x1   (2025 00:33)      PAST MEDICAL & SURGICAL HISTORY:  Asthma      Menorrhagia      Fibroids  uterine      ADHD (attention deficit hyperactivity disorder)      Obesity      Asthma      Perforated nasal septum      ADHD      S/P tubal ligation      S/P  section        S/P tonsillectomy      S/P endometrial ablation            Social Hx: No current smoking, EtOH or drugs     FAMILY HISTORY:  FH: HTN (hypertension)  mother    Noncontributory     Allergies    sulfa drugs (Hives; Rash)  sulfa drugs (Hives; Urticaria)    Intolerances        MEDICATIONS  (STANDING):  albuterol/ipratropium for Nebulization 3 milliLiter(s) Nebulizer every 6 hours  enoxaparin Injectable 40 milliGRAM(s) SubCutaneous every 24 hours  piperacillin/tazobactam IVPB.. 3.375 Gram(s) IV Intermittent every 8 hours    MEDICATIONS  (PRN):  acetaminophen     Tablet .. 650 milliGRAM(s) Oral every 6 hours PRN Temp greater or equal to 38C (100.4F), Mild Pain (1 - 3)  albuterol    90 MICROgram(s) HFA Inhaler 2 Puff(s) Inhalation every 6 hours PRN Bronchospasm  aluminum hydroxide/magnesium hydroxide/simethicone Suspension 30 milliLiter(s) Oral every 4 hours PRN Dyspepsia  HYDROmorphone  Injectable 1 milliGRAM(s) IV Push every 4 hours PRN Severe Pain (7 - 10)  HYDROmorphone  Injectable 0.5 milliGRAM(s) IV Push every 6 hours PRN Severe Pain (7 - 10)  melatonin 3 milliGRAM(s) Oral at bedtime PRN Insomnia  ondansetron Injectable 4 milliGRAM(s) IV Push every 8 hours PRN Nausea and/or Vomiting       REVIEW OF SYSTEMS:  CONSTITUTIONAL:  No Fever or chills  HEENT:  No diplopia or blurred vision.  No sore throat or runny nose.  CARDIOVASCULAR:  No chest pain or SOB.  RESPIRATORY:  No cough, shortness of breath, PND or orthopnea.  GASTROINTESTINAL:  No nausea, vomiting or diarrhea.  GENITOURINARY:  No dysuria, frequency or urgency. No Blood in urine  MUSCULOSKELETAL:  no joint aches, no muscle pain  SKIN:  No change in skin, hair or nails.    Physical Exam:  Vital Signs Last 24 Hrs  T(C): 36.7 (2025 03:30), Max: 37.1 (2025 15:38)  T(F): 98 (2025 03:30), Max: 98.8 (2025 15:38)  HR: 71 (2025 03:30) (71 - 88)  BP: 146/86 (2025 03:30) (129/83 - 146/86)  BP(mean): --  RR: 18 (2025 03:30) (18 - 20)  SpO2: 96% (2025 03:30) (94% - 96%)    Parameters below as of 2025 03:30  Patient On (Oxygen Delivery Method): room air      Height (cm): 162.6 ( @ 15:38)  Weight (kg): 81.6 ( @ 15:38)  BMI (kg/m2): 30.9 ( @ 15:38)  BSA (m2): 1.87 ( @ 15:38)    GEN: NAD  HEENT: normocephalic and atraumatic. crusted brown scabs noted on b/l nostrils, L >R. Notable soft tissue swelling on left paratonsillar region with uvula midline (patient admits to tonsillectomy and adenectomy). Tender to palpation on maxillary sinuses L>R.   LUNGS: Clear to auscultation.  HEART: Regular rate and rhythm. S1S2  ABDOMEN: Soft, nontender, and nondistended.  Positive bowel sounds.    EXTREMITIES: Without edema.  NEUROLOGIC: grossly intact.  PSYCHIATRIC: Appropriate affect    Labs:      142  |  110[H]  |  17  ----------------------------<  112[H]  3.3[L]   |  26  |  0.67    Ca    8.8      2025 16:35  Phos  3.4       Mg     2.3     03    TPro  6.7  /  Alb  2.8[L]  /  TBili  0.2  /  DBili  x   /  AST  4[L]  /  ALT  15  /  AlkPhos  63                            11.4   9.59  )-----------( 350      ( 2025 16:35 )             34.1       Urinalysis Basic - ( 2025 16:35 )    Color: x / Appearance: x / SG: x / pH: x  Gluc: 112 mg/dL / Ketone: x  / Bili: x / Urobili: x   Blood: x / Protein: x / Nitrite: x   Leuk Esterase: x / RBC: x / WBC x   Sq Epi: x / Non Sq Epi: x / Bacteria: x      LIVER FUNCTIONS - ( 2025 16:35 )  Alb: 2.8 g/dL / Pro: 6.7 g/dL / ALK PHOS: 63 U/L / ALT: 15 U/L / AST: 4 U/L / GGT: x                           RECENT CULTURES:        All imaging and other data have been reviewed.     [facility]  INFECTIOUS DISEASES   [address] [place] [address]  Tel: [phone]     Fax: [phone]  ========================================================  MD Doug Espinoza Kaushal, MD Cho, Michelle, MD Sunjit, Jaspal, MD  ========================================================    [id number]  SONU JUDD     CC: Patient is a 54y old  Female who presents with a chief complaint of Sinusitis, pain management (2025 00:33)    Interval HPI: Patient seen and examined at bedside. Completed 5 days of doxycycline. Patient endorses she has pain on both sides of her maxillary sinus but left hurts more. She also has trouble swallowing on her left side. Admits to requiring antibiotics and steroids (usually prednisone) during her flare-ups, but none have been this bad. Reviewed patient's CT results with patient; admits to cocaine use in her 20s (~5 years). Denies any fevers, sick contacts, recent travel, tick/bug bites. Denies other symptoms.     HPI:  55yo F with PMHx of asthma/COPD (no home O2), vasculitis presents to the ED with persistent severe diffuse facial pain, nasal edema, associated with thick mucous nasal discharge x1.5 week. Patient describes her facial pain as deep bone pain and soft tissue pain, stating she feels pain starts in her nose and radiates to her gums. Patient states she feels this is vasculitis flare-up and feels similar to her only prior flare-up 3 months ago. Patient has been using daily nasal saline as recommended. Most recently, she went to Urgent Care 5 days ago for the pain, where she was prescribed doxycycline 100mg BID (completed 5-days of doxy so far). Patient admits to trying to remove scabs in nares with this flare up. Patient reports dilaudid helped bring pain down from 10/10 to 6/10, but pain is creeping up again. She reports she has Hx of asthma and usually uses inhalers PRN not daily, but has been using them 2-3x a day over the past 1.5 wks since the flare-up started. Patient also states she feels like is having discomfort with swallowing, intermittent blurry vision associated with the flare-ups; denies blurry vision at this time. She endorses occasional focal rashes, palpitations in the past, but denies any rashes at this time. Endorses diarrhea yesterday evening; denies fever, chills, nausea, vomiting. Patient follows with Rheum, ENT for recently diagnosed vasculitis; currently, workup in progress with Rheum to determine which kind; she states that her last visit was 1-2 mo ago. Patient endorses FHx of autoimmune conditions; she states her daughter is currently undergoing workup for SLE.    Denies chest pain, SOB, cough, abdominal pain, nausea, vomiting, urinary frequency, urgency, dysuria, changes in vision, dizziness, numbness, tingling.    Denies recent international travel or sick contacts.    ED Course:   Vitals: BP: 129/85 , HR: 88 , Temp: 98.8, RR: 20 , SpO2: 94% on RA   Labs:   WBCs 9.59, H/H 11.4/34.1, BUN/Cr 17/0.67, Na 142, K 3.3  CT maxillofacial w/ IV cont:   1.  Chronic perforation of the nasal septum, with erosion of the left   inferior turbinate.  2.  Chronic erosive changes in the left hard palate, with improved   phlegmon in the soft palate.  3.  Correlate clinically to exclude chronic osteomyelitis.  4.  Consider maxillofacial MRI with gadolinium.    Received in the ED: Augmentin 875mg PO x1, Solumedrol 125mg IVP x1, dilaudid 1mg IVP x1, morphine 4mg IVP x1, Percocet 5mg-325mg PO x1, Mg 2g x1, KCl 40 mEq x1, Duonebs x1   (2025 00:33)      PAST MEDICAL & SURGICAL HISTORY:  Asthma      Menorrhagia      Fibroids  uterine      ADHD (attention deficit hyperactivity disorder)      Obesity      Asthma      Perforated nasal septum      ADHD      S/P tubal ligation      S/P  section        S/P tonsillectomy      S/P endometrial ablation            Social Hx: No current smoking, EtOH or drugs     FAMILY HISTORY:  FH: HTN (hypertension)  mother    Noncontributory     Allergies    sulfa drugs (Hives; Rash)  sulfa drugs (Hives; Urticaria)    Intolerances        MEDICATIONS  (STANDING):  albuterol/ipratropium for Nebulization 3 milliLiter(s) Nebulizer every 6 hours  enoxaparin Injectable 40 milliGRAM(s) SubCutaneous every 24 hours  piperacillin/tazobactam IVPB.. 3.375 Gram(s) IV Intermittent every 8 hours    MEDICATIONS  (PRN):  acetaminophen     Tablet .. 650 milliGRAM(s) Oral every 6 hours PRN Temp greater or equal to 38C (100.4F), Mild Pain (1 - 3)  albuterol    90 MICROgram(s) HFA Inhaler 2 Puff(s) Inhalation every 6 hours PRN Bronchospasm  aluminum hydroxide/magnesium hydroxide/simethicone Suspension 30 milliLiter(s) Oral every 4 hours PRN Dyspepsia  HYDROmorphone  Injectable 1 milliGRAM(s) IV Push every 4 hours PRN Severe Pain (7 - 10)  HYDROmorphone  Injectable 0.5 milliGRAM(s) IV Push every 6 hours PRN Severe Pain (7 - 10)  melatonin 3 milliGRAM(s) Oral at bedtime PRN Insomnia  ondansetron Injectable 4 milliGRAM(s) IV Push every 8 hours PRN Nausea and/or Vomiting       REVIEW OF SYSTEMS: As per HPI    Physical Exam:  Vital Signs Last 24 Hrs  T(C): 36.7 (2025 03:30), Max: 37.1 (2025 15:38)  T(F): 98 (2025 03:30), Max: 98.8 (2025 15:38)  HR: 71 (2025 03:30) (71 - 88)  BP: 146/86 (2025 03:30) (129/83 - 146/86)  BP(mean): --  RR: 18 (2025 03:30) (18 - 20)  SpO2: 96% (2025 03:30) (94% - 96%)    Parameters below as of 2025 03:30  Patient On (Oxygen Delivery Method): room air      Height (cm): 162.6 (- @ 15:38)  Weight (kg): 81.6 ( @ 15:38)  BMI (kg/m2): 30.9 ( @ 15:38)  BSA (m2): 1.87 ( @ 15:38)    GEN: NAD  HEENT: normocephalic and atraumatic. crusted brown scabs noted on b/l nostrils, L >R. Notable soft tissue swelling on left paratonsillar region with uvula midline (patient admits to tonsillectomy and adenectomy). Tender to palpation on maxillary sinuses L>R.   LUNGS: Clear to auscultation.  HEART: Regular rate and rhythm. S1S2  ABDOMEN: Soft, nontender, and nondistended.  Positive bowel sounds.    EXTREMITIES: Without edema.  NEUROLOGIC: grossly intact.  PSYCHIATRIC: Appropriate affect    Labs:      142  |  110[H]  |  17  ----------------------------<  112[H]  3.3[L]   |  26  |  0.67    Ca    8.8      2025 16:35  Phos  3.4     06-  Mg     2.3     06-03    TPro  6.7  /  Alb  2.8[L]  /  TBili  0.2  /  DBili  x   /  AST  4[L]  /  ALT  15  /  AlkPhos  63                            11.4   9.59  )-----------( 350      ( 2025 16:35 )             34.1       Urinalysis Basic - ( 2025 16:35 )    Color: x / Appearance: x / SG: x / pH: x  Gluc: 112 mg/dL / Ketone: x  / Bili: x / Urobili: x   Blood: x / Protein: x / Nitrite: x   Leuk Esterase: x / RBC: x / WBC x   Sq Epi: x / Non Sq Epi: x / Bacteria: x      LIVER FUNCTIONS - ( 2025 16:35 )  Alb: 2.8 g/dL / Pro: 6.7 g/dL / ALK PHOS: 63 U/L / ALT: 15 U/L / AST: 4 U/L / GGT: x                           RECENT CULTURES:        All imaging and other data have been reviewed.     [facility]  INFECTIOUS DISEASES   [address] [place] [address]  Tel: [phone]     Fax: [phone]  ========================================================  MD Doug Espinoza Kaushal, MD Cho, Michelle, MD Sunjit, Jaspal, MD  ========================================================    [id number]  SONU JUDD     CC: Patient is a 54y old  Female who presents with a chief complaint of Sinusitis, pain management (2025 00:33)    Interval HPI: Patient seen and examined at bedside. Completed 5 days of doxycycline. Patient endorses she has pain on both sides of her maxillary sinus but left hurts more. She also has trouble swallowing on her left side. Admits to requiring antibiotics and steroids (usually prednisone) during her flare-ups, but none have been this bad. Reviewed patient's CT results with patient; admits to cocaine use in her 20s (~5 years). Denies any fevers, sick contacts, recent travel, tick/bug bites. Denies other symptoms.     HPI:  55yo F with PMHx of asthma/COPD (no home O2), vasculitis presents to the ED with persistent severe diffuse facial pain, nasal edema, associated with thick mucous nasal discharge x1.5 week. Patient describes her facial pain as deep bone pain and soft tissue pain, stating she feels pain starts in her nose and radiates to her gums. Patient states she feels this is vasculitis flare-up and feels similar to her only prior flare-up 3 months ago. Patient has been using daily nasal saline as recommended. Most recently, she went to Urgent Care 5 days ago for the pain, where she was prescribed doxycycline 100mg BID (completed 5-days of doxy so far). Patient admits to trying to remove scabs in nares with this flare up. Patient reports dilaudid helped bring pain down from 10/10 to 6/10, but pain is creeping up again. She reports she has Hx of asthma and usually uses inhalers PRN not daily, but has been using them 2-3x a day over the past 1.5 wks since the flare-up started. Patient also states she feels like is having discomfort with swallowing, intermittent blurry vision associated with the flare-ups; denies blurry vision at this time. She endorses occasional focal rashes, palpitations in the past, but denies any rashes at this time. Endorses diarrhea yesterday evening; denies fever, chills, nausea, vomiting. Patient follows with Rheum, ENT for recently diagnosed vasculitis; currently, workup in progress with Rheum to determine which kind; she states that her last visit was 1-2 mo ago. Patient endorses FHx of autoimmune conditions; she states her daughter is currently undergoing workup for SLE.    Denies chest pain, SOB, cough, abdominal pain, nausea, vomiting, urinary frequency, urgency, dysuria, changes in vision, dizziness, numbness, tingling.    Denies recent international travel or sick contacts.    ED Course:   Vitals: BP: 129/85 , HR: 88 , Temp: 98.8, RR: 20 , SpO2: 94% on RA   Labs:   WBCs 9.59, H/H 11.4/34.1, BUN/Cr 17/0.67, Na 142, K 3.3  CT maxillofacial w/ IV cont:   1.  Chronic perforation of the nasal septum, with erosion of the left   inferior turbinate.  2.  Chronic erosive changes in the left hard palate, with improved   phlegmon in the soft palate.  3.  Correlate clinically to exclude chronic osteomyelitis.  4.  Consider maxillofacial MRI with gadolinium.    Received in the ED: Augmentin 875mg PO x1, Solumedrol 125mg IVP x1, dilaudid 1mg IVP x1, morphine 4mg IVP x1, Percocet 5mg-325mg PO x1, Mg 2g x1, KCl 40 mEq x1, Duonebs x1   (2025 00:33)      PAST MEDICAL & SURGICAL HISTORY:  Asthma      Menorrhagia      Fibroids  uterine      ADHD (attention deficit hyperactivity disorder)      Obesity      Asthma      Perforated nasal septum      ADHD      S/P tubal ligation      S/P  section        S/P tonsillectomy      S/P endometrial ablation            Social Hx: No current smoking, EtOH or drugs     FAMILY HISTORY:  FH: HTN (hypertension)  mother    Noncontributory     Allergies    sulfa drugs (Hives; Rash)  sulfa drugs (Hives; Urticaria)    Intolerances        MEDICATIONS  (STANDING):  albuterol/ipratropium for Nebulization 3 milliLiter(s) Nebulizer every 6 hours  enoxaparin Injectable 40 milliGRAM(s) SubCutaneous every 24 hours  piperacillin/tazobactam IVPB.. 3.375 Gram(s) IV Intermittent every 8 hours    MEDICATIONS  (PRN):  acetaminophen     Tablet .. 650 milliGRAM(s) Oral every 6 hours PRN Temp greater or equal to 38C (100.4F), Mild Pain (1 - 3)  albuterol    90 MICROgram(s) HFA Inhaler 2 Puff(s) Inhalation every 6 hours PRN Bronchospasm  aluminum hydroxide/magnesium hydroxide/simethicone Suspension 30 milliLiter(s) Oral every 4 hours PRN Dyspepsia  HYDROmorphone  Injectable 1 milliGRAM(s) IV Push every 4 hours PRN Severe Pain (7 - 10)  HYDROmorphone  Injectable 0.5 milliGRAM(s) IV Push every 6 hours PRN Severe Pain (7 - 10)  melatonin 3 milliGRAM(s) Oral at bedtime PRN Insomnia  ondansetron Injectable 4 milliGRAM(s) IV Push every 8 hours PRN Nausea and/or Vomiting       REVIEW OF SYSTEMS: As per HPI    Physical Exam:  Vital Signs Last 24 Hrs  T(C): 36.7 (2025 03:30), Max: 37.1 (2025 15:38)  T(F): 98 (2025 03:30), Max: 98.8 (2025 15:38)  HR: 71 (2025 03:30) (71 - 88)  BP: 146/86 (2025 03:30) (129/83 - 146/86)  BP(mean): --  RR: 18 (2025 03:30) (18 - 20)  SpO2: 96% (2025 03:30) (94% - 96%)    Parameters below as of 2025 03:30  Patient On (Oxygen Delivery Method): room air      Height (cm): 162.6 ( @ 15:38)  Weight (kg): 81.6 ( @ 15:38)  BMI (kg/m2): 30.9 ( @ 15:38)  BSA (m2): 1.87 ( @ 15:38)    GEN: NAD  HEENT: normocephalic and atraumatic. crusted brown scabs noted on b/l nostrils, L >R. Notable soft tissue swelling on left paratonsillar region with uvula midline (patient admits to tonsillectomy and adenectomy). Tender to palpation on maxillary sinuses L>R.   LUNGS: Clear to auscultation.  HEART: Regular rate and rhythm. S1S2  ABDOMEN: Soft, nontender, and nondistended.  Positive bowel sounds.    EXTREMITIES: Without edema.  NEUROLOGIC: grossly intact.  PSYCHIATRIC: Appropriate affect    Labs:      142  |  110[H]  |  17  ----------------------------<  112[H]  3.3[L]   |  26  |  0.67    Ca    8.8      2025 16:35  Phos  3.4     06  Mg     2.3     06-    TPro  6.7  /  Alb  2.8[L]  /  TBili  0.2  /  DBili  x   /  AST  4[L]  /  ALT  15  /  AlkPhos  63                            11.4   9.59  )-----------( 350      ( 2025 16:35 )             34.1       Urinalysis Basic - ( 2025 16:35 )    Color: x / Appearance: x / SG: x / pH: x  Gluc: 112 mg/dL / Ketone: x  / Bili: x / Urobili: x   Blood: x / Protein: x / Nitrite: x   Leuk Esterase: x / RBC: x / WBC x   Sq Epi: x / Non Sq Epi: x / Bacteria: x      LIVER FUNCTIONS - ( 2025 16:35 )  Alb: 2.8 g/dL / Pro: 6.7 g/dL / ALK PHOS: 63 U/L / ALT: 15 U/L / AST: 4 U/L / GGT: x                           RECENT CULTURES:        All imaging and other data have been reviewed.    < from: CT Maxillofacial w/ IV Cont (25 @ 18:04) >  FINDINGS:    Again noted is a chronic perforation ofthe nasal septum, with erosion vs   postsurgical change in the left inferior turbinate. There is phlegmonous   inflammation in the soft palate measuring 5.7 mm in thickness, improved   from 7 mm previously on remeasurement. There are stable chronic erosions   in the left hard palate.    The pattern is nonspecific and could be seen in the setting of prior   cocaine use, prior surgery, and Wegener's granulomatosis. Clinical   correlation will be required to exclude superimposed chronic   osteomyelitis or active inflammation. Further characterization with a   maxillofacial MRI with gadolinium could be of value, if the situation is   not clear clinically. Consider ENT consultation.    There are reactive lymph nodes in the neck without suspicious features,   similar to the prior exam. There are no new drainable fluid collections   in the soft tissues. There is no subcutaneous gas.    The facial bones are otherwise negative, without evidence of a fracture.   There is no TMJ dislocation. The maxillary, sphenoid, mandible, zygoma,   and nasal bones are negative.  The visualized portions of the frontal and   temporal bones are unremarkable. The walls of the orbits and maxillary   sinuses are intact. The visualized soft tissues of the face and upper   neck are otherwise negative. No incidental lytic or sclerotic lesions.    The globes, lenses, optic nerves, optic sheaths, orbital fat, extraocular   muscles, optic chiasm, suprasellar cistern, sella, and lacrimal glands   are negative.    IMPRESSION:    1.  Chronic perforation of the nasal septum, with erosion of the left   inferior turbinate.  2.  Chronic erosive changes in the left hard palate, with improved   phlegmon in the soft palate.  3.  Correlate clinically to exclude chronic osteomyelitis.  4.  Consider maxillofacial MRI with gadolinium.        < end of copied text >

## 2025-06-03 NOTE — H&P ADULT - NSICDXPASTMEDICALHX_GEN_ALL_CORE_FT
PAST MEDICAL HISTORY:  ADHD     ADHD (attention deficit hyperactivity disorder)     Asthma     Asthma     Fibroids uterine    Menorrhagia     Obesity     Perforated nasal septum

## 2025-06-03 NOTE — H&P ADULT - HISTORY OF PRESENT ILLNESS
53yo F with PMHx of ____ presents to the ED with     Denies fever, chills, chest pain, palpitations, SOB, cough, abdominal pain, nausea, vomiting, diarrhea, constipation, urinary frequency, urgency, or dysuria, headaches, changes in vision, dizziness, numbness, tingling.  Denies recent travel, recent antibiotic use, or sick contacts.    ED Course:   Vitals: BP: 129/85 , HR: 88 , Temp: 98.8, RR: 20 , SpO2: 94% on RA   Labs:   WBCs 9.59, H/H 11.4/34.1, BUN/Cr 17/0.67, Na 142, K 3.3  CT maxillofacial w/ IV cont:   1.  Chronic perforation of the nasal septum, with erosion of the left   inferior turbinate.  2.  Chronic erosive changes in the left hard palate, with improved   phlegmon in the soft palate.  3.  Correlate clinically to exclude chronic osteomyelitis.  4.  Consider maxillofacial MRI with gadolinium.    Received in the ED: Augmentin 875mg PO x1, Solumedrol 125mg IVP x1, dilaudid 1mg IVP x1, morphine 4mg IVP x1, Percocet 5mg-325mg PO x1, Mg 2g x1, KCl 40 mEq x1, Duonebs x1   53yo F with PMHx of asthma/COPD (no home O2), vasculitis presents to the ED with persistent severe diffuse facial pain, nasal edema, associated with thick mucous nasal discharge x1.5 week. Patient describes her facial pain as deep bone pain and soft tissue pain, stating she feels pain starts in her nose and radiates to her gums. Patient states she feels this is vasculitis flare-up and feels similar to her only prior flare-up 3 months ago. Patient has been using daily nasal saline as recommended. Most recently, she went to Urgent Care 5 days ago for the pain, where she was prescribed doxycycline 100mg BID (completed 5-days of doxy so far). Patient admits to trying to remove scabs in nares with this flare up. Patient reports dilaudid helped bring pain down from 10/10 to 6/10, but pain is creeping up again. She reports she has Hx of asthma and usually uses inhalers PRN not daily, but has been using them 2-3x a day over the past 1.5 wks since the flare-up started. Patient also states she feels like is having discomfort with swallowing, intermittent blurry vision associated with the flare-ups; denies blurry vision at this time. She endorses occasional focal rashes, palpitations in the past, but denies any rashes at this time. Endorses diarrhea yesterday evening; denies fever, chills, nausea, vomiting. Patient follows with Rheum, ENT for recently diagnosed vasculitis; currently, workup in progress with Rheum to determine which kind; she states that her last visit was 1-2 mo ago. Patient endorses FHx of autoimmune conditions; she states her daughter is currently undergoing workup for SLE.    Denies chest pain, SOB, cough, abdominal pain, nausea, vomiting, urinary frequency, urgency, dysuria, changes in vision, dizziness, numbness, tingling.    Denies recent international travel or sick contacts.    ED Course:   Vitals: BP: 129/85 , HR: 88 , Temp: 98.8, RR: 20 , SpO2: 94% on RA   Labs:   WBCs 9.59, H/H 11.4/34.1, BUN/Cr 17/0.67, Na 142, K 3.3  CT maxillofacial w/ IV cont:   1.  Chronic perforation of the nasal septum, with erosion of the left   inferior turbinate.  2.  Chronic erosive changes in the left hard palate, with improved   phlegmon in the soft palate.  3.  Correlate clinically to exclude chronic osteomyelitis.  4.  Consider maxillofacial MRI with gadolinium.    Received in the ED: Augmentin 875mg PO x1, Solumedrol 125mg IVP x1, dilaudid 1mg IVP x1, morphine 4mg IVP x1, Percocet 5mg-325mg PO x1, Mg 2g x1, KCl 40 mEq x1, Duonebs x1

## 2025-06-03 NOTE — H&P ADULT - PROBLEM SELECTOR PLAN 2
Patient follows with Rheum, ENT for recently diagnosed vasculitis; currently, workup in progress with Rheum to determine which kind  - Continue outpatient follow up with Rheum, ENT  - Rest of plan as above

## 2025-06-03 NOTE — CARE COORDINATION ASSESSMENT. - NSPASTMEDSURGHISTORY_GEN_ALL_CORE_FT
PAST MEDICAL & SURGICAL HISTORY:  ADHD (attention deficit hyperactivity disorder)      Fibroids  uterine      Menorrhagia      Asthma      S/P tonsillectomy      S/P  section        S/P tubal ligation      S/P endometrial ablation  2013      Obesity      ADHD      Perforated nasal septum      Asthma

## 2025-06-03 NOTE — H&P ADULT - ATTENDING COMMENTS
54 year old female with a PMH of Asthma/COPD, vasculitis, chronic sinusitis, presents to the ED to be evaluated for about 2 weeks history of diffused facial pain, nasal edema associated with thick/mucoid nasal discharge, pain has been more severe with some difficulty breathing which triggered ED visit. labs unremarkable, CT- remarkable for Chronic sinusitis with osteo  In the ED transfer initiated, but was not warranted, no acute intervention is needed. To be followed outpatient with ENT & appointment is scheduled for 6/20.   Patient is being admitted for pain control & antibiotics and ID consult.   Plan as per resident's note above.

## 2025-06-03 NOTE — H&P ADULT - ASSESSMENT
53yo F with PMHx of asthma/COPD (no home O2), vasculitis presents to the ED with persistent severe diffuse facial pain, nasal edema, associated with thick mucous nasal discharge x1.5 week. Admitted for sinusitis, pain management.

## 2025-06-03 NOTE — CARE COORDINATION ASSESSMENT. - NSCAREPROVIDERS_GEN_ALL_CORE_FT
CARE PROVIDERS:  Accepting Physician: Fabian Stevens  Administration: Kylah Hernandez  Administration: Noah Nelson  Administration: Pita Aguilar  Admitting: Doctor, Slade  Attending: Yara Lundberg  Consultant: Matilda Parsons  Consultant: Stephanie Adkins  Covering Team: Niurka Parada  ED Attending: Pratik Vieyra  ED Nurse: Sharon Irwin  Emergency Medicine: Grace Cortés  Nurse: Sharon Irwin  Nurse: Georgi Broderick  Nurse: Allyson Osullivan  Nurse: Phuong López  Override: Allyson Osullivan  Primary Team: Yara Lundberg  Primary Team: Fabain Stevens  Primary Team: Stephanie Mesa  Primary Team: Jacque Szymanski  : Bebe Vanessa// Supp. Assoc.: Chantell Dillard

## 2025-06-03 NOTE — H&P ADULT - NSHPSOCIALHISTORY_GEN_ALL_CORE
Tobacco: 0.5 ppd for 40 yrs; current smoker  EtOH: occasionally, 1x a month  Recreational drug use: smokes marijuana daily   Lives: alone at home  Ambulates: independently   ADLs: independent

## 2025-06-03 NOTE — CONSULT NOTE ADULT - ASSESSMENT
----incomplete----    Assessment and Plan: 55yo F with PMHx of asthma/COPD (no home O2), vasculitis presents to the ED with persistent severe diffuse facial pain, nasal edema, associated with thick mucous nasal discharge x1.5 week. Admitted for sinusitis, pain management.       Recommendations:  - CT maxillofacial showing chronic changes, concern for chronic osteomyelitis-- past cocaine use in her 20s (~5 years)  - Differentials include sinusitis vs vasculitis vs osteomyelitis  - No leukocytosis, patient afebrile on admission  - Given Augmentin 875mg PO x1, Solumedrol 125mg IVP x1, dilaudid 1mg IVP x1, morphine 4mg IVP x1, Percocet 5mg-325mg PO x1 in the ED  - Continue Zosyn q8hr   - f/u ESR, CRP, MRSA PCR  - ENT outpatient f/u 06/20         Thank you for courtesy of this consult.     Will follow.       ----incomplete----    Assessment and Plan: 53yo F with PMHx of asthma/COPD (no home O2), vasculitis presents to the ED with persistent severe diffuse facial pain, nasal edema, associated with thick mucous nasal discharge x1.5 week. Admitted for sinusitis, pain management.       Recommendations:  - CT maxillofacial showing chronic changes, concern for chronic osteomyelitis-- past cocaine use in her 20s (~5 years)  - Differentials include sinusitis vs vasculitis vs osteomyelitis  - No leukocytosis, patient afebrile on admission  - Given Augmentin 875mg PO x1, Solumedrol 125mg IVP x1, dilaudid 1mg IVP x1, morphine 4mg IVP x1, Percocet 5mg-325mg PO x1 in the ED  - f/u ESR, CRP, MRSA PCR  - Continue Zosyn q8hr; considering Vanc for MRSA coverage   - ENT outpatient f/u 06/20, but would recommend ENT inpatient to evaluate and obtain tissue cultures and MRI          Thank you for courtesy of this consult.     Will follow.       Assessment and Plan: 55yo F with PMHx of asthma/COPD (no home O2), vasculitis presents to the ED with persistent severe diffuse facial pain, nasal edema, associated with thick mucous nasal discharge x1.5 week. Admitted for sinusitis, pain management.       Recommendations:  - CT maxillofacial showing chronic changes, concern for chronic osteomyelitis-- past cocaine use in her 20s (~5 years)  - Differentials include sinusitis vs vasculitis vs osteomyelitis  - No leukocytosis, patient afebrile on admission  - Given Augmentin 875mg PO x1, Solumedrol 125mg IVP x1, dilaudid 1mg IVP x1, morphine 4mg IVP x1, Percocet 5mg-325mg PO x1 in the ED  - f/u ESR, CRP, MRSA PCR  - Continue Zosyn q8hr; considering Vanc for MRSA coverage   - ENT outpatient f/u 06/20, but would recommend ENT inpatient to evaluate and obtain tissue cultures and MRI          Thank you for courtesy of this consult.     Will follow.

## 2025-06-03 NOTE — ED ADULT NURSE REASSESSMENT NOTE - NSFALLUNIVINTERV_ED_ALL_ED
Bed/Stretcher in lowest position, wheels locked, appropriate side rails in place/Call bell, personal items and telephone in reach/Instruct patient to call for assistance before getting out of bed/chair/stretcher/Non-slip footwear applied when patient is off stretcher/[place] to call system/Physically safe environment - no spills, clutter or unnecessary equipment/Purposeful proactive rounding/Room/bathroom lighting operational, light cord in reach

## 2025-06-03 NOTE — ED ADULT NURSE REASSESSMENT NOTE - NS ED NURSE REASSESS COMMENT FT1
report received from previous rn. received patient resting in stretcher. endorses she feels shaky and anxious from being on prednisone. MD made aware, no anxiolytics to be given at this time since she has been receiving Dilaudid for pain as per resident.   has some nasal pain, to be medicated for pain as ordered. warm blankets and pillow provided. care ongoing.

## 2025-06-03 NOTE — H&P ADULT - NSVTERISKASSESS_GEN_ALL_CORE FT
Medical Assessment Completed on: 03-Jun-2025 01:48 Doxepin Counseling:  Patient advised that the medication is sedating and not to drive a car after taking this medication. Patient informed of potential adverse effects including but not limited to dry mouth, urinary retention, and blurry vision.  The patient verbalized understanding of the proper use and possible adverse effects of doxepin.  All of the patient's questions and concerns were addressed.

## 2025-06-03 NOTE — H&P ADULT - PROBLEM SELECTOR PLAN 6
DVT ppx: Encourage ambulation  - SCDs Chronic. On home albuterol inhaler, ipratropium/albuterol nebs PRN  - Continue home meds

## 2025-06-03 NOTE — CARE COORDINATION ASSESSMENT. - OTHER PERTINENT REFERRAL INFORMATION
Sw met with pt at bedside. Pt is A & O x4 and able to make her needs known. Sw introduced self and role and pt expressed verbal understanding. Pt lives in a pvt home with no stairs. Pt is indep with her ADLS and has no hx of HC, DME or LITO. Pt currently smokes 0.5 ppd for the last 40 years. Pt is currently working and has no additional help at home. pt also drinks 1 x month and smoke marijuana daily. PCP: Catalina Gutierrez [phone] Pharm: [facility] # [id number].

## 2025-06-03 NOTE — H&P ADULT - PROBLEM SELECTOR PLAN 5
RN called patient to offer work in apt with Kristin (CHF)     NO answer.     (*when patient calls back- please offer work in apt with kristin this week- please keep apt with Dr. Norton on 10/24/23: There are multiples apt times this week with kristin)      PSR: please call and offer patient a follow up apt with kristin: if you are connected with patient please warm transfer to RN!! Thank you     Chronic. On home albuterol, ipratropium/albuterol PRN  - Duonebs, albuterol PRN Hgb 11.4 on admission. Baseline around 11.6  - Likely anemia of chronic dz 2/2 vasculitis   - No signs or symptoms of acute bleeding  - Follow up AM iron panel  - Trend H/H

## 2025-06-03 NOTE — CONSULT NOTE ADULT - ATTENDING COMMENTS
53yo F with PMHx of asthma/COPD (no home O2), vasculitis, who presents with worsening facial pain and swelling. Symptoms started about 5 months ago with non healing cut on L nares. She said she went to urgent care and diagnosed with MRSA infection. She saw ENT (Dr. Kris Monsalve) 2 months and Rheum (Dr. Luis Manuel Posadas, [facility]) and was diagnosed with vasculitis. She said she had similar episode about 2 months ago and was treated for vasculitis "flare" with oral antibiotics and prednisone.    Would continue treatment for sinusitis, but suggest further workup given concern for underlying chronic process. CT showed chronic perforation of the nasal septum, with erosion of the left inferior turbinate, and chronic erosive changes in the left hard palate, with improved phlegmon in the soft palate. Will need MRI to evaluate for osteomyelitis, but also suggest ENT evaluation (? needs tissue diagnosis). Otherwise no fever here and no leukocytosis.    #Sinusitis  #Erosive changes on left hard palate  #Chronic perforation of nasal septum  #? Osteomyelitis  #Hx of MRSA infection    -continue Zosyn  -suggest vancomycin pending MRSA nasal PCR and blood cultures  -suggest blood cultures, CRP  -suggest serum Fungitell and galactomannan  -consider maxillofacial MRI  -suggest ENT evaluation    Thank you for courtesy of this consult.     Will follow.  Discussed with the primary team.     Stephanie Adkins MD  Division of Infectious Diseases   Cell [phone] between 8am and 6pm   After 6pm and weekends please call ID service at [phone].     75 minutes spent on total encounter assessing patient, examination, chart review, counseling and coordinating care by the attending physician/nurse/care manager.

## 2025-06-03 NOTE — PATIENT PROFILE ADULT - NSPROREFERSVCHOMEBH_GEN_A_NUR
[FreeTextEntry1] : Ms. ADRIAN SAMUELS 73 year F arrives  today for evaluation of their Aortic Stenosis. Symptoms include SOB which has recently improved.  NYHA class II. All questions and concerns were addressed with the patient. Pre-op planning was discussed with the patient. \par \par Plan:\par Has mod AS but mod to severe MR. Needs eval w DERRICK  and will Fu in clinic\par F/U with PMD for routine medical care\par F/U with Cardiologist Dr. David\par \par I, Christi Ventura [facility], am acting as scribe for  no

## 2025-06-03 NOTE — CONSULT NOTE ADULT - ASSESSMENT
chronic sinusitis, rhinitis septal perforation with skin involvement consistent with vascular pathology or Wegeners

## 2025-06-03 NOTE — H&P ADULT - NSHPPHYSICALEXAM_GEN_ALL_CORE
T(C): 36.7 (06-02-25 @ 21:42), Max: 37.1 (06-02-25 @ 15:38)  HR: 76 (06-02-25 @ 21:42) (76 - 88)  BP: 129/83 (06-02-25 @ 21:42) (129/83 - 129/85)  RR: 18 (06-02-25 @ 21:42) (18 - 20)  SpO2: 96% (06-02-25 @ 21:42) (94% - 96%)    GENERAL: patient appears in mild discomfort, appropriate, pleasant  EYES: sclera clear, no exudates, EOMI, PERRLA  ENMT: + mucous and scabbing noted in bilateral nares L>R with associated erythema, edema; + maxillary TTP; oropharynx clear without erythema, no exudates, moist mucous membranes  NECK: supple, soft, no thyromegaly noted  LUNGS: clear to auscultation, symmetric breath sounds, no wheezing or rhonchi appreciated  HEART:  S1/S2 present , regular rate and rhythm, no murmurs noted, no lower extremity edema  GASTROINTESTINAL: abdomen is soft, + mild epigastric TTP, nondistended, normoactive bowel sounds  INTEGUMENT: good skin turgor, no lesions or rashes  noted  MUSCULOSKELETAL: no clubbing or cyanosis, no obvious deformity  NEUROLOGIC: awake, alert, oriented x3, good muscle tone in 4 extremities, no obvious motor or sensory deficits  PSYCHIATRIC: mood is good, affect is congruent, linear and logical thought process  HEME/LYMPH: no palpable supraclavicular nodules, no obvious ecchymosis or petechiae

## 2025-06-03 NOTE — H&P ADULT - PROBLEM SELECTOR PLAN 4
Hgb 11.4 on admission. Baseline around 11.6  - Likely anemia of chronic dz 2/2 vasculitis   - No signs or symptoms of acute bleeding  - Trend H/H Hypokalemia, K 3.3 on admission  - K repleted   - Trend lytes and replete as needed

## 2025-06-03 NOTE — CONSULT NOTE ADULT - PROBLEM SELECTOR RECOMMENDATION 9
needs antibiotics and steroids for acute treatment and then auto immune therapy for vasculitis.  Will discuss tomorrow pending MRI with her previous ENT physicians

## 2025-06-03 NOTE — H&P ADULT - PROBLEM SELECTOR PLAN 1
Patient presenting with persistent severe diffuse facial pain, nasal edema, associated with thick mucous nasal discharge x1.5 week, similar to prior vasculitis flare-up 3 mo ago  - No leukocytosis, patient afebrile on admission  - CT maxillofacial showing chronic changes, cannot exclude chronic osteomyelitis - discussed with patient; recommend outpatient follow up with ENT for further workup, imaging  - Given Augmentin 875mg PO x1, Solumedrol 125mg IVP x1, dilaudid 1mg IVP x1, morphine 4mg IVP x1, Percocet 5mg-325mg PO x1 in the ED  - Start Zosyn q8hr   - f/u AM CBC, CMP, ESR, CRP   - Pain management: tylenol PO PRN mild pain, dilaudid 1mg IV q6hr PRN mod/severe pain Patient presenting with persistent severe diffuse facial pain, nasal edema, associated with thick mucous nasal discharge x1.5 week, similar to prior vasculitis flare-up 3 mo ago  - No leukocytosis, patient afebrile on admission  - CT maxillofacial showing chronic changes, concern for chronic osteomyelitis - discussed with patient; recommend outpatient follow up with ENT for further workup, imaging  - Given Augmentin 875mg PO x1, Solumedrol 125mg IVP x1, dilaudid 1mg IVP x1, morphine 4mg IVP x1, Percocet 5mg-325mg PO x1 in the ED  - Start Zosyn q8hr   - f/u AM CBC, CMP, ESR, CRP, MRSA PCR  - Trend CBC w/ diff, fever curve   - ID (Dr. Buck) consulted, follow up recs  - Pain management: tylenol PO PRN mild pain, dilaudid 1mg IV q6hr PRN mod/severe pain

## 2025-06-04 LAB
A1C WITH ESTIMATED AVERAGE GLUCOSE RESULT: 5.8 % — HIGH (ref 4–5.6)
ANION GAP SERPL CALC-SCNC: 8 MMOL/L — SIGNIFICANT CHANGE UP (ref 5–17)
BUN SERPL-MCNC: 17 MG/DL — SIGNIFICANT CHANGE UP (ref 7–23)
CALCIUM SERPL-MCNC: 8.3 MG/DL — LOW (ref 8.5–10.1)
CHLORIDE SERPL-SCNC: 108 MMOL/L — SIGNIFICANT CHANGE UP (ref 96–108)
CO2 SERPL-SCNC: 25 MMOL/L — SIGNIFICANT CHANGE UP (ref 22–31)
CREAT SERPL-MCNC: 0.54 MG/DL — SIGNIFICANT CHANGE UP (ref 0.5–1.3)
CRP SERPL-MCNC: 8 MG/L — HIGH
EGFR: 111 ML/MIN/1.73M2 — SIGNIFICANT CHANGE UP
EGFR: 111 ML/MIN/1.73M2 — SIGNIFICANT CHANGE UP
ERYTHROCYTE [SEDIMENTATION RATE] IN BLOOD: 25 MM/HR — HIGH (ref 0–20)
ESTIMATED AVERAGE GLUCOSE: 120 MG/DL — HIGH (ref 68–114)
GLUCOSE SERPL-MCNC: 155 MG/DL — HIGH (ref 70–99)
HCT VFR BLD CALC: 30.5 % — LOW (ref 34.5–45)
HGB BLD-MCNC: 9.8 G/DL — LOW (ref 11.5–15.5)
MCHC RBC-ENTMCNC: 29.3 PG — SIGNIFICANT CHANGE UP (ref 27–34)
MCHC RBC-ENTMCNC: 32.1 G/DL — SIGNIFICANT CHANGE UP (ref 32–36)
MCV RBC AUTO: 91 FL — SIGNIFICANT CHANGE UP (ref 80–100)
MRSA PCR RESULT.: DETECTED
NRBC BLD AUTO-RTO: 0 /100 WBCS — SIGNIFICANT CHANGE UP (ref 0–0)
PLATELET # BLD AUTO: 308 K/UL — SIGNIFICANT CHANGE UP (ref 150–400)
POTASSIUM SERPL-MCNC: 3.1 MMOL/L — LOW (ref 3.5–5.3)
POTASSIUM SERPL-SCNC: 3.1 MMOL/L — LOW (ref 3.5–5.3)
RBC # BLD: 3.35 M/UL — LOW (ref 3.8–5.2)
RBC # FLD: 13.2 % — SIGNIFICANT CHANGE UP (ref 10.3–14.5)
S AUREUS DNA NOSE QL NAA+PROBE: DETECTED
SODIUM SERPL-SCNC: 141 MMOL/L — SIGNIFICANT CHANGE UP (ref 135–145)
VANCOMYCIN TROUGH SERPL-MCNC: 10.4 UG/ML — SIGNIFICANT CHANGE UP (ref 10–20)
WBC # BLD: 9.71 K/UL — SIGNIFICANT CHANGE UP (ref 3.8–10.5)
WBC # FLD AUTO: 9.71 K/UL — SIGNIFICANT CHANGE UP (ref 3.8–10.5)

## 2025-06-04 PROCEDURE — 99232 SBSQ HOSP IP/OBS MODERATE 35: CPT

## 2025-06-04 PROCEDURE — 70543 MRI ORBT/FAC/NCK W/O &W/DYE: CPT | Mod: 26

## 2025-06-04 PROCEDURE — 99233 SBSQ HOSP IP/OBS HIGH 50: CPT

## 2025-06-04 RX ORDER — CEFTRIAXONE 500 MG/1
1000 INJECTION, POWDER, FOR SOLUTION INTRAMUSCULAR; INTRAVENOUS EVERY 24 HOURS
Refills: 0 | Status: COMPLETED | OUTPATIENT
Start: 2025-06-04 | End: 2025-06-10

## 2025-06-04 RX ORDER — TRAZODONE HCL 100 MG
150 TABLET ORAL AT BEDTIME
Refills: 0 | Status: DISCONTINUED | OUTPATIENT
Start: 2025-06-04 | End: 2025-06-11

## 2025-06-04 RX ORDER — HYDROMORPHONE/SOD CHLOR,ISO/PF 2 MG/10 ML
0.5 SYRINGE (ML) INJECTION ONCE
Refills: 0 | Status: DISCONTINUED | OUTPATIENT
Start: 2025-06-04 | End: 2025-06-04

## 2025-06-04 RX ORDER — METHYLPREDNISOLONE ACETATE 80 MG/ML
40 INJECTION, SUSPENSION INTRA-ARTICULAR; INTRALESIONAL; INTRAMUSCULAR; SOFT TISSUE DAILY
Refills: 0 | Status: DISCONTINUED | OUTPATIENT
Start: 2025-06-04 | End: 2025-06-08

## 2025-06-04 RX ORDER — HYDROMORPHONE/SOD CHLOR,ISO/PF 2 MG/10 ML
0.5 SYRINGE (ML) INJECTION EVERY 4 HOURS
Refills: 0 | Status: DISCONTINUED | OUTPATIENT
Start: 2025-06-04 | End: 2025-06-10

## 2025-06-04 RX ORDER — HYDROMORPHONE/SOD CHLOR,ISO/PF 2 MG/10 ML
2 SYRINGE (ML) INJECTION EVERY 4 HOURS
Refills: 0 | Status: DISCONTINUED | OUTPATIENT
Start: 2025-06-04 | End: 2025-06-10

## 2025-06-04 RX ADMIN — Medication 0.5 MILLIGRAM(S): at 18:58

## 2025-06-04 RX ADMIN — IPRATROPIUM BROMIDE AND ALBUTEROL SULFATE 3 MILLILITER(S): .5; 2.5 SOLUTION RESPIRATORY (INHALATION) at 19:34

## 2025-06-04 RX ADMIN — Medication 40 MILLIEQUIVALENT(S): at 14:38

## 2025-06-04 RX ADMIN — Medication 2 MILLIGRAM(S): at 05:12

## 2025-06-04 RX ADMIN — IPRATROPIUM BROMIDE AND ALBUTEROL SULFATE 3 MILLILITER(S): .5; 2.5 SOLUTION RESPIRATORY (INHALATION) at 00:42

## 2025-06-04 RX ADMIN — Medication 0.5 MILLIGRAM(S): at 07:19

## 2025-06-04 RX ADMIN — Medication 2 MILLIGRAM(S): at 17:53

## 2025-06-04 RX ADMIN — ENOXAPARIN SODIUM 40 MILLIGRAM(S): 100 INJECTION SUBCUTANEOUS at 11:36

## 2025-06-04 RX ADMIN — Medication 1 MILLIGRAM(S): at 01:20

## 2025-06-04 RX ADMIN — Medication 0.5 MILLIGRAM(S): at 12:22

## 2025-06-04 RX ADMIN — IPRATROPIUM BROMIDE AND ALBUTEROL SULFATE 3 MILLILITER(S): .5; 2.5 SOLUTION RESPIRATORY (INHALATION) at 13:02

## 2025-06-04 RX ADMIN — Medication 150 MILLIGRAM(S): at 21:48

## 2025-06-04 RX ADMIN — Medication 25 GRAM(S): at 09:55

## 2025-06-04 RX ADMIN — Medication 2 MILLIGRAM(S): at 09:54

## 2025-06-04 RX ADMIN — Medication 25 GRAM(S): at 01:19

## 2025-06-04 RX ADMIN — Medication 0.5 MILLIGRAM(S): at 21:48

## 2025-06-04 RX ADMIN — Medication 250 MILLIGRAM(S): at 05:12

## 2025-06-04 RX ADMIN — Medication 0.5 MILLIGRAM(S): at 18:43

## 2025-06-04 RX ADMIN — Medication 0.5 MILLIGRAM(S): at 20:41

## 2025-06-04 RX ADMIN — Medication 2 MILLIGRAM(S): at 21:48

## 2025-06-04 RX ADMIN — Medication 2 MILLIGRAM(S): at 05:42

## 2025-06-04 RX ADMIN — Medication 250 MILLIGRAM(S): at 17:54

## 2025-06-04 RX ADMIN — Medication 40 MILLIEQUIVALENT(S): at 17:58

## 2025-06-04 RX ADMIN — Medication 0.5 MILLIGRAM(S): at 11:35

## 2025-06-04 RX ADMIN — Medication 2 MILLIGRAM(S): at 14:03

## 2025-06-04 RX ADMIN — METHYLPREDNISOLONE ACETATE 40 MILLIGRAM(S): 80 INJECTION, SUSPENSION INTRA-ARTICULAR; INTRALESIONAL; INTRAMUSCULAR; SOFT TISSUE at 11:35

## 2025-06-04 RX ADMIN — Medication 0.5 MILLIGRAM(S): at 16:00

## 2025-06-04 RX ADMIN — Medication 0.5 MILLIGRAM(S): at 15:40

## 2025-06-04 RX ADMIN — CEFTRIAXONE 100 MILLIGRAM(S): 500 INJECTION, POWDER, FOR SOLUTION INTRAMUSCULAR; INTRAVENOUS at 15:40

## 2025-06-04 RX ADMIN — IPRATROPIUM BROMIDE AND ALBUTEROL SULFATE 3 MILLILITER(S): .5; 2.5 SOLUTION RESPIRATORY (INHALATION) at 07:17

## 2025-06-04 RX ADMIN — Medication 2 MILLIGRAM(S): at 22:47

## 2025-06-04 NOTE — SBIRT NOTE ADULT - NSSBIRTDRGNOACTINTDET_GEN_A_CORE
Pt reports hx of cocaine use. No use in several years. Pt denies current triggers/ concerns. SW to follow and remain available for any needs. Pt reports hx of cocaine use. No use in several years. Pt denies current triggers/ concerns. Pt reports daily marijuana use for pain, she denies concerns at this time. SW to follow and remain available for any needs.

## 2025-06-04 NOTE — PROGRESS NOTE ADULT - ASSESSMENT
53yo F with PMHx of asthma/COPD (no home O2), vasculitis, who presents with worsening facial pain and swelling. Symptoms started about 5 months ago with non healing cut on L nares. She said she went to urgent care and diagnosed with MRSA infection. She saw ENT (Dr. Kris Monsalve) 2 months and Rheum (Dr. Luis Manuel Posadas, [facility]) and was diagnosed with vasculitis. She said she had similar episode about 2 months ago and was treated for vasculitis "flare" with oral antibiotics and prednisone.    Patient being treated for sinusitis, but concern for underlying chronic process. Seen by ENT and concern  for chronic sinusitis, rhinitis septal perforation of vascular pathology or Wegeners. ? if any superimposed infection. MRI showed Left hard palate defects/erosions with mild surrounding marrow edema and enhancement, per report could represent osteomyelitis or reactive osteitis. No abscess seen. MRSA nasal PCR positive. No fevers and CRP 10.    #Sinusitis  #Erosive changes on left hard palate  #Chronic perforation of nasal septum  #? Osteomyelitis  #MRSA nasal colonization    -continue vancomycin, AUC based dosing per pharmacy  -will switch Zosyn to cefepime  -blood cultures peding  -serum Fungitell and galactomannan pending  -appreciate ENT input    Stephanie Adkins MD  Division of Infectious Diseases   Cell [phone] between 8am and 6pm   After 6pm and weekends please call ID service at [phone].     35 minutes spent on total encounter assessing patient, examination, chart review, counseling and coordinating care by the attending physician/nurse/care manager.        53yo F with PMHx of asthma/COPD (no home O2), vasculitis, who presents with worsening facial pain and swelling. Symptoms started about 5 months ago with non healing cut on L nares. She said she went to urgent care and diagnosed with MRSA infection. She saw ENT (Dr. Kris Monsalve) 2 months and Rheum (Dr. Luis Manuel Posadas, [facility]) and was diagnosed with vasculitis. She said she had similar episode about 2 months ago and was treated for vasculitis "flare" with oral antibiotics and prednisone.    Patient being treated for sinusitis, but concern for underlying chronic process. Seen by ENT and concern  for chronic sinusitis, rhinitis septal perforation of vascular pathology or Wegeners. ? if any superimposed infection, although CRP low at 10. MRI showed Left hard palate defects/erosions with mild surrounding marrow edema and enhancement, per report could represent osteomyelitis or reactive osteitis. No abscess seen. MRSA nasal PCR positive. No fevers and no leukocytosis.    #Sinusitis  #Erosive changes on left hard palate  #Chronic perforation of nasal septum  #? Osteomyelitis  #MRSA nasal colonization    -continue vancomycin, AUC based dosing per pharmacy  -will switch Zosyn to ceftriaxone  -blood cultures peding  -serum Fungitell and galactomannan pending  -appreciate ENT input    Stephanie Adkins MD  Division of Infectious Diseases   Cell [phone] between 8am and 6pm   After 6pm and weekends please call ID service at [phone].     35 minutes spent on total encounter assessing patient, examination, chart review, counseling and coordinating care by the attending physician/nurse/care manager.

## 2025-06-04 NOTE — PROGRESS NOTE ADULT - SUBJECTIVE AND OBJECTIVE BOX
Patient is a 54y old  Female who presents with a chief complaint of Sinusitis, pain management (2025 14:51)      FROM ADMISSION H+P:   HPI:  55yo F with PMHx of asthma/COPD (no home O2), vasculitis presents to the ED with persistent severe diffuse facial pain, nasal edema, associated with thick mucous nasal discharge x1.5 week. Patient describes her facial pain as deep bone pain and soft tissue pain, stating she feels pain starts in her nose and radiates to her gums. Patient states she feels this is vasculitis flare-up and feels similar to her only prior flare-up 3 months ago. Patient has been using daily nasal saline as recommended. Most recently, she went to Urgent Care 5 days ago for the pain, where she was prescribed doxycycline 100mg BID (completed 5-days of doxy so far). Patient admits to trying to remove scabs in nares with this flare up. Patient reports dilaudid helped bring pain down from 10/10 to 6/10, but pain is creeping up again. She reports she has Hx of asthma and usually uses inhalers PRN not daily, but has been using them 2-3x a day over the past 1.5 wks since the flare-up started. Patient also states she feels like is having discomfort with swallowing, intermittent blurry vision associated with the flare-ups; denies blurry vision at this time. She endorses occasional focal rashes, palpitations in the past, but denies any rashes at this time. Endorses diarrhea yesterday evening; denies fever, chills, nausea, vomiting. Patient follows with Rheum, ENT for recently diagnosed vasculitis; currently, workup in progress with Rheum to determine which kind; she states that her last visit was 1-2 mo ago. Patient endorses FHx of autoimmune conditions; she states her daughter is currently undergoing workup for SLE.    Denies chest pain, SOB, cough, abdominal pain, nausea, vomiting, urinary frequency, urgency, dysuria, changes in vision, dizziness, numbness, tingling.    Denies recent international travel or sick contacts.    ED Course:   Vitals: BP: 129/85 , HR: 88 , Temp: 98.8, RR: 20 , SpO2: 94% on RA   Labs:   WBCs 9.59, H/H 11.4/34.1, BUN/Cr 17/0.67, Na 142, K 3.3  CT maxillofacial w/ IV cont:   1.  Chronic perforation of the nasal septum, with erosion of the left   inferior turbinate.  2.  Chronic erosive changes in the left hard palate, with improved   phlegmon in the soft palate.  3.  Correlate clinically to exclude chronic osteomyelitis.  4.  Consider maxillofacial MRI with gadolinium.    Received in the ED: Augmentin 875mg PO x1, Solumedrol 125mg IVP x1, dilaudid 1mg IVP x1, morphine 4mg IVP x1, Percocet 5mg-325mg PO x1, Mg 2g x1, KCl 40 mEq x1, Duonebs x1   (2025 00:33)      INTERVAL HPI/OVERNIGHT EVENTS: Patient was seen and examined. No acute events occurred overnight. No new complaints.    I&O's Summary    2025 07:01  -  2025 18:52  --------------------------------------------------------  IN: 1375 mL / OUT: 0 mL / NET: 1375 mL        PAST MEDICAL & SURGICAL HISTORY:  Asthma      Menorrhagia      Fibroids  uterine      ADHD (attention deficit hyperactivity disorder)      Obesity      Asthma      Perforated nasal septum      ADHD      S/P tubal ligation      S/P  section  /      S/P tonsillectomy      S/P endometrial ablation            LABS:                        9.8    9.71  )-----------( 308      ( 2025 10:30 )             30.5     -    141  |  108  |  17  ----------------------------<  155[H]  3.1[L]   |  25  |  0.54    Ca    8.3[L]      2025 10:30  Phos  3.4     06-03  Mg     2.3     06-03    TPro  6.4  /  Alb  2.8[L]  /  TBili  0.2  /  DBili  x   /  AST  8[L]  /  ALT  14  /  AlkPhos  57  06-03      Urinalysis Basic - ( 2025 10:30 )    Color: x / Appearance: x / SG: x / pH: x  Gluc: 155 mg/dL / Ketone: x  / Bili: x / Urobili: x   Blood: x / Protein: x / Nitrite: x   Leuk Esterase: x / RBC: x / WBC x   Sq Epi: x / Non Sq Epi: x / Bacteria: x      CAPILLARY BLOOD GLUCOSE            Urinalysis Basic - ( 2025 10:30 )    Color: x / Appearance: x / SG: x / pH: x  Gluc: 155 mg/dL / Ketone: x  / Bili: x / Urobili: x   Blood: x / Protein: x / Nitrite: x   Leuk Esterase: x / RBC: x / WBC x   Sq Epi: x / Non Sq Epi: x / Bacteria: x        MEDICATIONS  (STANDING):  albuterol/ipratropium for Nebulization 3 milliLiter(s) Nebulizer every 6 hours  cefTRIAXone   IVPB 1000 milliGRAM(s) IV Intermittent every 24 hours  enoxaparin Injectable 40 milliGRAM(s) SubCutaneous every 24 hours  HYDROmorphone   Tablet 2 milliGRAM(s) Oral every 4 hours  methylPREDNISolone sodium succinate Injectable 40 milliGRAM(s) IV Push daily  traZODone 150 milliGRAM(s) Oral at bedtime  vancomycin  IVPB 1000 milliGRAM(s) IV Intermittent every 12 hours    MEDICATIONS  (PRN):  acetaminophen     Tablet .. 650 milliGRAM(s) Oral every 6 hours PRN Temp greater or equal to 38C (100.4F), Mild Pain (1 - 3)  albuterol    90 MICROgram(s) HFA Inhaler 2 Puff(s) Inhalation every 6 hours PRN Bronchospasm  aluminum hydroxide/magnesium hydroxide/simethicone Suspension 30 milliLiter(s) Oral every 4 hours PRN Dyspepsia  HYDROmorphone  Injectable 0.5 milliGRAM(s) IV Push every 4 hours PRN Severe Pain (7 - 10)  melatonin 3 milliGRAM(s) Oral at bedtime PRN Insomnia  ondansetron Injectable 4 milliGRAM(s) IV Push every 8 hours PRN Nausea and/or Vomiting      REVIEW OF SYSTEMS:  CONSTITUTIONAL: No fever or chills  HEENT:  No headache, no sore throat  RESPIRATORY: No cough, wheezing, or shortness of breath  CARDIOVASCULAR: No chest pain, palpitations  GASTROINTESTINAL: No abdominal pain, nausea, vomiting, or diarrhea  GENITOURINARY: No dysuria, frequency, or hematuria  NEUROLOGICAL: no focal weakness or dizziness  MUSCULOSKELETAL: no myalgias  PSYCH: no recent changes in mood    RADIOLOGY & ADDITIONAL TESTS:    Imaging Personally Reviewed:  [x] YES  [ ] NO    Consultant(s) Notes Reviewed:  [x] YES  [ ] NO    PHYSICAL EXAM:  T(C): 37.1 (25 @ 12:13), Max: 37.1 (25 @ 12:13)  HR: 71 (25 @ 13:14) (61 - 80)  BP: 125/74 (25 @ 12:13) (122/72 - 145/79)  RR: 19 (25 @ 12:13) (18 - 19)  SpO2: 97% (25 @ 13:14) (94% - 97%)    GENERAL: NAD, well-developed, well-groomed  HEENT:  anicteric, moist mucous membranes  CHEST/LUNG:  CTA b/l, no rales, wheezes, or rhonchi  HEART:  RRR, S1, S2  ABDOMEN:  BS+, soft, nontender, nondistended  EXTREMITIES: no edema, cyanosis, or calf tenderness  NERVOUS SYSTEM: answers questions and follows commands appropriately  PSYCH: normal affect    Care Discussed with Consultants/Other Providers [x] YES  [ ] NO Patient is a 54y old  Female who presents with a chief complaint of Sinusitis, pain management (2025 14:51)      FROM ADMISSION H+P:   HPI:  55yo F with PMHx of asthma/COPD (no home O2), vasculitis presents to the ED with persistent severe diffuse facial pain, nasal edema, associated with thick mucous nasal discharge x1.5 week. Patient describes her facial pain as deep bone pain and soft tissue pain, stating she feels pain starts in her nose and radiates to her gums. Patient states she feels this is vasculitis flare-up and feels similar to her only prior flare-up 3 months ago. Patient has been using daily nasal saline as recommended. Most recently, she went to Urgent Care 5 days ago for the pain, where she was prescribed doxycycline 100mg BID (completed 5-days of doxy so far). Patient admits to trying to remove scabs in nares with this flare up. Patient reports dilaudid helped bring pain down from 10/10 to 6/10, but pain is creeping up again. She reports she has Hx of asthma and usually uses inhalers PRN not daily, but has been using them 2-3x a day over the past 1.5 wks since the flare-up started. Patient also states she feels like is having discomfort with swallowing, intermittent blurry vision associated with the flare-ups; denies blurry vision at this time. She endorses occasional focal rashes, palpitations in the past, but denies any rashes at this time. Endorses diarrhea yesterday evening; denies fever, chills, nausea, vomiting. Patient follows with Rheum, ENT for recently diagnosed vasculitis; currently, workup in progress with Rheum to determine which kind; she states that her last visit was 1-2 mo ago. Patient endorses FHx of autoimmune conditions; she states her daughter is currently undergoing workup for SLE.    Denies chest pain, SOB, cough, abdominal pain, nausea, vomiting, urinary frequency, urgency, dysuria, changes in vision, dizziness, numbness, tingling.    Denies recent international travel or sick contacts.    ED Course:   Vitals: BP: 129/85 , HR: 88 , Temp: 98.8, RR: 20 , SpO2: 94% on RA   Labs:   WBCs 9.59, H/H 11.4/34.1, BUN/Cr 17/0.67, Na 142, K 3.3  CT maxillofacial w/ IV cont:   1.  Chronic perforation of the nasal septum, with erosion of the left   inferior turbinate.  2.  Chronic erosive changes in the left hard palate, with improved   phlegmon in the soft palate.  3.  Correlate clinically to exclude chronic osteomyelitis.  4.  Consider maxillofacial MRI with gadolinium.    Received in the ED: Augmentin 875mg PO x1, Solumedrol 125mg IVP x1, dilaudid 1mg IVP x1, morphine 4mg IVP x1, Percocet 5mg-325mg PO x1, Mg 2g x1, KCl 40 mEq x1, Duonebs x1   (2025 00:33)      INTERVAL HPI/OVERNIGHT EVENTS: Patient was seen and examined. No acute events occurred overnight. No new complaints. Significant pain with even talking. Numbness of teeth. Reports that she followed with rheum as instructed by ENT - questionable if she has vasculitis or not - states it was not clear as she had some positive work up. Pain can be debilitating but is currently managed with ATC and PRN dilaudid.    I&O's Summary    2025 07:01  -  2025 18:52  --------------------------------------------------------  IN: 1375 mL / OUT: 0 mL / NET: 1375 mL        PAST MEDICAL & SURGICAL HISTORY:  Asthma      Menorrhagia      Fibroids  uterine      ADHD (attention deficit hyperactivity disorder)      Obesity      Asthma      Perforated nasal septum      ADHD      S/P tubal ligation      S/P  section        S/P tonsillectomy      S/P endometrial ablation            LABS:                        9.8    9.71  )-----------( 308      ( 2025 10:30 )             30.5     06-04    141  |  108  |  17  ----------------------------<  155[H]  3.1[L]   |  25  |  0.54    Ca    8.3[L]      2025 10:30  Phos  3.4     06-03  Mg     2.3     06-03    TPro  6.4  /  Alb  2.8[L]  /  TBili  0.2  /  DBili  x   /  AST  8[L]  /  ALT  14  /  AlkPhos  57  06-03      Urinalysis Basic - ( 2025 10:30 )    Color: x / Appearance: x / SG: x / pH: x  Gluc: 155 mg/dL / Ketone: x  / Bili: x / Urobili: x   Blood: x / Protein: x / Nitrite: x   Leuk Esterase: x / RBC: x / WBC x   Sq Epi: x / Non Sq Epi: x / Bacteria: x      CAPILLARY BLOOD GLUCOSE            Urinalysis Basic - ( 2025 10:30 )    Color: x / Appearance: x / SG: x / pH: x  Gluc: 155 mg/dL / Ketone: x  / Bili: x / Urobili: x   Blood: x / Protein: x / Nitrite: x   Leuk Esterase: x / RBC: x / WBC x   Sq Epi: x / Non Sq Epi: x / Bacteria: x        MEDICATIONS  (STANDING):  albuterol/ipratropium for Nebulization 3 milliLiter(s) Nebulizer every 6 hours  cefTRIAXone   IVPB 1000 milliGRAM(s) IV Intermittent every 24 hours  enoxaparin Injectable 40 milliGRAM(s) SubCutaneous every 24 hours  HYDROmorphone   Tablet 2 milliGRAM(s) Oral every 4 hours  methylPREDNISolone sodium succinate Injectable 40 milliGRAM(s) IV Push daily  traZODone 150 milliGRAM(s) Oral at bedtime  vancomycin  IVPB 1000 milliGRAM(s) IV Intermittent every 12 hours    MEDICATIONS  (PRN):  acetaminophen     Tablet .. 650 milliGRAM(s) Oral every 6 hours PRN Temp greater or equal to 38C (100.4F), Mild Pain (1 - 3)  albuterol    90 MICROgram(s) HFA Inhaler 2 Puff(s) Inhalation every 6 hours PRN Bronchospasm  aluminum hydroxide/magnesium hydroxide/simethicone Suspension 30 milliLiter(s) Oral every 4 hours PRN Dyspepsia  HYDROmorphone  Injectable 0.5 milliGRAM(s) IV Push every 4 hours PRN Severe Pain (7 - 10)  melatonin 3 milliGRAM(s) Oral at bedtime PRN Insomnia  ondansetron Injectable 4 milliGRAM(s) IV Push every 8 hours PRN Nausea and/or Vomiting      REVIEW OF SYSTEMS:  CONSTITUTIONAL: No fever or chills  HEENT:  +nasal pain, teeth numbness  RESPIRATORY: No cough, wheezing, or shortness of breath  CARDIOVASCULAR: No chest pain, palpitations  GASTROINTESTINAL: No abdominal pain, nausea, vomiting, or diarrhea  GENITOURINARY: No dysuria, frequency, or hematuria  NEUROLOGICAL: no focal weakness or dizziness  MUSCULOSKELETAL: no myalgias  PSYCH: no recent changes in mood    RADIOLOGY & ADDITIONAL TESTS:    Imaging Personally Reviewed:  [x] YES  [ ] NO    Consultant(s) Notes Reviewed:  [x] YES  [ ] NO    PHYSICAL EXAM:  T(C): 37.1 (25 @ 12:13), Max: 37.1 (25 @ 12:13)  HR: 71 (25 @ 13:14) (61 - 80)  BP: 125/74 (25 @ 12:13) (122/72 - 145/79)  RR: 19 (25 @ 12:13) (18 - 19)  SpO2: 97% (25 @ 13:14) (94% - 97%)    GENERAL: NAD,  HEENT:  anicteric, moist mucous membranes  CHEST/LUNG:  CTA b/l, no rales, wheezes, or rhonchi  HEART:  RRR, S1, S2  ABDOMEN:  BS+, soft, nontender, nondistended  EXTREMITIES: no edema, cyanosis, or calf tenderness  NERVOUS SYSTEM: answers questions and follows commands appropriately  PSYCH: normal affect    Care Discussed with Consultants/Other Providers [x] YES  [ ] NO

## 2025-06-04 NOTE — PROGRESS NOTE ADULT - PROBLEM SELECTOR PLAN 1
Patient presenting with persistent severe diffuse facial pain, nasal edema, associated with thick mucous nasal discharge x1.5 week, similar to prior vasculitis flare-up 3 mo ago  - No leukocytosis, patient afebrile on admission  - CT maxillofacial showing chronic changes, concern for chronic osteomyelitis - discussed with patient; recommend outpatient follow up with ENT for further workup, imaging  - Continue vancomycin + rocephin  - f/u AM CBC, CMP, ESR, CRP, MRSA PCR +  - Trend CBC w/ diff, fever curve   - ID (Dr. Adkins) consulted, follow up recs  - Pain management with ATC and PRN dilaudid as per pain mgmt  - Will start IV steroids - MRI reviewed and d/w ENT consultant  - D/w Dr. Watson - states steroids + iv abx. no role for biopsy at present to check labwork first for wegeners and to follow with ENT on discharge

## 2025-06-04 NOTE — SOCIAL WORK PROGRESS NOTE - NSSWPROGRESSNOTE_GEN_ALL_CORE
SW consult received for transportation concerns. Pt states that she shares car with her daughter. Pt does report that she has used Medicaid transportation services in the past but doesn't have contact information. SW provided contact information if she needs transport to medical appointments. Pt expresses understanding. SW to follow and remain available for any needs.

## 2025-06-04 NOTE — PROGRESS NOTE ADULT - PROBLEM SELECTOR PLAN 5
Hgb 11.4 on admission. Baseline around 11.6  - Likely anemia of chronic dz 2/2 vasculitis   - No signs or symptoms of acute bleeding  - Follow up AM iron panel  - Trend H/H

## 2025-06-04 NOTE — PROGRESS NOTE ADULT - SUBJECTIVE AND OBJECTIVE BOX
[facility]  INFECTIOUS DISEASES - [address], [place] [address]  Tel: [phone]     Fax: [phone]  =======================================================    SONU JUDD [id number]    Follow up: No fevers. Still with facial pain but more localized around the nose and less extensive.    Allergies:  sulfa drugs (Hives; Rash)  sulfa drugs (Hives; Urticaria)      Antibiotics:  acetaminophen     Tablet .. 650 milliGRAM(s) Oral every 6 hours PRN  albuterol    90 MICROgram(s) HFA Inhaler 2 Puff(s) Inhalation every 6 hours PRN  albuterol/ipratropium for Nebulization 3 milliLiter(s) Nebulizer every 6 hours  aluminum hydroxide/magnesium hydroxide/simethicone Suspension 30 milliLiter(s) Oral every 4 hours PRN  enoxaparin Injectable 40 milliGRAM(s) SubCutaneous every 24 hours  HYDROmorphone   Tablet 2 milliGRAM(s) Oral every 4 hours  HYDROmorphone  Injectable 0.5 milliGRAM(s) IV Push every 4 hours PRN  melatonin 3 milliGRAM(s) Oral at bedtime PRN  methylPREDNISolone sodium succinate Injectable 40 milliGRAM(s) IV Push daily  ondansetron Injectable 4 milliGRAM(s) IV Push every 8 hours PRN  piperacillin/tazobactam IVPB.. 3.375 Gram(s) IV Intermittent every 8 hours  potassium chloride    Tablet ER 40 milliEquivalent(s) Oral every 4 hours  vancomycin  IVPB 1000 milliGRAM(s) IV Intermittent every 12 hours       REVIEW OF SYSTEMS:  CONSTITUTIONAL:  No Fever or chills  HEENT:  see history  CARDIOVASCULAR:  No chest pain or SOB  RESPIRATORY:  No cough, shortness of breath  GASTROINTESTINAL:  No nausea, vomiting or diarrhea.  GENITOURINARY:  No dysuria  MUSCULOSKELETAL:  no joint aches, no muscle pain  NEUROLOGIC:  No headache or dizziness  PSYCHIATRIC:  No disorder of thought or mood.     Physical Exam:  ICU Vital Signs Last 24 Hrs  T(C): 37.1 (04 Jun 2025 12:13), Max: 37.1 (04 Jun 2025 12:13)  T(F): 98.7 (04 Jun 2025 12:13), Max: 98.7 (04 Jun 2025 12:13)  HR: 71 (04 Jun 2025 13:14) (61 - 81)  BP: 125/74 (04 Jun 2025 12:13) (122/72 - 148/91)  BP(mean): --  ABP: --  ABP(mean): --  RR: 19 (04 Jun 2025 12:13) (18 - 19)  SpO2: 97% (04 Jun 2025 13:14) (94% - 98%)    O2 Parameters below as of 04 Jun 2025 13:14  Patient On (Oxygen Delivery Method): room air           GEN: NAD  HEENT: normocephalic and atraumatic.+ crusting on nose  NECK: Supple.   LUNGS: Normal respiratory effort  HEART: Regular rate and rhythm   ABDOMEN: Soft, nontender, and nondistended.    EXTREMITIES: No leg edema.  NEUROLOGIC: grossly intact.  PSYCHIATRIC: Appropriate affect .      Labs:  06-04    141  |  108  |  17  ----------------------------<  155[H]  3.1[L]   |  25  |  0.54    Ca    8.3[L]      04 Jun 2025 10:30  Phos  3.4     06-03  Mg     2.3     06-03    TPro  6.4  /  Alb  2.8[L]  /  TBili  0.2  /  DBili  x   /  AST  8[L]  /  ALT  14  /  AlkPhos  57  06-03                          9.8    9.71  )-----------( 308      ( 04 Jun 2025 10:30 )             30.5       Urinalysis Basic - ( 04 Jun 2025 10:30 )    Color: x / Appearance: x / SG: x / pH: x  Gluc: 155 mg/dL / Ketone: x  / Bili: x / Urobili: x   Blood: x / Protein: x / Nitrite: x   Leuk Esterase: x / RBC: x / WBC x   Sq Epi: x / Non Sq Epi: x / Bacteria: x      LIVER FUNCTIONS - ( 03 Jun 2025 14:15 )  Alb: 2.8 g/dL / Pro: 6.4 g/dL / ALK PHOS: 57 U/L / ALT: 14 U/L / AST: 8 U/L / GGT: x             RECENT CULTURES:        All imaging and data are reviewed.     < from: MR Facial Bones Only w/wo IV Cont (06.04.25 @ 08:24) >  FINDINGS:  Sinonasal cavities: A defect of the inferior nasal septum is again seen.    Defect/erosions of the left hard palate again seen. There is mild   surrounding marrow edema and enhancement slightly extending across the   midline to the right and extending towardsthe left alveolar process   (9-15).    Moderate mucosal thickening is seen in the left maxillary sinus along the   alveolar recess. No abscess identified.    Mild mucosal thickening in the bilateral anterior ethmoid air cells.    Remaining osseous structures are grossly unremarkable.    Skin and subcutaneous soft tissues: No asymmetrical soft tissue swelling   or hematoma.    Orbits: The globes demonstrate normal contour. The lenses are normally   located. The optic nerve sheath complexes appear intact. There is no   retrobulbar mass. The extraocular muscles appear normal in size    Visualized aerodigestive tract: Normal. There is no abnormal enhancement.    Mastoid air cells: Clear.    Partially visualized intracranial structures: Normal.      IMPRESSION:  Left hard palate defects/erosions with mild surrounding marrow edema and   enhancement. This could represent osteomyelitis or reactive osteitis.   Moderate mucosal thickening in the left inferior maxillary sinus. No   abscess identified.    Nasal septal defect is again seen.    --- End of Report ---          < end of copied text >

## 2025-06-05 LAB
ALBUMIN SERPL ELPH-MCNC: 3.2 G/DL — LOW (ref 3.3–5)
ALP SERPL-CCNC: 58 U/L — SIGNIFICANT CHANGE UP (ref 40–120)
ALT FLD-CCNC: 51 U/L — SIGNIFICANT CHANGE UP (ref 12–78)
ANION GAP SERPL CALC-SCNC: 6 MMOL/L — SIGNIFICANT CHANGE UP (ref 5–17)
AST SERPL-CCNC: 17 U/L — SIGNIFICANT CHANGE UP (ref 15–37)
BILIRUB SERPL-MCNC: 0.3 MG/DL — SIGNIFICANT CHANGE UP (ref 0.2–1.2)
BUN SERPL-MCNC: 16 MG/DL — SIGNIFICANT CHANGE UP (ref 7–23)
CALCIUM SERPL-MCNC: 9.1 MG/DL — SIGNIFICANT CHANGE UP (ref 8.5–10.1)
CHLORIDE SERPL-SCNC: 103 MMOL/L — SIGNIFICANT CHANGE UP (ref 96–108)
CO2 SERPL-SCNC: 28 MMOL/L — SIGNIFICANT CHANGE UP (ref 22–31)
CREAT SERPL-MCNC: 0.51 MG/DL — SIGNIFICANT CHANGE UP (ref 0.5–1.3)
CRP SERPL-MCNC: 9 MG/L — HIGH
EGFR: 112 ML/MIN/1.73M2 — SIGNIFICANT CHANGE UP
EGFR: 112 ML/MIN/1.73M2 — SIGNIFICANT CHANGE UP
FUNGITELL: 54 PG/ML — SIGNIFICANT CHANGE UP
FUNGITELL: <31 PG/ML — SIGNIFICANT CHANGE UP
GLUCOSE SERPL-MCNC: 113 MG/DL — HIGH (ref 70–99)
HCT VFR BLD CALC: 35.6 % — SIGNIFICANT CHANGE UP (ref 34.5–45)
HGB BLD-MCNC: 11.7 G/DL — SIGNIFICANT CHANGE UP (ref 11.5–15.5)
MAGNESIUM SERPL-MCNC: 2.3 MG/DL — SIGNIFICANT CHANGE UP (ref 1.6–2.6)
MCHC RBC-ENTMCNC: 29.5 PG — SIGNIFICANT CHANGE UP (ref 27–34)
MCHC RBC-ENTMCNC: 32.9 G/DL — SIGNIFICANT CHANGE UP (ref 32–36)
MCV RBC AUTO: 89.7 FL — SIGNIFICANT CHANGE UP (ref 80–100)
NRBC BLD AUTO-RTO: 0 /100 WBCS — SIGNIFICANT CHANGE UP (ref 0–0)
PHOSPHATE SERPL-MCNC: 3.2 MG/DL — SIGNIFICANT CHANGE UP (ref 2.5–4.5)
PLATELET # BLD AUTO: 397 K/UL — SIGNIFICANT CHANGE UP (ref 150–400)
POTASSIUM SERPL-MCNC: 4.6 MMOL/L — SIGNIFICANT CHANGE UP (ref 3.5–5.3)
POTASSIUM SERPL-SCNC: 4.6 MMOL/L — SIGNIFICANT CHANGE UP (ref 3.5–5.3)
PROT SERPL-MCNC: 7 G/DL — SIGNIFICANT CHANGE UP (ref 6–8.3)
RBC # BLD: 3.97 M/UL — SIGNIFICANT CHANGE UP (ref 3.8–5.2)
RBC # FLD: 13.1 % — SIGNIFICANT CHANGE UP (ref 10.3–14.5)
SODIUM SERPL-SCNC: 137 MMOL/L — SIGNIFICANT CHANGE UP (ref 135–145)
WBC # BLD: 10.22 K/UL — SIGNIFICANT CHANGE UP (ref 3.8–10.5)
WBC # FLD AUTO: 10.22 K/UL — SIGNIFICANT CHANGE UP (ref 3.8–10.5)

## 2025-06-05 PROCEDURE — 99232 SBSQ HOSP IP/OBS MODERATE 35: CPT

## 2025-06-05 PROCEDURE — 99233 SBSQ HOSP IP/OBS HIGH 50: CPT

## 2025-06-05 RX ORDER — KETOROLAC TROMETHAMINE 30 MG/ML
15 INJECTION, SOLUTION INTRAMUSCULAR; INTRAVENOUS EVERY 8 HOURS
Refills: 0 | Status: DISCONTINUED | OUTPATIENT
Start: 2025-06-05 | End: 2025-06-06

## 2025-06-05 RX ADMIN — IPRATROPIUM BROMIDE AND ALBUTEROL SULFATE 3 MILLILITER(S): .5; 2.5 SOLUTION RESPIRATORY (INHALATION) at 07:16

## 2025-06-05 RX ADMIN — Medication 2 MILLIGRAM(S): at 14:06

## 2025-06-05 RX ADMIN — Medication 0.5 MILLIGRAM(S): at 23:24

## 2025-06-05 RX ADMIN — Medication 0.5 MILLIGRAM(S): at 01:11

## 2025-06-05 RX ADMIN — Medication 2 MILLIGRAM(S): at 17:27

## 2025-06-05 RX ADMIN — KETOROLAC TROMETHAMINE 15 MILLIGRAM(S): 30 INJECTION, SOLUTION INTRAMUSCULAR; INTRAVENOUS at 18:36

## 2025-06-05 RX ADMIN — Medication 150 MILLIGRAM(S): at 21:46

## 2025-06-05 RX ADMIN — KETOROLAC TROMETHAMINE 15 MILLIGRAM(S): 30 INJECTION, SOLUTION INTRAMUSCULAR; INTRAVENOUS at 22:23

## 2025-06-05 RX ADMIN — Medication 250 MILLIGRAM(S): at 17:27

## 2025-06-05 RX ADMIN — KETOROLAC TROMETHAMINE 15 MILLIGRAM(S): 30 INJECTION, SOLUTION INTRAMUSCULAR; INTRAVENOUS at 21:46

## 2025-06-05 RX ADMIN — Medication 2 MILLIGRAM(S): at 09:47

## 2025-06-05 RX ADMIN — Medication 2 MILLIGRAM(S): at 01:11

## 2025-06-05 RX ADMIN — Medication 0.5 MILLIGRAM(S): at 12:03

## 2025-06-05 RX ADMIN — IPRATROPIUM BROMIDE AND ALBUTEROL SULFATE 3 MILLILITER(S): .5; 2.5 SOLUTION RESPIRATORY (INHALATION) at 13:58

## 2025-06-05 RX ADMIN — Medication 0.5 MILLIGRAM(S): at 12:38

## 2025-06-05 RX ADMIN — METHYLPREDNISOLONE ACETATE 40 MILLIGRAM(S): 80 INJECTION, SUSPENSION INTRA-ARTICULAR; INTRALESIONAL; INTRAMUSCULAR; SOFT TISSUE at 05:17

## 2025-06-05 RX ADMIN — Medication 2 MILLIGRAM(S): at 21:46

## 2025-06-05 RX ADMIN — Medication 2 MILLIGRAM(S): at 22:23

## 2025-06-05 RX ADMIN — Medication 0.5 MILLIGRAM(S): at 07:41

## 2025-06-05 RX ADMIN — Medication 2 MILLIGRAM(S): at 05:17

## 2025-06-05 RX ADMIN — Medication 2 MILLIGRAM(S): at 06:25

## 2025-06-05 RX ADMIN — CEFTRIAXONE 100 MILLIGRAM(S): 500 INJECTION, POWDER, FOR SOLUTION INTRAMUSCULAR; INTRAVENOUS at 15:47

## 2025-06-05 RX ADMIN — Medication 0.5 MILLIGRAM(S): at 22:32

## 2025-06-05 RX ADMIN — Medication 0.5 MILLIGRAM(S): at 01:58

## 2025-06-05 RX ADMIN — Medication 250 MILLIGRAM(S): at 05:17

## 2025-06-05 RX ADMIN — Medication 2 MILLIGRAM(S): at 01:41

## 2025-06-05 RX ADMIN — IPRATROPIUM BROMIDE AND ALBUTEROL SULFATE 3 MILLILITER(S): .5; 2.5 SOLUTION RESPIRATORY (INHALATION) at 19:25

## 2025-06-05 RX ADMIN — Medication 0.5 MILLIGRAM(S): at 18:03

## 2025-06-05 RX ADMIN — IPRATROPIUM BROMIDE AND ALBUTEROL SULFATE 3 MILLILITER(S): .5; 2.5 SOLUTION RESPIRATORY (INHALATION) at 00:36

## 2025-06-05 RX ADMIN — Medication 0.5 MILLIGRAM(S): at 18:27

## 2025-06-05 RX ADMIN — ENOXAPARIN SODIUM 40 MILLIGRAM(S): 100 INJECTION SUBCUTANEOUS at 12:07

## 2025-06-05 RX ADMIN — Medication 0.5 MILLIGRAM(S): at 08:30

## 2025-06-05 NOTE — PROGRESS NOTE ADULT - SUBJECTIVE AND OBJECTIVE BOX
Patient is a 54y old  Female who presents with a chief complaint of Sinusitis, pain management (2025 16:57)      FROM ADMISSION H+P:   HPI:  55yo F with PMHx of asthma/COPD (no home O2), vasculitis presents to the ED with persistent severe diffuse facial pain, nasal edema, associated with thick mucous nasal discharge x1.5 week. Patient describes her facial pain as deep bone pain and soft tissue pain, stating she feels pain starts in her nose and radiates to her gums. Patient states she feels this is vasculitis flare-up and feels similar to her only prior flare-up 3 months ago. Patient has been using daily nasal saline as recommended. Most recently, she went to Urgent Care 5 days ago for the pain, where she was prescribed doxycycline 100mg BID (completed 5-days of doxy so far). Patient admits to trying to remove scabs in nares with this flare up. Patient reports dilaudid helped bring pain down from 10/10 to 6/10, but pain is creeping up again. She reports she has Hx of asthma and usually uses inhalers PRN not daily, but has been using them 2-3x a day over the past 1.5 wks since the flare-up started. Patient also states she feels like is having discomfort with swallowing, intermittent blurry vision associated with the flare-ups; denies blurry vision at this time. She endorses occasional focal rashes, palpitations in the past, but denies any rashes at this time. Endorses diarrhea yesterday evening; denies fever, chills, nausea, vomiting. Patient follows with Rheum, ENT for recently diagnosed vasculitis; currently, workup in progress with Rheum to determine which kind; she states that her last visit was 1-2 mo ago. Patient endorses FHx of autoimmune conditions; she states her daughter is currently undergoing workup for SLE.    Denies chest pain, SOB, cough, abdominal pain, nausea, vomiting, urinary frequency, urgency, dysuria, changes in vision, dizziness, numbness, tingling.    Denies recent international travel or sick contacts.    ED Course:   Vitals: BP: 129/85 , HR: 88 , Temp: 98.8, RR: 20 , SpO2: 94% on RA   Labs:   WBCs 9.59, H/H 11.4/34.1, BUN/Cr 17/0.67, Na 142, K 3.3  CT maxillofacial w/ IV cont:   1.  Chronic perforation of the nasal septum, with erosion of the left   inferior turbinate.  2.  Chronic erosive changes in the left hard palate, with improved   phlegmon in the soft palate.  3.  Correlate clinically to exclude chronic osteomyelitis.  4.  Consider maxillofacial MRI with gadolinium.    Received in the ED: Augmentin 875mg PO x1, Solumedrol 125mg IVP x1, dilaudid 1mg IVP x1, morphine 4mg IVP x1, Percocet 5mg-325mg PO x1, Mg 2g x1, KCl 40 mEq x1, Duonebs x1   (2025 00:33)      INTERVAL HPI/OVERNIGHT EVENTS: Patient was seen and examined. No acute events occurred overnight. No new complaints.    I&O's Summary    2025 07:01  -  2025 07:00  --------------------------------------------------------  IN: 1375 mL / OUT: 0 mL / NET: 1375 mL    2025 07:01  -  2025 18:15  --------------------------------------------------------  IN: 1025 mL / OUT: 0 mL / NET: 1025 mL        PAST MEDICAL & SURGICAL HISTORY:  Asthma      Menorrhagia      Fibroids  uterine      ADHD (attention deficit hyperactivity disorder)      Obesity      Asthma      Perforated nasal septum      ADHD      S/P tubal ligation      S/P  section        S/P tonsillectomy      S/P endometrial ablation            LABS:                        11.7   10.22 )-----------( 397      ( 2025 07:42 )             35.6         137  |  103  |  16  ----------------------------<  113[H]  4.6   |  28  |  0.51    Ca    9.1      2025 07:42  Phos  3.2     06-  Mg     2.3     -    TPro  7.0  /  Alb  3.2[L]  /  TBili  0.3  /  DBili  x   /  AST  17  /  ALT  51  /  AlkPhos  58  -      Urinalysis Basic - ( 2025 07:42 )    Color: x / Appearance: x / SG: x / pH: x  Gluc: 113 mg/dL / Ketone: x  / Bili: x / Urobili: x   Blood: x / Protein: x / Nitrite: x   Leuk Esterase: x / RBC: x / WBC x   Sq Epi: x / Non Sq Epi: x / Bacteria: x      CAPILLARY BLOOD GLUCOSE            Urinalysis Basic - ( 2025 07:42 )    Color: x / Appearance: x / SG: x / pH: x  Gluc: 113 mg/dL / Ketone: x  / Bili: x / Urobili: x   Blood: x / Protein: x / Nitrite: x   Leuk Esterase: x / RBC: x / WBC x   Sq Epi: x / Non Sq Epi: x / Bacteria: x        MEDICATIONS  (STANDING):  albuterol/ipratropium for Nebulization 3 milliLiter(s) Nebulizer every 6 hours  cefTRIAXone   IVPB 1000 milliGRAM(s) IV Intermittent every 24 hours  enoxaparin Injectable 40 milliGRAM(s) SubCutaneous every 24 hours  HYDROmorphone   Tablet 2 milliGRAM(s) Oral every 4 hours  ketorolac   Injectable 15 milliGRAM(s) IV Push every 8 hours  methylPREDNISolone sodium succinate Injectable 40 milliGRAM(s) IV Push daily  traZODone 150 milliGRAM(s) Oral at bedtime  vancomycin  IVPB 1000 milliGRAM(s) IV Intermittent every 12 hours    MEDICATIONS  (PRN):  acetaminophen     Tablet .. 650 milliGRAM(s) Oral every 6 hours PRN Temp greater or equal to 38C (100.4F), Mild Pain (1 - 3)  albuterol    90 MICROgram(s) HFA Inhaler 2 Puff(s) Inhalation every 6 hours PRN Bronchospasm  aluminum hydroxide/magnesium hydroxide/simethicone Suspension 30 milliLiter(s) Oral every 4 hours PRN Dyspepsia  HYDROmorphone  Injectable 0.5 milliGRAM(s) IV Push every 4 hours PRN Severe Pain (7 - 10)  melatonin 3 milliGRAM(s) Oral at bedtime PRN Insomnia  ondansetron Injectable 4 milliGRAM(s) IV Push every 8 hours PRN Nausea and/or Vomiting      REVIEW OF SYSTEMS:  CONSTITUTIONAL: No fever or chills  HEENT:  No headache, no sore throat  RESPIRATORY: No cough, wheezing, or shortness of breath  CARDIOVASCULAR: No chest pain, palpitations  GASTROINTESTINAL: No abdominal pain, nausea, vomiting, or diarrhea  GENITOURINARY: No dysuria, frequency, or hematuria  NEUROLOGICAL: no focal weakness or dizziness  MUSCULOSKELETAL: no myalgias  PSYCH: no recent changes in mood    RADIOLOGY & ADDITIONAL TESTS:    Imaging Personally Reviewed:  [x] YES  [ ] NO    Consultant(s) Notes Reviewed:  [x] YES  [ ] NO    PHYSICAL EXAM:  T(C): 36.7 (25 @ 11:42), Max: 36.7 (25 @ 20:44)  HR: 75 (25 @ 14:20) (71 - 83)  BP: 117/79 (25 @ 11:42) (111/72 - 120/79)  RR: 18 (25 @ 11:42) (18 - 19)  SpO2: 98% (25 @ 14:20) (93% - 98%)    GENERAL: NAD, well-developed, well-groomed  HEENT:  anicteric, moist mucous membranes  CHEST/LUNG:  CTA b/l, no rales, wheezes, or rhonchi  HEART:  RRR, S1, S2  ABDOMEN:  BS+, soft, nontender, nondistended  EXTREMITIES: no edema, cyanosis, or calf tenderness  NERVOUS SYSTEM: answers questions and follows commands appropriately  PSYCH: normal affect    Care Discussed with Consultants/Other Providers [x] YES  [ ] NO Patient is a 54y old  Female who presents with a chief complaint of Sinusitis, pain management (2025 16:57)      FROM ADMISSION H+P:   HPI:  55yo F with PMHx of asthma/COPD (no home O2), vasculitis presents to the ED with persistent severe diffuse facial pain, nasal edema, associated with thick mucous nasal discharge x1.5 week. Patient describes her facial pain as deep bone pain and soft tissue pain, stating she feels pain starts in her nose and radiates to her gums. Patient states she feels this is vasculitis flare-up and feels similar to her only prior flare-up 3 months ago. Patient has been using daily nasal saline as recommended. Most recently, she went to Urgent Care 5 days ago for the pain, where she was prescribed doxycycline 100mg BID (completed 5-days of doxy so far). Patient admits to trying to remove scabs in nares with this flare up. Patient reports dilaudid helped bring pain down from 10/10 to 6/10, but pain is creeping up again. She reports she has Hx of asthma and usually uses inhalers PRN not daily, but has been using them 2-3x a day over the past 1.5 wks since the flare-up started. Patient also states she feels like is having discomfort with swallowing, intermittent blurry vision associated with the flare-ups; denies blurry vision at this time. She endorses occasional focal rashes, palpitations in the past, but denies any rashes at this time. Endorses diarrhea yesterday evening; denies fever, chills, nausea, vomiting. Patient follows with Rheum, ENT for recently diagnosed vasculitis; currently, workup in progress with Rheum to determine which kind; she states that her last visit was 1-2 mo ago. Patient endorses FHx of autoimmune conditions; she states her daughter is currently undergoing workup for SLE.    Denies chest pain, SOB, cough, abdominal pain, nausea, vomiting, urinary frequency, urgency, dysuria, changes in vision, dizziness, numbness, tingling.    Denies recent international travel or sick contacts.    ED Course:   Vitals: BP: 129/85 , HR: 88 , Temp: 98.8, RR: 20 , SpO2: 94% on RA   Labs:   WBCs 9.59, H/H 11.4/34.1, BUN/Cr 17/0.67, Na 142, K 3.3  CT maxillofacial w/ IV cont:   1.  Chronic perforation of the nasal septum, with erosion of the left   inferior turbinate.  2.  Chronic erosive changes in the left hard palate, with improved   phlegmon in the soft palate.  3.  Correlate clinically to exclude chronic osteomyelitis.  4.  Consider maxillofacial MRI with gadolinium.    Received in the ED: Augmentin 875mg PO x1, Solumedrol 125mg IVP x1, dilaudid 1mg IVP x1, morphine 4mg IVP x1, Percocet 5mg-325mg PO x1, Mg 2g x1, KCl 40 mEq x1, Duonebs x1   (2025 00:33)      INTERVAL HPI/OVERNIGHT EVENTS: Patient was seen and examined. No acute events occurred overnight. No new complaints. States pain is more localized now. Reports cocaine use in 20s. Agreeable for long term IV antibiotics to be facilitated by [facility]. Tolerating IV abx. Wants to keep pain regimen as needed. Was taking HIGH DOSE nsaids for pain at home - states nsaids effective. Dilaudid helping control pain but almost feels like she is wasting it as it does not keep her out of discomfort,    I&O's Summary    2025 07:  -  2025 07:00  --------------------------------------------------------  IN: 1375 mL / OUT: 0 mL / NET: 1375 mL    2025 07:  -  2025 18:15  --------------------------------------------------------  IN: 1025 mL / OUT: 0 mL / NET: 1025 mL        PAST MEDICAL & SURGICAL HISTORY:  Asthma      Menorrhagia      Fibroids  uterine      ADHD (attention deficit hyperactivity disorder)      Obesity      Asthma      Perforated nasal septum      ADHD      S/P tubal ligation      S/P  section        S/P tonsillectomy      S/P endometrial ablation            LABS:                        11.7   10.22 )-----------( 397      ( 2025 07:42 )             35.6     06-05    137  |  103  |  16  ----------------------------<  113[H]  4.6   |  28  |  0.51    Ca    9.1      2025 07:42  Phos  3.2     06-05  Mg     2.3     06-05    TPro  7.0  /  Alb  3.2[L]  /  TBili  0.3  /  DBili  x   /  AST  17  /  ALT  51  /  AlkPhos  58  06-05      Urinalysis Basic - ( 2025 07:42 )    Color: x / Appearance: x / SG: x / pH: x  Gluc: 113 mg/dL / Ketone: x  / Bili: x / Urobili: x   Blood: x / Protein: x / Nitrite: x   Leuk Esterase: x / RBC: x / WBC x   Sq Epi: x / Non Sq Epi: x / Bacteria: x      CAPILLARY BLOOD GLUCOSE            Urinalysis Basic - ( 2025 07:42 )    Color: x / Appearance: x / SG: x / pH: x  Gluc: 113 mg/dL / Ketone: x  / Bili: x / Urobili: x   Blood: x / Protein: x / Nitrite: x   Leuk Esterase: x / RBC: x / WBC x   Sq Epi: x / Non Sq Epi: x / Bacteria: x        MEDICATIONS  (STANDING):  albuterol/ipratropium for Nebulization 3 milliLiter(s) Nebulizer every 6 hours  cefTRIAXone   IVPB 1000 milliGRAM(s) IV Intermittent every 24 hours  enoxaparin Injectable 40 milliGRAM(s) SubCutaneous every 24 hours  HYDROmorphone   Tablet 2 milliGRAM(s) Oral every 4 hours  ketorolac   Injectable 15 milliGRAM(s) IV Push every 8 hours  methylPREDNISolone sodium succinate Injectable 40 milliGRAM(s) IV Push daily  traZODone 150 milliGRAM(s) Oral at bedtime  vancomycin  IVPB 1000 milliGRAM(s) IV Intermittent every 12 hours    MEDICATIONS  (PRN):  acetaminophen     Tablet .. 650 milliGRAM(s) Oral every 6 hours PRN Temp greater or equal to 38C (100.4F), Mild Pain (1 - 3)  albuterol    90 MICROgram(s) HFA Inhaler 2 Puff(s) Inhalation every 6 hours PRN Bronchospasm  aluminum hydroxide/magnesium hydroxide/simethicone Suspension 30 milliLiter(s) Oral every 4 hours PRN Dyspepsia  HYDROmorphone  Injectable 0.5 milliGRAM(s) IV Push every 4 hours PRN Severe Pain (7 - 10)  melatonin 3 milliGRAM(s) Oral at bedtime PRN Insomnia  ondansetron Injectable 4 milliGRAM(s) IV Push every 8 hours PRN Nausea and/or Vomiting      REVIEW OF SYSTEMS:  CONSTITUTIONAL: No fever or chills  HEENT:  No headache, no sore throat; + nasal pain + black discharge  RESPIRATORY: No cough, wheezing, or shortness of breath  CARDIOVASCULAR: No chest pain, palpitations  GASTROINTESTINAL: No abdominal pain, nausea, vomiting, or diarrhea  GENITOURINARY: No dysuria, frequency, or hematuria  NEUROLOGICAL: no focal weakness or dizziness  MUSCULOSKELETAL: no myalgias  PSYCH: no recent changes in mood    RADIOLOGY & ADDITIONAL TESTS:    Imaging Personally Reviewed:  [x] YES  [ ] NO    Consultant(s) Notes Reviewed:  [x] YES  [ ] NO    PHYSICAL EXAM:  T(C): 36.7 (25 @ 11:42), Max: 36.7 (25 @ 20:44)  HR: 75 (25 @ 14:20) (71 - 83)  BP: 117/79 (25 @ 11:42) (111/72 - 120/79)  RR: 18 (25 @ 11:42) (18 - 19)  SpO2: 98% (25 @ 14:20) (93% - 98%)    GENERAL: NAD  HEENT:  anicteric, moist mucous membranes  CHEST/LUNG:  CTA b/l, no rales, wheezes, or rhonchi  HEART:  RRR, S1, S2  ABDOMEN:  BS+, soft, nontender, nondistended  EXTREMITIES: no edema, cyanosis, or calf tenderness  NERVOUS SYSTEM: answers questions and follows commands appropriately  PSYCH: normal affect    Care Discussed with Consultants/Other Providers [x] YES  [ ] NO

## 2025-06-05 NOTE — PROGRESS NOTE ADULT - SUBJECTIVE AND OBJECTIVE BOX
[facility]  INFECTIOUS DISEASES - Doug Buck, [place] [address]  Tel: [phone]     Fax: [phone]  =======================================================    SONU JUDD [id number]    Follow up:    Allergies:  sulfa drugs (Hives; Rash)  sulfa drugs (Hives; Urticaria)      Antibiotics:  acetaminophen     Tablet .. 650 milliGRAM(s) Oral every 6 hours PRN  albuterol    90 MICROgram(s) HFA Inhaler 2 Puff(s) Inhalation every 6 hours PRN  albuterol/ipratropium for Nebulization 3 milliLiter(s) Nebulizer every 6 hours  aluminum hydroxide/magnesium hydroxide/simethicone Suspension 30 milliLiter(s) Oral every 4 hours PRN  cefTRIAXone   IVPB 1000 milliGRAM(s) IV Intermittent every 24 hours  enoxaparin Injectable 40 milliGRAM(s) SubCutaneous every 24 hours  HYDROmorphone   Tablet 2 milliGRAM(s) Oral every 4 hours  HYDROmorphone  Injectable 0.5 milliGRAM(s) IV Push every 4 hours PRN  melatonin 3 milliGRAM(s) Oral at bedtime PRN  methylPREDNISolone sodium succinate Injectable 40 milliGRAM(s) IV Push daily  ondansetron Injectable 4 milliGRAM(s) IV Push every 8 hours PRN  traZODone 150 milliGRAM(s) Oral at bedtime  vancomycin  IVPB 1000 milliGRAM(s) IV Intermittent every 12 hours       REVIEW OF SYSTEMS:  CONSTITUTIONAL:  No Fever or chills  HEENT:   No sore throat or runny nose.  CARDIOVASCULAR:  No chest pain or SOB  RESPIRATORY:  No cough, shortness of breath  GASTROINTESTINAL:  No nausea, vomiting or diarrhea.  GENITOURINARY:  No dysuria, frequency or urgency  MUSCULOSKELETAL:  no joint aches, no muscle pain  SKIN:  No change in skin, hair or nails.  NEUROLOGIC:  No headache or dizziness  PSYCHIATRIC:  No disorder of thought or mood.     Physical Exam:  ICU Vital Signs Last 24 Hrs  T(C): 36.7 (05 Jun 2025 11:42), Max: 36.7 (04 Jun 2025 20:44)  T(F): 98 (05 Jun 2025 11:42), Max: 98.1 (04 Jun 2025 20:44)  HR: 75 (05 Jun 2025 14:20) (71 - 83)  BP: 117/79 (05 Jun 2025 11:42) (111/72 - 120/79)  BP(mean): --  ABP: --  ABP(mean): --  RR: 18 (05 Jun 2025 11:42) (18 - 19)  SpO2: 98% (05 Jun 2025 14:20) (93% - 98%)    O2 Parameters below as of 05 Jun 2025 14:20  Patient On (Oxygen Delivery Method): room air           GEN: NAD  HEENT: normocephalic and atraumatic.  NECK: Supple.  No lymphadenopathy   LUNGS: Clear to auscultation.  HEART: Regular rate and rhythm   ABDOMEN: Soft, nontender, and nondistended.    : No CVA tenderness  EXTREMITIES: No leg edema.  MSK: no joint swelling  NEUROLOGIC: grossly intact.  PSYCHIATRIC: Appropriate affect .  SKIN: No ulceration or induration present.      Labs:  06-05    137  |  103  |  16  ----------------------------<  113[H]  4.6   |  28  |  0.51    Ca    9.1      05 Jun 2025 07:42  Phos  3.2     06-05  Mg     2.3     06-05    TPro  7.0  /  Alb  3.2[L]  /  TBili  0.3  /  DBili  x   /  AST  17  /  ALT  51  /  AlkPhos  58  06-05                          11.7   10.22 )-----------( 397      ( 05 Jun 2025 07:42 )             35.6       Urinalysis Basic - ( 05 Jun 2025 07:42 )    Color: x / Appearance: x / SG: x / pH: x  Gluc: 113 mg/dL / Ketone: x  / Bili: x / Urobili: x   Blood: x / Protein: x / Nitrite: x   Leuk Esterase: x / RBC: x / WBC x   Sq Epi: x / Non Sq Epi: x / Bacteria: x      LIVER FUNCTIONS - ( 05 Jun 2025 07:42 )  Alb: 3.2 g/dL / Pro: 7.0 g/dL / ALK PHOS: 58 U/L / ALT: 51 U/L / AST: 17 U/L / GGT: x             RECENT CULTURES:  06-03 @ 16:31 Blood Blood     No growth at 24 hours        06-03 @ 16:20 Blood Blood     No growth at 24 hours              All imaging and data are reviewed.      [facility]  INFECTIOUS DISEASES - Doug Buck, [place] [address]  Tel: [phone]     Fax: [phone]  =======================================================    SONU JUDD [id number]    Follow up: No fevers. Pain around the nose about the same.    Allergies:  sulfa drugs (Hives; Rash)  sulfa drugs (Hives; Urticaria)      Antibiotics:  acetaminophen     Tablet .. 650 milliGRAM(s) Oral every 6 hours PRN  albuterol    90 MICROgram(s) HFA Inhaler 2 Puff(s) Inhalation every 6 hours PRN  albuterol/ipratropium for Nebulization 3 milliLiter(s) Nebulizer every 6 hours  aluminum hydroxide/magnesium hydroxide/simethicone Suspension 30 milliLiter(s) Oral every 4 hours PRN  cefTRIAXone   IVPB 1000 milliGRAM(s) IV Intermittent every 24 hours  enoxaparin Injectable 40 milliGRAM(s) SubCutaneous every 24 hours  HYDROmorphone   Tablet 2 milliGRAM(s) Oral every 4 hours  HYDROmorphone  Injectable 0.5 milliGRAM(s) IV Push every 4 hours PRN  melatonin 3 milliGRAM(s) Oral at bedtime PRN  methylPREDNISolone sodium succinate Injectable 40 milliGRAM(s) IV Push daily  ondansetron Injectable 4 milliGRAM(s) IV Push every 8 hours PRN  traZODone 150 milliGRAM(s) Oral at bedtime  vancomycin  IVPB 1000 milliGRAM(s) IV Intermittent every 12 hours       REVIEW OF SYSTEMS:  CONSTITUTIONAL:  No Fever or chills  HEENT:  see history  CARDIOVASCULAR:  No chest pain or SOB  RESPIRATORY:  No cough, shortness of breath  GASTROINTESTINAL:  No nausea, vomiting or diarrhea.  GENITOURINARY:  No dysuria  MUSCULOSKELETAL:  no joint aches, no muscle pain  NEUROLOGIC:  No headache or dizziness  PSYCHIATRIC:  No disorder of thought or mood.     Physical Exam:  ICU Vital Signs Last 24 Hrs  T(C): 36.7 (05 Jun 2025 11:42), Max: 36.7 (04 Jun 2025 20:44)  T(F): 98 (05 Jun 2025 11:42), Max: 98.1 (04 Jun 2025 20:44)  HR: 75 (05 Jun 2025 14:20) (71 - 83)  BP: 117/79 (05 Jun 2025 11:42) (111/72 - 120/79)  BP(mean): --  ABP: --  ABP(mean): --  RR: 18 (05 Jun 2025 11:42) (18 - 19)  SpO2: 98% (05 Jun 2025 14:20) (93% - 98%)    O2 Parameters below as of 05 Jun 2025 14:20  Patient On (Oxygen Delivery Method): room air      GEN: NAD  HEENT: normocephalic and atraumatic.no drainage from nose, swelling decreased  NECK: Supple.   LUNGS: Normal respiratory effort  HEART: Regular rate and rhythm   ABDOMEN: Soft, nontender, and nondistended.    EXTREMITIES: No leg edema.  NEUROLOGIC: grossly intact.  PSYCHIATRIC: Appropriate affect .      Labs:  06-05    137  |  103  |  16  ----------------------------<  113[H]  4.6   |  28  |  0.51    Ca    9.1      05 Jun 2025 07:42  Phos  3.2     06-05  Mg     2.3     06-05    TPro  7.0  /  Alb  3.2[L]  /  TBili  0.3  /  DBili  x   /  AST  17  /  ALT  51  /  AlkPhos  58  06-05                          11.7   10.22 )-----------( 397      ( 05 Jun 2025 07:42 )             35.6       Urinalysis Basic - ( 05 Jun 2025 07:42 )    Color: x / Appearance: x / SG: x / pH: x  Gluc: 113 mg/dL / Ketone: x  / Bili: x / Urobili: x   Blood: x / Protein: x / Nitrite: x   Leuk Esterase: x / RBC: x / WBC x   Sq Epi: x / Non Sq Epi: x / Bacteria: x      LIVER FUNCTIONS - ( 05 Jun 2025 07:42 )  Alb: 3.2 g/dL / Pro: 7.0 g/dL / ALK PHOS: 58 U/L / ALT: 51 U/L / AST: 17 U/L / GGT: x             RECENT CULTURES:  06-03 @ 16:31 Blood Blood     No growth at 24 hours        06-03 @ 16:20 Blood Blood     No growth at 24 hours              All imaging and data are reviewed.

## 2025-06-05 NOTE — DIETITIAN INITIAL EVALUATION ADULT - PERTINENT MEDS FT
MEDICATIONS  (STANDING):  albuterol/ipratropium for Nebulization 3 milliLiter(s) Nebulizer every 6 hours  cefTRIAXone   IVPB 1000 milliGRAM(s) IV Intermittent every 24 hours  enoxaparin Injectable 40 milliGRAM(s) SubCutaneous every 24 hours  HYDROmorphone   Tablet 2 milliGRAM(s) Oral every 4 hours  methylPREDNISolone sodium succinate Injectable 40 milliGRAM(s) IV Push daily  traZODone 150 milliGRAM(s) Oral at bedtime  vancomycin  IVPB 1000 milliGRAM(s) IV Intermittent every 12 hours    MEDICATIONS  (PRN):  acetaminophen     Tablet .. 650 milliGRAM(s) Oral every 6 hours PRN Temp greater or equal to 38C (100.4F), Mild Pain (1 - 3)  albuterol    90 MICROgram(s) HFA Inhaler 2 Puff(s) Inhalation every 6 hours PRN Bronchospasm  aluminum hydroxide/magnesium hydroxide/simethicone Suspension 30 milliLiter(s) Oral every 4 hours PRN Dyspepsia  HYDROmorphone  Injectable 0.5 milliGRAM(s) IV Push every 4 hours PRN Severe Pain (7 - 10)  melatonin 3 milliGRAM(s) Oral at bedtime PRN Insomnia  ondansetron Injectable 4 milliGRAM(s) IV Push every 8 hours PRN Nausea and/or Vomiting

## 2025-06-05 NOTE — PROGRESS NOTE ADULT - PROBLEM SELECTOR PLAN 3
Patient reports diarrhea yesterday evening  - Follow up GI PCR Patient reports diarrhea - continue probiotics  - Follow up GI PCR

## 2025-06-05 NOTE — DIETITIAN INITIAL EVALUATION ADULT - PROBLEM SELECTOR PLAN 1
Patient presenting with persistent severe diffuse facial pain, nasal edema, associated with thick mucous nasal discharge x1.5 week, similar to prior vasculitis flare-up 3 mo ago  - No leukocytosis, patient afebrile on admission  - CT maxillofacial showing chronic changes, concern for chronic osteomyelitis - discussed with patient; recommend outpatient follow up with ENT for further workup, imaging  - Given Augmentin 875mg PO x1, Solumedrol 125mg IVP x1, dilaudid 1mg IVP x1, morphine 4mg IVP x1, Percocet 5mg-325mg PO x1 in the ED  - Start Zosyn q8hr   - f/u AM CBC, CMP, ESR, CRP, MRSA PCR  - Trend CBC w/ diff, fever curve   - ID (Dr. Buck) consulted, follow up recs  - Pain management: tylenol PO PRN mild pain, dilaudid 1mg IV q6hr PRN mod/severe pain

## 2025-06-05 NOTE — PROGRESS NOTE ADULT - ASSESSMENT
53yo F with PMHx of asthma/COPD (no home O2), vasculitis, who presents with worsening facial pain and swelling. Symptoms started about 5 months ago with non healing cut on L nares. She said she went to urgent care and diagnosed with MRSA infection. She saw ENT (Dr. Kris Monsalve) 2 months and Rheum (Dr. Luis Manuel Posadas, [facility]) and was diagnosed with vasculitis. She said she had similar episode about 2 months ago and was treated for vasculitis "flare" with oral antibiotics and prednisone.    Patient being treated for sinusitis, but concern for underlying chronic process. Seen by ENT and concern  for chronic sinusitis, rhinitis septal perforation of vascular pathology or Wegeners. ? if any superimposed infection, although CRP low at 10-->8 so suspect more chronic process. MRI showed Left hard palate defects/erosions with mild surrounding marrow edema and enhancement, per report could represent osteomyelitis or reactive osteitis. No abscess seen. No plan for further intervention per ENT. MRSA nasal PCR positive. No fevers and no leukocytosis. Serum Fungitell is low. Blood cultures currently no growth.    #Sinusitis  #Erosive changes on left hard palate  #Chronic perforation of nasal septum  #? Osteomyelitis  #MRSA nasal colonization    -continue vancomycin, AUC based dosing per pharmacy  -continue ceftriaxone--plan for at least 5 days  -follow cultures to completion  -serum galactomannan pending  -appreciate ENT input  -might need longer course of IV antibiotics  -discussed with Dr. Concepcion and Dr. Walter Adkins MD  Division of Infectious Diseases   Cell [phone] between 8am and 6pm   After 6pm and weekends please call ID service at [phone].     35 minutes spent on total encounter assessing patient, examination, chart review, counseling and coordinating care by the attending physician/nurse/care manager.

## 2025-06-05 NOTE — PROGRESS NOTE ADULT - PROBLEM SELECTOR PLAN 4
Hypokalemic again - give PO K  - Trend lytes and replete as needed - Trend lytes and replete as needed

## 2025-06-05 NOTE — PROGRESS NOTE ADULT - SUBJECTIVE AND OBJECTIVE BOX
Physical Medicine and Rehabilitation Subsequent Evaluation    Seen in followup for pain control    Pain better controlled on revised regimen  We discussed adding adjunct pain medications  Discussed with primary team also to consider toradol, which has been helping patient  Goal is overall reduction of opioid use    ROS:  Constitutional: Denies fevers or chills  HEENT: Pain+ but improved    Medications: reviewed and revised    Physical Exam:   Vitals: Reviewed    Constitutional: Gen: In no acute distress, cooperative with exam and questioning   HEENT: Swelling improved overall   Psychiatric: Awake alert fully oriented    A/P 54y year old Female with sinusitis vs vasculitis vs osteomyelitis, facial pain    Addition of toradol to regimen recommended  Addition of gabapentin as patient having some referred pain across mandible  Monitor for now, potentially reduce opioid regimen tomorrow.    Primary team notes are reviewed  Discussed management/coordinated care with primary team/referring provider.  High risk of morbidity from treatment with: schedule II narcotics    55 minutes spent during patient encounter:    ¦ preparing to see the patient   ¦ performing a medically appropriate examination and/or evaluation   ¦ counseling and educating the patient/family/caregiver   ¦ ordering medications, tests, or procedures   ¦ referring and communicating with other health care professionals  ¦ documenting clinical information in the electronic or other health record   ¦ care coordination

## 2025-06-05 NOTE — DIETITIAN INITIAL EVALUATION ADULT - ORAL INTAKE PTA/DIET HISTORY
Pt reports recent decrease in appetite/intake over the past few days PTA 2/2 pain, discomfort when chewing/swallowing. Only fruit she consumes is oranges, pineapple and guillermo.

## 2025-06-05 NOTE — DIETITIAN INITIAL EVALUATION ADULT - PERTINENT LABORATORY DATA
06-05    137  |  103  |  16  ----------------------------<  113[H]  4.6   |  28  |  0.51    Ca    9.1      05 Jun 2025 07:42  Phos  3.2     06-05  Mg     2.3     06-05    TPro  7.0  /  Alb  3.2[L]  /  TBili  0.3  /  DBili  x   /  AST  17  /  ALT  51  /  AlkPhos  58  06-05  A1C with Estimated Average Glucose Result: 5.8 % (06-03-25 @ 14:15)

## 2025-06-05 NOTE — PROGRESS NOTE ADULT - PROBLEM SELECTOR PLAN 1
Patient presenting with persistent severe diffuse facial pain, nasal edema, associated with thick mucous nasal discharge x1.5 week, similar to prior vasculitis flare-up 3 mo ago  - No leukocytosis, patient afebrile on admission  - CT maxillofacial showing chronic changes, concern for chronic osteomyelitis - discussed with patient; recommend outpatient follow up with ENT for further workup, imaging  - Continue vancomycin + rocephin  - f/u AM CBC, CMP, ESR, CRP, MRSA PCR +  - Trend CBC w/ diff, fever curve   - ID (Dr. Adkins) consulted, follow up recs  - Pain management with ATC and PRN dilaudid as per pain mgmt  - Will start IV steroids - MRI reviewed and d/w ENT consultant  - D/w Dr. Watson - states steroids + iv abx. no role for biopsy at present to check labwork first for wegeners and to follow with ENT on discharge Patient presenting with persistent severe diffuse facial pain, nasal edema, associated with thick mucous nasal discharge x1.5 week, similar to prior vasculitis flare-up 3 mo ago  - No leukocytosis, patient afebrile on admission  - CT maxillofacial showing chronic changes, concern for chronic osteomyelitis  - MR with concern for OM - ID recs long term IV abx - pt agreeable to [facility] to facilitate  - Continue vancomycin + rocephin  - Trend CBC w/ diff, fever curve   - ID (Dr. Adkins) consulted, follow up recs  - Pain management with ATC and PRN dilaudid as per pain mgmt - will trial standing toradol q8 x 3 doses - ppi while on NSAIDs/steroids - pt reports significant NSAID use at home  - Continue IV steroids - MRI reviewed and d/w ENT consultant  - D/w Dr. Watson - states steroids + iv abx. no role for biopsy at present to check labwork first for wegeners and to follow with ENT on discharge. Pt saw rheum last month - outpt labs reviewd - per outpt rheum no definitive evidence of a vasculitis

## 2025-06-05 NOTE — DIETITIAN INITIAL EVALUATION ADULT - ADD RECOMMEND
1) Continue current diet as tolerated; honor food preferences as able to optimize po intake/tolerance  2) Electrolyte replacement prn  3) Monitor po intake, diet tolerance, weight trends, labs, GI function, skin integrity

## 2025-06-05 NOTE — PROGRESS NOTE ADULT - PROBLEM SELECTOR PLAN 2
Patient follows with Rheum, ENT for recently diagnosed vasculitis; currently, workup in progress with Rheum to determine which kind  - Continue outpatient follow up with Rheum, ENT  - Rest of plan as above Patient follows with Rheum, ENT for recently diagnosed vasculitis??; currently, workup in progress with Rheum to determine which kind - per rheum message to pt - no definitive evidence of vasculitis - will try to reach o/p md  - Continue outpatient follow up with Rheum, ENT  - Rest of plan as above

## 2025-06-05 NOTE — PROGRESS NOTE ADULT - PROBLEM SELECTOR PLAN 5
Hgb 11.4 on admission. Baseline around 11.6  - Likely anemia of chronic dz 2/2 vasculitis   - No signs or symptoms of acute bleeding  - Follow up AM iron panel  - Trend H/H Hgb 11.4 on admission. Baseline around 11.6  - Likely anemia of chronic dz 2/2 vasculitis   - No signs or symptoms of acute bleeding  - Trend H/H

## 2025-06-05 NOTE — DIETITIAN INITIAL EVALUATION ADULT - PROBLEM SELECTOR PROBLEM 2
[facility]  [address]  [address]  [place], [place] [address]  Tel: [phone]      3/3/2025   SUBJECTIVE/OBJECTIVE  HPI    Cleo Alejandro (:  Vasculitis

## 2025-06-06 LAB
ANA TITR SER: NEGATIVE — SIGNIFICANT CHANGE UP
ANION GAP SERPL CALC-SCNC: 6 MMOL/L — SIGNIFICANT CHANGE UP (ref 5–17)
BUN SERPL-MCNC: 29 MG/DL — HIGH (ref 7–23)
CALCIUM SERPL-MCNC: 8.7 MG/DL — SIGNIFICANT CHANGE UP (ref 8.5–10.1)
CHLORIDE SERPL-SCNC: 102 MMOL/L — SIGNIFICANT CHANGE UP (ref 96–108)
CO2 SERPL-SCNC: 28 MMOL/L — SIGNIFICANT CHANGE UP (ref 22–31)
CREAT SERPL-MCNC: 0.63 MG/DL — SIGNIFICANT CHANGE UP (ref 0.5–1.3)
EGFR: 107 ML/MIN/1.73M2 — SIGNIFICANT CHANGE UP
EGFR: 107 ML/MIN/1.73M2 — SIGNIFICANT CHANGE UP
GALACTOMANNAN AG SERPL-ACNC: 0.04 INDEX — SIGNIFICANT CHANGE UP (ref 0–0.49)
GLUCOSE SERPL-MCNC: 109 MG/DL — HIGH (ref 70–99)
HCT VFR BLD CALC: 34.1 % — LOW (ref 34.5–45)
HGB BLD-MCNC: 11.2 G/DL — LOW (ref 11.5–15.5)
MCHC RBC-ENTMCNC: 29.7 PG — SIGNIFICANT CHANGE UP (ref 27–34)
MCHC RBC-ENTMCNC: 32.8 G/DL — SIGNIFICANT CHANGE UP (ref 32–36)
MCV RBC AUTO: 90.5 FL — SIGNIFICANT CHANGE UP (ref 80–100)
NRBC BLD AUTO-RTO: 0 /100 WBCS — SIGNIFICANT CHANGE UP (ref 0–0)
PLATELET # BLD AUTO: 374 K/UL — SIGNIFICANT CHANGE UP (ref 150–400)
POTASSIUM SERPL-MCNC: 4.7 MMOL/L — SIGNIFICANT CHANGE UP (ref 3.5–5.3)
POTASSIUM SERPL-SCNC: 4.7 MMOL/L — SIGNIFICANT CHANGE UP (ref 3.5–5.3)
RBC # BLD: 3.77 M/UL — LOW (ref 3.8–5.2)
RBC # FLD: 13.2 % — SIGNIFICANT CHANGE UP (ref 10.3–14.5)
SODIUM SERPL-SCNC: 136 MMOL/L — SIGNIFICANT CHANGE UP (ref 135–145)
WBC # BLD: 12.31 K/UL — HIGH (ref 3.8–10.5)
WBC # FLD AUTO: 12.31 K/UL — HIGH (ref 3.8–10.5)

## 2025-06-06 PROCEDURE — 76937 US GUIDE VASCULAR ACCESS: CPT | Mod: 26,59

## 2025-06-06 PROCEDURE — 99233 SBSQ HOSP IP/OBS HIGH 50: CPT

## 2025-06-06 PROCEDURE — 36573 INSJ PICC RS&I 5 YR+: CPT

## 2025-06-06 RX ORDER — KETOROLAC TROMETHAMINE 30 MG/ML
15 INJECTION, SOLUTION INTRAMUSCULAR; INTRAVENOUS EVERY 8 HOURS
Refills: 0 | Status: DISCONTINUED | OUTPATIENT
Start: 2025-06-06 | End: 2025-06-07

## 2025-06-06 RX ORDER — DAPTOMYCIN 500 MG/10ML
500 INJECTION, POWDER, LYOPHILIZED, FOR SOLUTION INTRAVENOUS EVERY 24 HOURS
Refills: 0 | Status: DISCONTINUED | OUTPATIENT
Start: 2025-06-06 | End: 2025-06-06

## 2025-06-06 RX ORDER — GABAPENTIN 400 MG/1
100 CAPSULE ORAL THREE TIMES A DAY
Refills: 0 | Status: DISCONTINUED | OUTPATIENT
Start: 2025-06-06 | End: 2025-06-07

## 2025-06-06 RX ORDER — DAPTOMYCIN 500 MG/10ML
400 INJECTION, POWDER, LYOPHILIZED, FOR SOLUTION INTRAVENOUS EVERY 24 HOURS
Refills: 0 | Status: DISCONTINUED | OUTPATIENT
Start: 2025-06-06 | End: 2025-06-11

## 2025-06-06 RX ADMIN — IPRATROPIUM BROMIDE AND ALBUTEROL SULFATE 3 MILLILITER(S): .5; 2.5 SOLUTION RESPIRATORY (INHALATION) at 20:54

## 2025-06-06 RX ADMIN — Medication 2 MILLIGRAM(S): at 22:29

## 2025-06-06 RX ADMIN — KETOROLAC TROMETHAMINE 15 MILLIGRAM(S): 30 INJECTION, SOLUTION INTRAMUSCULAR; INTRAVENOUS at 22:29

## 2025-06-06 RX ADMIN — Medication 0.5 MILLIGRAM(S): at 20:35

## 2025-06-06 RX ADMIN — Medication 40 MILLIGRAM(S): at 06:51

## 2025-06-06 RX ADMIN — DAPTOMYCIN 116 MILLIGRAM(S): 500 INJECTION, POWDER, LYOPHILIZED, FOR SOLUTION INTRAVENOUS at 15:57

## 2025-06-06 RX ADMIN — IPRATROPIUM BROMIDE AND ALBUTEROL SULFATE 3 MILLILITER(S): .5; 2.5 SOLUTION RESPIRATORY (INHALATION) at 15:35

## 2025-06-06 RX ADMIN — Medication 250 MILLIGRAM(S): at 05:19

## 2025-06-06 RX ADMIN — KETOROLAC TROMETHAMINE 15 MILLIGRAM(S): 30 INJECTION, SOLUTION INTRAMUSCULAR; INTRAVENOUS at 06:41

## 2025-06-06 RX ADMIN — Medication 2 MILLIGRAM(S): at 02:49

## 2025-06-06 RX ADMIN — ENOXAPARIN SODIUM 40 MILLIGRAM(S): 100 INJECTION SUBCUTANEOUS at 11:44

## 2025-06-06 RX ADMIN — GABAPENTIN 100 MILLIGRAM(S): 400 CAPSULE ORAL at 05:19

## 2025-06-06 RX ADMIN — GABAPENTIN 100 MILLIGRAM(S): 400 CAPSULE ORAL at 14:53

## 2025-06-06 RX ADMIN — KETOROLAC TROMETHAMINE 15 MILLIGRAM(S): 30 INJECTION, SOLUTION INTRAMUSCULAR; INTRAVENOUS at 21:41

## 2025-06-06 RX ADMIN — Medication 2 MILLIGRAM(S): at 10:05

## 2025-06-06 RX ADMIN — Medication 0.5 MILLIGRAM(S): at 08:35

## 2025-06-06 RX ADMIN — KETOROLAC TROMETHAMINE 15 MILLIGRAM(S): 30 INJECTION, SOLUTION INTRAMUSCULAR; INTRAVENOUS at 05:19

## 2025-06-06 RX ADMIN — Medication 0.5 MILLIGRAM(S): at 21:31

## 2025-06-06 RX ADMIN — Medication 0.5 MILLIGRAM(S): at 09:07

## 2025-06-06 RX ADMIN — Medication 2 MILLIGRAM(S): at 06:41

## 2025-06-06 RX ADMIN — Medication 2 MILLIGRAM(S): at 02:19

## 2025-06-06 RX ADMIN — Medication 30 MILLILITER(S): at 02:19

## 2025-06-06 RX ADMIN — METHYLPREDNISOLONE ACETATE 40 MILLIGRAM(S): 80 INJECTION, SUSPENSION INTRA-ARTICULAR; INTRALESIONAL; INTRAMUSCULAR; SOFT TISSUE at 05:19

## 2025-06-06 RX ADMIN — Medication 0.5 MILLIGRAM(S): at 15:56

## 2025-06-06 RX ADMIN — Medication 0.5 MILLIGRAM(S): at 02:19

## 2025-06-06 RX ADMIN — Medication 2 MILLIGRAM(S): at 18:05

## 2025-06-06 RX ADMIN — Medication 2 MILLIGRAM(S): at 14:53

## 2025-06-06 RX ADMIN — Medication 0.5 MILLIGRAM(S): at 02:57

## 2025-06-06 RX ADMIN — Medication 650 MILLIGRAM(S): at 21:30

## 2025-06-06 RX ADMIN — CEFTRIAXONE 100 MILLIGRAM(S): 500 INJECTION, POWDER, FOR SOLUTION INTRAMUSCULAR; INTRAVENOUS at 14:54

## 2025-06-06 RX ADMIN — GABAPENTIN 100 MILLIGRAM(S): 400 CAPSULE ORAL at 21:41

## 2025-06-06 RX ADMIN — Medication 0.5 MILLIGRAM(S): at 16:50

## 2025-06-06 RX ADMIN — IPRATROPIUM BROMIDE AND ALBUTEROL SULFATE 3 MILLILITER(S): .5; 2.5 SOLUTION RESPIRATORY (INHALATION) at 00:49

## 2025-06-06 RX ADMIN — Medication 150 MILLIGRAM(S): at 21:41

## 2025-06-06 RX ADMIN — IPRATROPIUM BROMIDE AND ALBUTEROL SULFATE 3 MILLILITER(S): .5; 2.5 SOLUTION RESPIRATORY (INHALATION) at 07:41

## 2025-06-06 RX ADMIN — Medication 2 MILLIGRAM(S): at 05:19

## 2025-06-06 RX ADMIN — Medication 650 MILLIGRAM(S): at 19:39

## 2025-06-06 RX ADMIN — Medication 2 MILLIGRAM(S): at 21:40

## 2025-06-06 NOTE — PROGRESS NOTE ADULT - PROBLEM SELECTOR PLAN 5
Hgb 11.4 on admission. Baseline around 11.6  - Likely anemia of chronic dz 2/2 vasculitis   - No signs or symptoms of acute bleeding  - Trend H/H Hgb 11.4 on admission. Baseline around 11.6  - hgb stable

## 2025-06-06 NOTE — PROGRESS NOTE ADULT - SUBJECTIVE AND OBJECTIVE BOX
[facility]  INFECTIOUS DISEASES - [address], [place] [address]  Tel: [phone]     Fax: [phone]  =======================================================    SONU JUDD [id number]    Follow up: No fevers. Seen earlier today. Pain around nose area a little better.    Allergies:  sulfa drugs (Hives; Rash)  sulfa drugs (Hives; Urticaria)      Antibiotics:  acetaminophen     Tablet .. 650 milliGRAM(s) Oral every 6 hours PRN  albuterol    90 MICROgram(s) HFA Inhaler 2 Puff(s) Inhalation every 6 hours PRN  albuterol/ipratropium for Nebulization 3 milliLiter(s) Nebulizer every 6 hours  aluminum hydroxide/magnesium hydroxide/simethicone Suspension 30 milliLiter(s) Oral every 4 hours PRN  cefTRIAXone   IVPB 1000 milliGRAM(s) IV Intermittent every 24 hours  chlorhexidine 4% Liquid 1 Application(s) Topical <User Schedule>  DAPTOmycin IVPB 400 milliGRAM(s) IV Intermittent every 24 hours  enoxaparin Injectable 40 milliGRAM(s) SubCutaneous every 24 hours  gabapentin 100 milliGRAM(s) Oral three times a day  hydrocortisone 1% Cream 1 Application(s) Topical three times a day  HYDROmorphone   Tablet 2 milliGRAM(s) Oral every 4 hours  HYDROmorphone  Injectable 0.5 milliGRAM(s) IV Push every 4 hours PRN  ketorolac   Injectable 15 milliGRAM(s) IV Push every 8 hours  melatonin 3 milliGRAM(s) Oral at bedtime PRN  methylPREDNISolone sodium succinate Injectable 40 milliGRAM(s) IV Push daily  ondansetron Injectable 4 milliGRAM(s) IV Push every 8 hours PRN  pantoprazole    Tablet 40 milliGRAM(s) Oral before breakfast  sodium chloride 0.9% lock flush 10 milliLiter(s) IV Push every 1 hour PRN  traZODone 150 milliGRAM(s) Oral at bedtime       REVIEW OF SYSTEMS:  CONSTITUTIONAL:  No Fever or chills  HEENT:  see history  CARDIOVASCULAR:  No chest pain or SOB  RESPIRATORY:  No cough, shortness of breath  GASTROINTESTINAL:  No nausea, vomiting or diarrhea.  GENITOURINARY:  No dysuria  MUSCULOSKELETAL:  no joint aches, no muscle pain  NEUROLOGIC:  No headache or dizziness  PSYCHIATRIC:  No disorder of thought or mood.     Physical Exam:  ICU Vital Signs Last 24 Hrs  T(C): 36.6 (07 Jun 2025 04:58), Max: 36.7 (06 Jun 2025 22:50)  T(F): 97.9 (07 Jun 2025 04:58), Max: 98.1 (06 Jun 2025 22:50)  HR: 76 (07 Jun 2025 04:58) (72 - 87)  BP: 101/66 (07 Jun 2025 04:58) (101/66 - 126/82)  BP(mean): --  ABP: --  ABP(mean): --  RR: 18 (07 Jun 2025 04:58) (18 - 18)  SpO2: 91% (07 Jun 2025 04:58) (91% - 97%)    O2 Parameters below as of 07 Jun 2025 04:58  Patient On (Oxygen Delivery Method): room air           GEN: NAD  HEENT: normocephalic and atraumatic.no drainage from nose, swelling decreased  NECK: Supple.   LUNGS: Normal respiratory effort  HEART: Regular rate and rhythm   ABDOMEN: Soft, nontender, and nondistended.    EXTREMITIES: No leg edema.  NEUROLOGIC: grossly intact.  PSYCHIATRIC: Appropriate affect .      Labs:  06-06    136  |  102  |  29[H]  ----------------------------<  109[H]  4.7   |  28  |  0.63    Ca    8.7      06 Jun 2025 06:38  Phos  3.2     06-05  Mg     2.3     06-05    TPro  7.0  /  Alb  3.2[L]  /  TBili  0.3  /  DBili  x   /  AST  17  /  ALT  51  /  AlkPhos  58  06-05                          11.2   12.31 )-----------( 374      ( 06 Jun 2025 06:38 )             34.1       Urinalysis Basic - ( 06 Jun 2025 06:38 )    Color: x / Appearance: x / SG: x / pH: x  Gluc: 109 mg/dL / Ketone: x  / Bili: x / Urobili: x   Blood: x / Protein: x / Nitrite: x   Leuk Esterase: x / RBC: x / WBC x   Sq Epi: x / Non Sq Epi: x / Bacteria: x      LIVER FUNCTIONS - ( 05 Jun 2025 07:42 )  Alb: 3.2 g/dL / Pro: 7.0 g/dL / ALK PHOS: 58 U/L / ALT: 51 U/L / AST: 17 U/L / GGT: x             RECENT CULTURES:  06-03 @ 16:31 Blood Blood     No growth at 72 Hours        06-03 @ 16:20 Blood Blood     No growth at 72 Hours              All imaging and data are reviewed.

## 2025-06-06 NOTE — SOCIAL WORK PROGRESS NOTE - NSSWPROGRESSNOTE_GEN_ALL_CORE
No anticipated weekend d/c at this time. Pt needs picc for IV abx likely next week. Pending accepting [facility] facility and auth. Pending medical clearance. SW to follow and remain available for any needs.

## 2025-06-06 NOTE — PROGRESS NOTE ADULT - SUBJECTIVE AND OBJECTIVE BOX
Patient is a 54y old  Female who presents with a chief complaint of Sinusitis, pain management (2025 22:42)      FROM ADMISSION H+P:   HPI:  55yo F with PMHx of asthma/COPD (no home O2), vasculitis presents to the ED with persistent severe diffuse facial pain, nasal edema, associated with thick mucous nasal discharge x1.5 week. Patient describes her facial pain as deep bone pain and soft tissue pain, stating she feels pain starts in her nose and radiates to her gums. Patient states she feels this is vasculitis flare-up and feels similar to her only prior flare-up 3 months ago. Patient has been using daily nasal saline as recommended. Most recently, she went to Urgent Care 5 days ago for the pain, where she was prescribed doxycycline 100mg BID (completed 5-days of doxy so far). Patient admits to trying to remove scabs in nares with this flare up. Patient reports dilaudid helped bring pain down from 10/10 to 6/10, but pain is creeping up again. She reports she has Hx of asthma and usually uses inhalers PRN not daily, but has been using them 2-3x a day over the past 1.5 wks since the flare-up started. Patient also states she feels like is having discomfort with swallowing, intermittent blurry vision associated with the flare-ups; denies blurry vision at this time. She endorses occasional focal rashes, palpitations in the past, but denies any rashes at this time. Endorses diarrhea yesterday evening; denies fever, chills, nausea, vomiting. Patient follows with Rheum, ENT for recently diagnosed vasculitis; currently, workup in progress with Rheum to determine which kind; she states that her last visit was 1-2 mo ago. Patient endorses FHx of autoimmune conditions; she states her daughter is currently undergoing workup for SLE.    Denies chest pain, SOB, cough, abdominal pain, nausea, vomiting, urinary frequency, urgency, dysuria, changes in vision, dizziness, numbness, tingling.    Denies recent international travel or sick contacts.    ED Course:   Vitals: BP: 129/85 , HR: 88 , Temp: 98.8, RR: 20 , SpO2: 94% on RA   Labs:   WBCs 9.59, H/H 11.4/34.1, BUN/Cr 17/0.67, Na 142, K 3.3  CT maxillofacial w/ IV cont:   1.  Chronic perforation of the nasal septum, with erosion of the left   inferior turbinate.  2.  Chronic erosive changes in the left hard palate, with improved   phlegmon in the soft palate.  3.  Correlate clinically to exclude chronic osteomyelitis.  4.  Consider maxillofacial MRI with gadolinium.    Received in the ED: Augmentin 875mg PO x1, Solumedrol 125mg IVP x1, dilaudid 1mg IVP x1, morphine 4mg IVP x1, Percocet 5mg-325mg PO x1, Mg 2g x1, KCl 40 mEq x1, Duonebs x1   (2025 00:33)      INTERVAL HPI/OVERNIGHT EVENTS: Patient was seen and examined. No acute events occurred overnight. No new complaints.    I&O's Summary    2025 07:01  -  2025 07:00  --------------------------------------------------------  IN: 1025 mL / OUT: 0 mL / NET: 1025 mL    2025 07:01  -  2025 18:25  --------------------------------------------------------  IN: 950 mL / OUT: 0 mL / NET: 950 mL        PAST MEDICAL & SURGICAL HISTORY:  Asthma      Menorrhagia      Fibroids  uterine      ADHD (attention deficit hyperactivity disorder)      Obesity      Asthma      Perforated nasal septum      ADHD      S/P tubal ligation      S/P  section        S/P tonsillectomy      S/P endometrial ablation            LABS:                        11.2   12.31 )-----------( 374      ( 2025 06:38 )             34.1     -    136  |  102  |  29[H]  ----------------------------<  109[H]  4.7   |  28  |  0.63    Ca    8.7      2025 06:38  Phos  3.2     06-  Mg     2.3     -    TPro  7.0  /  Alb  3.2[L]  /  TBili  0.3  /  DBili  x   /  AST  17  /  ALT  51  /  AlkPhos  58  -      Urinalysis Basic - ( 2025 06:38 )    Color: x / Appearance: x / SG: x / pH: x  Gluc: 109 mg/dL / Ketone: x  / Bili: x / Urobili: x   Blood: x / Protein: x / Nitrite: x   Leuk Esterase: x / RBC: x / WBC x   Sq Epi: x / Non Sq Epi: x / Bacteria: x      CAPILLARY BLOOD GLUCOSE            Urinalysis Basic - ( 2025 06:38 )    Color: x / Appearance: x / SG: x / pH: x  Gluc: 109 mg/dL / Ketone: x  / Bili: x / Urobili: x   Blood: x / Protein: x / Nitrite: x   Leuk Esterase: x / RBC: x / WBC x   Sq Epi: x / Non Sq Epi: x / Bacteria: x        MEDICATIONS  (STANDING):  albuterol/ipratropium for Nebulization 3 milliLiter(s) Nebulizer every 6 hours  cefTRIAXone   IVPB 1000 milliGRAM(s) IV Intermittent every 24 hours  chlorhexidine 4% Liquid 1 Application(s) Topical <User Schedule>  DAPTOmycin IVPB 400 milliGRAM(s) IV Intermittent every 24 hours  enoxaparin Injectable 40 milliGRAM(s) SubCutaneous every 24 hours  gabapentin 100 milliGRAM(s) Oral three times a day  HYDROmorphone   Tablet 2 milliGRAM(s) Oral every 4 hours  methylPREDNISolone sodium succinate Injectable 40 milliGRAM(s) IV Push daily  pantoprazole    Tablet 40 milliGRAM(s) Oral before breakfast  traZODone 150 milliGRAM(s) Oral at bedtime    MEDICATIONS  (PRN):  acetaminophen     Tablet .. 650 milliGRAM(s) Oral every 6 hours PRN Temp greater or equal to 38C (100.4F), Mild Pain (1 - 3)  albuterol    90 MICROgram(s) HFA Inhaler 2 Puff(s) Inhalation every 6 hours PRN Bronchospasm  aluminum hydroxide/magnesium hydroxide/simethicone Suspension 30 milliLiter(s) Oral every 4 hours PRN Dyspepsia  HYDROmorphone  Injectable 0.5 milliGRAM(s) IV Push every 4 hours PRN Severe Pain (7 - 10)  melatonin 3 milliGRAM(s) Oral at bedtime PRN Insomnia  ondansetron Injectable 4 milliGRAM(s) IV Push every 8 hours PRN Nausea and/or Vomiting  sodium chloride 0.9% lock flush 10 milliLiter(s) IV Push every 1 hour PRN Pre/post blood products, medications, blood draw, and to maintain line patency      REVIEW OF SYSTEMS:  CONSTITUTIONAL: No fever or chills  HEENT:  No headache, no sore throat  RESPIRATORY: No cough, wheezing, or shortness of breath  CARDIOVASCULAR: No chest pain, palpitations  GASTROINTESTINAL: No abdominal pain, nausea, vomiting, or diarrhea  GENITOURINARY: No dysuria, frequency, or hematuria  NEUROLOGICAL: no focal weakness or dizziness  MUSCULOSKELETAL: no myalgias  PSYCH: no recent changes in mood    RADIOLOGY & ADDITIONAL TESTS:    Imaging Personally Reviewed:  [x] YES  [ ] NO    Consultant(s) Notes Reviewed:  [x] YES  [ ] NO    PHYSICAL EXAM:  T(C): 36.4 (25 @ 12:27), Max: 36.7 (25 @ 04:47)  HR: 82 (25 @ 15:35) (72 - 87)  BP: 113/75 (25 @ 12:27) (102/71 - 113/75)  RR: 18 (25 @ 12:27) (18 - 18)  SpO2: 93% (25 @ 15:35) (93% - 96%)    GENERAL: NAD, well-developed, well-groomed  HEENT:  anicteric, moist mucous membranes  CHEST/LUNG:  CTA b/l, no rales, wheezes, or rhonchi  HEART:  RRR, S1, S2  ABDOMEN:  BS+, soft, nontender, nondistended  EXTREMITIES: no edema, cyanosis, or calf tenderness  NERVOUS SYSTEM: answers questions and follows commands appropriately  PSYCH: normal affect    Care Discussed with Consultants/Other Providers [x] YES  [ ] NO Patient is a 54y old  Female who presents with a chief complaint of Sinusitis, pain management (2025 22:42)      FROM ADMISSION H+P:   HPI:  53yo F with PMHx of asthma/COPD (no home O2), vasculitis presents to the ED with persistent severe diffuse facial pain, nasal edema, associated with thick mucous nasal discharge x1.5 week. Patient describes her facial pain as deep bone pain and soft tissue pain, stating she feels pain starts in her nose and radiates to her gums. Patient states she feels this is vasculitis flare-up and feels similar to her only prior flare-up 3 months ago. Patient has been using daily nasal saline as recommended. Most recently, she went to Urgent Care 5 days ago for the pain, where she was prescribed doxycycline 100mg BID (completed 5-days of doxy so far). Patient admits to trying to remove scabs in nares with this flare up. Patient reports dilaudid helped bring pain down from 10/10 to 6/10, but pain is creeping up again. She reports she has Hx of asthma and usually uses inhalers PRN not daily, but has been using them 2-3x a day over the past 1.5 wks since the flare-up started. Patient also states she feels like is having discomfort with swallowing, intermittent blurry vision associated with the flare-ups; denies blurry vision at this time. She endorses occasional focal rashes, palpitations in the past, but denies any rashes at this time. Endorses diarrhea yesterday evening; denies fever, chills, nausea, vomiting. Patient follows with Rheum, ENT for recently diagnosed vasculitis; currently, workup in progress with Rheum to determine which kind; she states that her last visit was 1-2 mo ago. Patient endorses FHx of autoimmune conditions; she states her daughter is currently undergoing workup for SLE.    Denies chest pain, SOB, cough, abdominal pain, nausea, vomiting, urinary frequency, urgency, dysuria, changes in vision, dizziness, numbness, tingling.    Denies recent international travel or sick contacts.    ED Course:   Vitals: BP: 129/85 , HR: 88 , Temp: 98.8, RR: 20 , SpO2: 94% on RA   Labs:   WBCs 9.59, H/H 11.4/34.1, BUN/Cr 17/0.67, Na 142, K 3.3  CT maxillofacial w/ IV cont:   1.  Chronic perforation of the nasal septum, with erosion of the left   inferior turbinate.  2.  Chronic erosive changes in the left hard palate, with improved   phlegmon in the soft palate.  3.  Correlate clinically to exclude chronic osteomyelitis.  4.  Consider maxillofacial MRI with gadolinium.    Received in the ED: Augmentin 875mg PO x1, Solumedrol 125mg IVP x1, dilaudid 1mg IVP x1, morphine 4mg IVP x1, Percocet 5mg-325mg PO x1, Mg 2g x1, KCl 40 mEq x1, Duonebs x1   (2025 00:33)      INTERVAL HPI/OVERNIGHT EVENTS: Patient was seen and examined. No acute events occurred overnight. No new complaints. Pain still there. States that she doesnt know if toradol is helping - although per MAR, requiring less IV PRNs. Thinks back and states that she was actually able to sleep last night a bit. Got PICC line today and feels better. Tolerating ABX.    I&O's Summary    2025 07:01  -  2025 07:00  --------------------------------------------------------  IN: 1025 mL / OUT: 0 mL / NET: 1025 mL    2025 07:01  -  2025 18:25  --------------------------------------------------------  IN: 950 mL / OUT: 0 mL / NET: 950 mL        PAST MEDICAL & SURGICAL HISTORY:  Asthma      Menorrhagia      Fibroids  uterine      ADHD (attention deficit hyperactivity disorder)      Obesity      Asthma      Perforated nasal septum      ADHD      S/P tubal ligation      S/P  section        S/P tonsillectomy      S/P endometrial ablation            LABS:                        11.2   12.31 )-----------( 374      ( 2025 06:38 )             34.1         136  |  102  |  29[H]  ----------------------------<  109[H]  4.7   |  28  |  0.63    Ca    8.7      2025 06:38  Phos  3.2     06-  Mg     2.3     -    TPro  7.0  /  Alb  3.2[L]  /  TBili  0.3  /  DBili  x   /  AST  17  /  ALT  51  /  AlkPhos  58  06-      Urinalysis Basic - ( 2025 06:38 )    Color: x / Appearance: x / SG: x / pH: x  Gluc: 109 mg/dL / Ketone: x  / Bili: x / Urobili: x   Blood: x / Protein: x / Nitrite: x   Leuk Esterase: x / RBC: x / WBC x   Sq Epi: x / Non Sq Epi: x / Bacteria: x      CAPILLARY BLOOD GLUCOSE            Urinalysis Basic - ( 2025 06:38 )    Color: x / Appearance: x / SG: x / pH: x  Gluc: 109 mg/dL / Ketone: x  / Bili: x / Urobili: x   Blood: x / Protein: x / Nitrite: x   Leuk Esterase: x / RBC: x / WBC x   Sq Epi: x / Non Sq Epi: x / Bacteria: x        MEDICATIONS  (STANDING):  albuterol/ipratropium for Nebulization 3 milliLiter(s) Nebulizer every 6 hours  cefTRIAXone   IVPB 1000 milliGRAM(s) IV Intermittent every 24 hours  chlorhexidine 4% Liquid 1 Application(s) Topical <User Schedule>  DAPTOmycin IVPB 400 milliGRAM(s) IV Intermittent every 24 hours  enoxaparin Injectable 40 milliGRAM(s) SubCutaneous every 24 hours  gabapentin 100 milliGRAM(s) Oral three times a day  HYDROmorphone   Tablet 2 milliGRAM(s) Oral every 4 hours  methylPREDNISolone sodium succinate Injectable 40 milliGRAM(s) IV Push daily  pantoprazole    Tablet 40 milliGRAM(s) Oral before breakfast  traZODone 150 milliGRAM(s) Oral at bedtime    MEDICATIONS  (PRN):  acetaminophen     Tablet .. 650 milliGRAM(s) Oral every 6 hours PRN Temp greater or equal to 38C (100.4F), Mild Pain (1 - 3)  albuterol    90 MICROgram(s) HFA Inhaler 2 Puff(s) Inhalation every 6 hours PRN Bronchospasm  aluminum hydroxide/magnesium hydroxide/simethicone Suspension 30 milliLiter(s) Oral every 4 hours PRN Dyspepsia  HYDROmorphone  Injectable 0.5 milliGRAM(s) IV Push every 4 hours PRN Severe Pain (7 - 10)  melatonin 3 milliGRAM(s) Oral at bedtime PRN Insomnia  ondansetron Injectable 4 milliGRAM(s) IV Push every 8 hours PRN Nausea and/or Vomiting  sodium chloride 0.9% lock flush 10 milliLiter(s) IV Push every 1 hour PRN Pre/post blood products, medications, blood draw, and to maintain line patency      REVIEW OF SYSTEMS:  CONSTITUTIONAL: No fever or chills  HEENT:  No headache, no sore throat; +nasal pain  RESPIRATORY: No cough, wheezing, or shortness of breath  CARDIOVASCULAR: No chest pain, palpitations  GASTROINTESTINAL: No abdominal pain, nausea, vomiting, or diarrhea  GENITOURINARY: No dysuria, frequency, or hematuria  NEUROLOGICAL: no focal weakness or dizziness  MUSCULOSKELETAL: no myalgias  PSYCH: no recent changes in mood    RADIOLOGY & ADDITIONAL TESTS:    Imaging Personally Reviewed:  [x] YES  [ ] NO    Consultant(s) Notes Reviewed:  [x] YES  [ ] NO    PHYSICAL EXAM:  T(C): 36.4 (25 @ 12:27), Max: 36.7 (25 @ 04:47)  HR: 82 (25 @ 15:35) (72 - 87)  BP: 113/75 (25 @ 12:27) (102/71 - 113/75)  RR: 18 (25 @ 12:27) (18 - 18)  SpO2: 93% (25 @ 15:35) (93% - 96%)    GENERAL: NAD  HEENT:  anicteric, moist mucous membranes  CHEST/LUNG:  CTA b/l, no rales, wheezes, or rhonchi  HEART:  RRR, S1, S2  ABDOMEN:  BS+, soft, nontender, nondistended  EXTREMITIES: no edema, cyanosis, or calf tenderness; RUE PICC  NERVOUS SYSTEM: answers questions and follows commands appropriately  PSYCH: normal affect    Care Discussed with Consultants/Other Providers [x] YES  [ ] NO

## 2025-06-06 NOTE — PROGRESS NOTE ADULT - ASSESSMENT
53yo F with PMHx of asthma/COPD (no home O2), vasculitis, who presents with worsening facial pain and swelling. Symptoms started about 5 months ago with non healing cut on L nares. She said she went to urgent care and diagnosed with MRSA infection. She saw ENT (Dr. Kris Monsalve) 2 months and Rheum (Dr. Luis Manuel Posadas, [facility]) and was diagnosed with vasculitis. She said she had similar episode about 2 months ago and was treated for vasculitis "flare" with oral antibiotics and prednisone.    Patient being treated for sinusitis, but concern for underlying chronic process. Seen by ENT and concern  for chronic sinusitis, rhinitis septal perforation of vascular pathology or [place]ers. ? if any superimposed infection, although CRP low at 10-->8 so suspect more chronic process.     However given concern for osteomyelitis on left hard palate on MRI, will plan for 6 week course of IV antibiotics. No plan for further intervention per ENT. MRSA nasal PCR positive. WBC increased to 12 today but probably steroid related. Serum Fungitell and galactomannan low. Blood cultures remain no growth. Plan for discharge to rehab.    #Sinusitis  #Osteomyelitis of L hard palate  #Chronic perforation of nasal septum  #MRSA nasal colonization    -switch vancomycin to daptomycin 400mg IV daily until July 15  -continue ceftriaxone 1g IV q24h until July 15  -needs PICC  -weekly CBC, CMP, CPK, CRP on discharge  -baseline CPK in AM  -monitor WBC  -follow cultures to completion  -appreciate ENT input  -ID and ENT follow up outpatient  -discussed with Dr. Concepcion  -I will be covered by Dr. Stefano Guzman this weekend, 6/7-6/8/25    Stephanie Adkins MD  Division of Infectious Diseases   Cell [phone] between 8am and 6pm   After 6pm and weekends please call ID service at [phone].     55 minutes spent on total encounter assessing patient, examination, chart review, counseling and coordinating care by the attending physician/nurse/care manager.

## 2025-06-06 NOTE — PROGRESS NOTE ADULT - PROBLEM SELECTOR PLAN 2
Patient follows with Rheum, ENT for recently diagnosed vasculitis??; currently, workup in progress with Rheum to determine which kind - per rheum message to pt - no definitive evidence of vasculitis - will try to reach o/p md  - Continue outpatient follow up with Rheum, ENT  - Rest of plan as above Patient follows with Rheum, ENT for recently diagnosed vasculitis??; currently, workup in progress with Rheum to determine which kind - per rheum message to pt - no definitive evidence of vasculitis - will try to reach o/p md  - Continue outpatient follow up with Rheum, ENT  - Autoimmune labs sent but reviewed outpt work up  - Rest of plan as above

## 2025-06-06 NOTE — PROCEDURE NOTE - PROCEDURE FINDINGS AND DETAILS
Patient referred to Interventional Radiology for the insertion of a PICC for long term antibiotics for the treatment of sinusitis.    45cm bard single lumen power PICC inserted into patent right basilic vein under sterile conditions and image guidance.  Tip is in the SVC.  PICC can be accessed.

## 2025-06-07 LAB
ANION GAP SERPL CALC-SCNC: 6 MMOL/L — SIGNIFICANT CHANGE UP (ref 5–17)
BUN SERPL-MCNC: 27 MG/DL — HIGH (ref 7–23)
CALCIUM SERPL-MCNC: 9.5 MG/DL — SIGNIFICANT CHANGE UP (ref 8.5–10.1)
CHLORIDE SERPL-SCNC: 100 MMOL/L — SIGNIFICANT CHANGE UP (ref 96–108)
CK SERPL-CCNC: 31 U/L — SIGNIFICANT CHANGE UP (ref 26–192)
CO2 SERPL-SCNC: 31 MMOL/L — SIGNIFICANT CHANGE UP (ref 22–31)
CREAT SERPL-MCNC: 0.67 MG/DL — SIGNIFICANT CHANGE UP (ref 0.5–1.3)
EGFR: 105 ML/MIN/1.73M2 — SIGNIFICANT CHANGE UP
EGFR: 105 ML/MIN/1.73M2 — SIGNIFICANT CHANGE UP
GLUCOSE SERPL-MCNC: 102 MG/DL — HIGH (ref 70–99)
HCT VFR BLD CALC: 33.8 % — LOW (ref 34.5–45)
HGB BLD-MCNC: 11 G/DL — LOW (ref 11.5–15.5)
MAGNESIUM SERPL-MCNC: 2.3 MG/DL — SIGNIFICANT CHANGE UP (ref 1.6–2.6)
MCHC RBC-ENTMCNC: 30 PG — SIGNIFICANT CHANGE UP (ref 27–34)
MCHC RBC-ENTMCNC: 32.5 G/DL — SIGNIFICANT CHANGE UP (ref 32–36)
MCV RBC AUTO: 92.1 FL — SIGNIFICANT CHANGE UP (ref 80–100)
NRBC BLD AUTO-RTO: 0 /100 WBCS — SIGNIFICANT CHANGE UP (ref 0–0)
PHOSPHATE SERPL-MCNC: 6.1 MG/DL — HIGH (ref 2.5–4.5)
PLATELET # BLD AUTO: 351 K/UL — SIGNIFICANT CHANGE UP (ref 150–400)
POTASSIUM SERPL-MCNC: 5 MMOL/L — SIGNIFICANT CHANGE UP (ref 3.5–5.3)
POTASSIUM SERPL-SCNC: 5 MMOL/L — SIGNIFICANT CHANGE UP (ref 3.5–5.3)
RBC # BLD: 3.67 M/UL — LOW (ref 3.8–5.2)
RBC # FLD: 13.7 % — SIGNIFICANT CHANGE UP (ref 10.3–14.5)
SODIUM SERPL-SCNC: 137 MMOL/L — SIGNIFICANT CHANGE UP (ref 135–145)
WBC # BLD: 10.88 K/UL — HIGH (ref 3.8–10.5)
WBC # FLD AUTO: 10.88 K/UL — HIGH (ref 3.8–10.5)

## 2025-06-07 PROCEDURE — 99233 SBSQ HOSP IP/OBS HIGH 50: CPT

## 2025-06-07 RX ORDER — SENNA 187 MG
1 TABLET ORAL ONCE
Refills: 0 | Status: COMPLETED | OUTPATIENT
Start: 2025-06-07 | End: 2025-06-07

## 2025-06-07 RX ORDER — GABAPENTIN 400 MG/1
200 CAPSULE ORAL THREE TIMES A DAY
Refills: 0 | Status: DISCONTINUED | OUTPATIENT
Start: 2025-06-07 | End: 2025-06-08

## 2025-06-07 RX ORDER — POLYETHYLENE GLYCOL 3350 17 G/17G
17 POWDER, FOR SOLUTION ORAL ONCE
Refills: 0 | Status: COMPLETED | OUTPATIENT
Start: 2025-06-07 | End: 2025-06-07

## 2025-06-07 RX ORDER — MUPIROCIN CALCIUM 20 MG/G
1 CREAM TOPICAL
Refills: 0 | Status: DISCONTINUED | OUTPATIENT
Start: 2025-06-07 | End: 2025-06-11

## 2025-06-07 RX ORDER — SODIUM CHLORIDE 0.65 %
2 AEROSOL, SPRAY (ML) NASAL
Refills: 0 | Status: DISCONTINUED | OUTPATIENT
Start: 2025-06-07 | End: 2025-06-11

## 2025-06-07 RX ORDER — KETOROLAC TROMETHAMINE 30 MG/ML
30 INJECTION, SOLUTION INTRAMUSCULAR; INTRAVENOUS EVERY 8 HOURS
Refills: 0 | Status: DISCONTINUED | OUTPATIENT
Start: 2025-06-07 | End: 2025-06-08

## 2025-06-07 RX ORDER — HYDROCORTISONE 10 MG/G
1 CREAM TOPICAL THREE TIMES A DAY
Refills: 0 | Status: DISCONTINUED | OUTPATIENT
Start: 2025-06-07 | End: 2025-06-11

## 2025-06-07 RX ADMIN — Medication 650 MILLIGRAM(S): at 19:58

## 2025-06-07 RX ADMIN — GABAPENTIN 200 MILLIGRAM(S): 400 CAPSULE ORAL at 22:02

## 2025-06-07 RX ADMIN — HYDROCORTISONE 1 APPLICATION(S): 10 CREAM TOPICAL at 14:14

## 2025-06-07 RX ADMIN — Medication 0.5 MILLIGRAM(S): at 19:15

## 2025-06-07 RX ADMIN — ENOXAPARIN SODIUM 40 MILLIGRAM(S): 100 INJECTION SUBCUTANEOUS at 12:25

## 2025-06-07 RX ADMIN — IPRATROPIUM BROMIDE AND ALBUTEROL SULFATE 3 MILLILITER(S): .5; 2.5 SOLUTION RESPIRATORY (INHALATION) at 13:46

## 2025-06-07 RX ADMIN — KETOROLAC TROMETHAMINE 30 MILLIGRAM(S): 30 INJECTION, SOLUTION INTRAMUSCULAR; INTRAVENOUS at 14:05

## 2025-06-07 RX ADMIN — Medication 2 MILLIGRAM(S): at 22:01

## 2025-06-07 RX ADMIN — Medication 0.5 MILLIGRAM(S): at 18:56

## 2025-06-07 RX ADMIN — Medication 2 MILLIGRAM(S): at 14:14

## 2025-06-07 RX ADMIN — GABAPENTIN 100 MILLIGRAM(S): 400 CAPSULE ORAL at 05:20

## 2025-06-07 RX ADMIN — Medication 0.5 MILLIGRAM(S): at 02:00

## 2025-06-07 RX ADMIN — Medication 0.5 MILLIGRAM(S): at 14:23

## 2025-06-07 RX ADMIN — Medication 2 MILLIGRAM(S): at 23:01

## 2025-06-07 RX ADMIN — KETOROLAC TROMETHAMINE 30 MILLIGRAM(S): 30 INJECTION, SOLUTION INTRAMUSCULAR; INTRAVENOUS at 22:01

## 2025-06-07 RX ADMIN — Medication 0.5 MILLIGRAM(S): at 15:12

## 2025-06-07 RX ADMIN — Medication 650 MILLIGRAM(S): at 20:58

## 2025-06-07 RX ADMIN — Medication 2 MILLIGRAM(S): at 18:30

## 2025-06-07 RX ADMIN — Medication 2 MILLIGRAM(S): at 10:14

## 2025-06-07 RX ADMIN — Medication 0.5 MILLIGRAM(S): at 01:35

## 2025-06-07 RX ADMIN — GABAPENTIN 200 MILLIGRAM(S): 400 CAPSULE ORAL at 14:04

## 2025-06-07 RX ADMIN — Medication 0.5 MILLIGRAM(S): at 07:00

## 2025-06-07 RX ADMIN — Medication 150 MILLIGRAM(S): at 22:02

## 2025-06-07 RX ADMIN — Medication 40 MILLIGRAM(S): at 18:21

## 2025-06-07 RX ADMIN — HYDROCORTISONE 1 APPLICATION(S): 10 CREAM TOPICAL at 05:21

## 2025-06-07 RX ADMIN — Medication 2 MILLIGRAM(S): at 14:05

## 2025-06-07 RX ADMIN — Medication 2 MILLIGRAM(S): at 05:20

## 2025-06-07 RX ADMIN — Medication 1 TABLET(S): at 22:02

## 2025-06-07 RX ADMIN — KETOROLAC TROMETHAMINE 30 MILLIGRAM(S): 30 INJECTION, SOLUTION INTRAMUSCULAR; INTRAVENOUS at 14:14

## 2025-06-07 RX ADMIN — Medication 2 MILLIGRAM(S): at 10:53

## 2025-06-07 RX ADMIN — Medication 2 MILLIGRAM(S): at 01:34

## 2025-06-07 RX ADMIN — Medication 2 MILLIGRAM(S): at 07:00

## 2025-06-07 RX ADMIN — IPRATROPIUM BROMIDE AND ALBUTEROL SULFATE 3 MILLILITER(S): .5; 2.5 SOLUTION RESPIRATORY (INHALATION) at 01:10

## 2025-06-07 RX ADMIN — Medication 2 MILLIGRAM(S): at 18:22

## 2025-06-07 RX ADMIN — Medication 40 MILLIGRAM(S): at 06:06

## 2025-06-07 RX ADMIN — METHYLPREDNISOLONE ACETATE 40 MILLIGRAM(S): 80 INJECTION, SUSPENSION INTRA-ARTICULAR; INTRALESIONAL; INTRAMUSCULAR; SOFT TISSUE at 05:20

## 2025-06-07 RX ADMIN — KETOROLAC TROMETHAMINE 15 MILLIGRAM(S): 30 INJECTION, SOLUTION INTRAMUSCULAR; INTRAVENOUS at 05:20

## 2025-06-07 RX ADMIN — CEFTRIAXONE 100 MILLIGRAM(S): 500 INJECTION, POWDER, FOR SOLUTION INTRAMUSCULAR; INTRAVENOUS at 15:18

## 2025-06-07 RX ADMIN — DAPTOMYCIN 116 MILLIGRAM(S): 500 INJECTION, POWDER, LYOPHILIZED, FOR SOLUTION INTRAVENOUS at 15:58

## 2025-06-07 RX ADMIN — KETOROLAC TROMETHAMINE 15 MILLIGRAM(S): 30 INJECTION, SOLUTION INTRAMUSCULAR; INTRAVENOUS at 07:00

## 2025-06-07 RX ADMIN — Medication 1 APPLICATION(S): at 05:21

## 2025-06-07 RX ADMIN — Medication 0.5 MILLIGRAM(S): at 10:14

## 2025-06-07 RX ADMIN — IPRATROPIUM BROMIDE AND ALBUTEROL SULFATE 3 MILLILITER(S): .5; 2.5 SOLUTION RESPIRATORY (INHALATION) at 19:53

## 2025-06-07 RX ADMIN — KETOROLAC TROMETHAMINE 30 MILLIGRAM(S): 30 INJECTION, SOLUTION INTRAMUSCULAR; INTRAVENOUS at 23:01

## 2025-06-07 RX ADMIN — HYDROCORTISONE 1 APPLICATION(S): 10 CREAM TOPICAL at 22:01

## 2025-06-07 RX ADMIN — Medication 0.5 MILLIGRAM(S): at 10:53

## 2025-06-07 RX ADMIN — POLYETHYLENE GLYCOL 3350 17 GRAM(S): 17 POWDER, FOR SOLUTION ORAL at 22:01

## 2025-06-07 RX ADMIN — MUPIROCIN CALCIUM 1 APPLICATION(S): 20 CREAM TOPICAL at 15:11

## 2025-06-07 RX ADMIN — Medication 0.5 MILLIGRAM(S): at 06:07

## 2025-06-07 RX ADMIN — IPRATROPIUM BROMIDE AND ALBUTEROL SULFATE 3 MILLILITER(S): .5; 2.5 SOLUTION RESPIRATORY (INHALATION) at 07:25

## 2025-06-07 NOTE — PROGRESS NOTE ADULT - ASSESSMENT
55yo F with PMHx of asthma/COPD (no home O2), vasculitis presents to the ED with persistent severe diffuse facial pain, nasal edema, associated with thick mucous nasal discharge x1.5 week. Admitted for sinusitis, pain management.

## 2025-06-07 NOTE — PROGRESS NOTE ADULT - PROBLEM SELECTOR PLAN 2
Patient follows with Rheum, ENT for recently diagnosed vasculitis??; currently, workup in progress with Rheum to determine which kind - per rheum message to pt - no definitive evidence of vasculitis - will try to reach o/p md  - Continue outpatient follow up with Rheum, ENT  - Autoimmune labs sent but reviewed outpt work up  - Rest of plan as above

## 2025-06-07 NOTE — PROGRESS NOTE ADULT - SUBJECTIVE AND OBJECTIVE BOX
Patient is a 54y old  Female who presents with a chief complaint of Sinusitis, pain management (2025 22:19)      FROM ADMISSION H+P:   HPI:  55yo F with PMHx of asthma/COPD (no home O2), vasculitis presents to the ED with persistent severe diffuse facial pain, nasal edema, associated with thick mucous nasal discharge x1.5 week. Patient describes her facial pain as deep bone pain and soft tissue pain, stating she feels pain starts in her nose and radiates to her gums. Patient states she feels this is vasculitis flare-up and feels similar to her only prior flare-up 3 months ago. Patient has been using daily nasal saline as recommended. Most recently, she went to Urgent Care 5 days ago for the pain, where she was prescribed doxycycline 100mg BID (completed 5-days of doxy so far). Patient admits to trying to remove scabs in nares with this flare up. Patient reports dilaudid helped bring pain down from 10/10 to 6/10, but pain is creeping up again. She reports she has Hx of asthma and usually uses inhalers PRN not daily, but has been using them 2-3x a day over the past 1.5 wks since the flare-up started. Patient also states she feels like is having discomfort with swallowing, intermittent blurry vision associated with the flare-ups; denies blurry vision at this time. She endorses occasional focal rashes, palpitations in the past, but denies any rashes at this time. Endorses diarrhea yesterday evening; denies fever, chills, nausea, vomiting. Patient follows with Rheum, ENT for recently diagnosed vasculitis; currently, workup in progress with Rheum to determine which kind; she states that her last visit was 1-2 mo ago. Patient endorses FHx of autoimmune conditions; she states her daughter is currently undergoing workup for SLE.    Denies chest pain, SOB, cough, abdominal pain, nausea, vomiting, urinary frequency, urgency, dysuria, changes in vision, dizziness, numbness, tingling.    Denies recent international travel or sick contacts.    ED Course:   Vitals: BP: 129/85 , HR: 88 , Temp: 98.8, RR: 20 , SpO2: 94% on RA   Labs:   WBCs 9.59, H/H 11.4/34.1, BUN/Cr 17/0.67, Na 142, K 3.3  CT maxillofacial w/ IV cont:   1.  Chronic perforation of the nasal septum, with erosion of the left   inferior turbinate.  2.  Chronic erosive changes in the left hard palate, with improved   phlegmon in the soft palate.  3.  Correlate clinically to exclude chronic osteomyelitis.  4.  Consider maxillofacial MRI with gadolinium.    Received in the ED: Augmentin 875mg PO x1, Solumedrol 125mg IVP x1, dilaudid 1mg IVP x1, morphine 4mg IVP x1, Percocet 5mg-325mg PO x1, Mg 2g x1, KCl 40 mEq x1, Duonebs x1   (2025 00:33)      INTERVAL HPI/OVERNIGHT EVENTS: Patient was seen and examined. No acute events occurred overnight. No new complaints.    I&O's Summary    2025 07:01  -  2025 07:00  --------------------------------------------------------  IN: 950 mL / OUT: 0 mL / NET: 950 mL        PAST MEDICAL & SURGICAL HISTORY:  Asthma      Menorrhagia      Fibroids  uterine      ADHD (attention deficit hyperactivity disorder)      Obesity      Asthma      Perforated nasal septum      ADHD      S/P tubal ligation      S/P  section        S/P tonsillectomy      S/P endometrial ablation            LABS:                        11.0   10.88 )-----------( 351      ( 2025 06:50 )             33.8     06-07    137  |  100  |  27[H]  ----------------------------<  102[H]  5.0   |  31  |  0.67    Ca    9.5      2025 06:50  Phos  6.1     06-07  Mg     2.3     06-07        Urinalysis Basic - ( 2025 06:50 )    Color: x / Appearance: x / SG: x / pH: x  Gluc: 102 mg/dL / Ketone: x  / Bili: x / Urobili: x   Blood: x / Protein: x / Nitrite: x   Leuk Esterase: x / RBC: x / WBC x   Sq Epi: x / Non Sq Epi: x / Bacteria: x      CAPILLARY BLOOD GLUCOSE            Urinalysis Basic - ( 2025 06:50 )    Color: x / Appearance: x / SG: x / pH: x  Gluc: 102 mg/dL / Ketone: x  / Bili: x / Urobili: x   Blood: x / Protein: x / Nitrite: x   Leuk Esterase: x / RBC: x / WBC x   Sq Epi: x / Non Sq Epi: x / Bacteria: x        MEDICATIONS  (STANDING):  albuterol/ipratropium for Nebulization 3 milliLiter(s) Nebulizer every 6 hours  cefTRIAXone   IVPB 1000 milliGRAM(s) IV Intermittent every 24 hours  chlorhexidine 4% Liquid 1 Application(s) Topical <User Schedule>  DAPTOmycin IVPB 400 milliGRAM(s) IV Intermittent every 24 hours  enoxaparin Injectable 40 milliGRAM(s) SubCutaneous every 24 hours  gabapentin 200 milliGRAM(s) Oral three times a day  hydrocortisone 1% Cream 1 Application(s) Topical three times a day  HYDROmorphone   Tablet 2 milliGRAM(s) Oral every 4 hours  ketorolac   Injectable 30 milliGRAM(s) IV Push every 8 hours  methylPREDNISolone sodium succinate Injectable 40 milliGRAM(s) IV Push daily  mupirocin 2% Nasal 1 Application(s) Both Nostrils two times a day  pantoprazole  Injectable 40 milliGRAM(s) IV Push two times a day  traZODone 150 milliGRAM(s) Oral at bedtime    MEDICATIONS  (PRN):  acetaminophen     Tablet .. 650 milliGRAM(s) Oral every 6 hours PRN Temp greater or equal to 38C (100.4F), Mild Pain (1 - 3)  albuterol    90 MICROgram(s) HFA Inhaler 2 Puff(s) Inhalation every 6 hours PRN Bronchospasm  aluminum hydroxide/magnesium hydroxide/simethicone Suspension 30 milliLiter(s) Oral every 4 hours PRN Dyspepsia  HYDROmorphone  Injectable 0.5 milliGRAM(s) IV Push every 4 hours PRN Severe Pain (7 - 10)  melatonin 3 milliGRAM(s) Oral at bedtime PRN Insomnia  ondansetron Injectable 4 milliGRAM(s) IV Push every 8 hours PRN Nausea and/or Vomiting  sodium chloride 0.65% Nasal 2 Spray(s) Both Nostrils every 1 hour PRN Nasal Congestion  sodium chloride 0.9% lock flush 10 milliLiter(s) IV Push every 1 hour PRN Pre/post blood products, medications, blood draw, and to maintain line patency      REVIEW OF SYSTEMS:  CONSTITUTIONAL: No fever or chills  HEENT:  No headache, no sore throat  RESPIRATORY: No cough, wheezing, or shortness of breath  CARDIOVASCULAR: No chest pain, palpitations  GASTROINTESTINAL: No abdominal pain, nausea, vomiting, or diarrhea  GENITOURINARY: No dysuria, frequency, or hematuria  NEUROLOGICAL: no focal weakness or dizziness  MUSCULOSKELETAL: no myalgias  PSYCH: no recent changes in mood    RADIOLOGY & ADDITIONAL TESTS:    Imaging Personally Reviewed:  [x] YES  [ ] NO    Consultant(s) Notes Reviewed:  [x] YES  [ ] NO    PHYSICAL EXAM:  T(C): 36.4 (25 @ 11:42), Max: 36.7 (25 @ 22:50)  HR: 82 (25 @ 13:48) (72 - 87)  BP: 108/71 (25 @ 11:42) (101/66 - 126/82)  RR: 18 (25 @ 11:42) (18 - 18)  SpO2: 97% (25 @ 13:48) (91% - 98%)    GENERAL: NAD, well-developed, well-groomed  HEENT:  anicteric, moist mucous membranes  CHEST/LUNG:  CTA b/l, no rales, wheezes, or rhonchi  HEART:  RRR, S1, S2  ABDOMEN:  BS+, soft, nontender, nondistended  EXTREMITIES: no edema, cyanosis, or calf tenderness  NERVOUS SYSTEM: answers questions and follows commands appropriately  PSYCH: normal affect    Care Discussed with Consultants/Other Providers [x] YES  [ ] NO Patient is a 54y old  Female who presents with a chief complaint of Sinusitis, pain management (2025 22:19)      FROM ADMISSION H+P:   HPI:  55yo F with PMHx of asthma/COPD (no home O2), vasculitis presents to the ED with persistent severe diffuse facial pain, nasal edema, associated with thick mucous nasal discharge x1.5 week. Patient describes her facial pain as deep bone pain and soft tissue pain, stating she feels pain starts in her nose and radiates to her gums. Patient states she feels this is vasculitis flare-up and feels similar to her only prior flare-up 3 months ago. Patient has been using daily nasal saline as recommended. Most recently, she went to Urgent Care 5 days ago for the pain, where she was prescribed doxycycline 100mg BID (completed 5-days of doxy so far). Patient admits to trying to remove scabs in nares with this flare up. Patient reports dilaudid helped bring pain down from 10/10 to 6/10, but pain is creeping up again. She reports she has Hx of asthma and usually uses inhalers PRN not daily, but has been using them 2-3x a day over the past 1.5 wks since the flare-up started. Patient also states she feels like is having discomfort with swallowing, intermittent blurry vision associated with the flare-ups; denies blurry vision at this time. She endorses occasional focal rashes, palpitations in the past, but denies any rashes at this time. Endorses diarrhea yesterday evening; denies fever, chills, nausea, vomiting. Patient follows with Rheum, ENT for recently diagnosed vasculitis; currently, workup in progress with Rheum to determine which kind; she states that her last visit was 1-2 mo ago. Patient endorses FHx of autoimmune conditions; she states her daughter is currently undergoing workup for SLE.    Denies chest pain, SOB, cough, abdominal pain, nausea, vomiting, urinary frequency, urgency, dysuria, changes in vision, dizziness, numbness, tingling.    Denies recent international travel or sick contacts.    ED Course:   Vitals: BP: 129/85 , HR: 88 , Temp: 98.8, RR: 20 , SpO2: 94% on RA   Labs:   WBCs 9.59, H/H 11.4/34.1, BUN/Cr 17/0.67, Na 142, K 3.3  CT maxillofacial w/ IV cont:   1.  Chronic perforation of the nasal septum, with erosion of the left   inferior turbinate.  2.  Chronic erosive changes in the left hard palate, with improved   phlegmon in the soft palate.  3.  Correlate clinically to exclude chronic osteomyelitis.  4.  Consider maxillofacial MRI with gadolinium.    Received in the ED: Augmentin 875mg PO x1, Solumedrol 125mg IVP x1, dilaudid 1mg IVP x1, morphine 4mg IVP x1, Percocet 5mg-325mg PO x1, Mg 2g x1, KCl 40 mEq x1, Duonebs x1   (2025 00:33)      INTERVAL HPI/OVERNIGHT EVENTS: Patient was seen and examined. No acute events occurred overnight. No new complaints. States that she feels there is hard substance in her nose - doing saline rinses. Questions about her lab work - primarily steroid related. Labs fairly stable. No dark stools or abd pain. No melena but states not really having BM. BUN elevated - d/w pt as getting steroids, NSAIDs and LWMH - will give IV protonix BID. She is doing nasal rinses with tap water and not sterilizing the bottle. Advised that this can introduce infection. Should use distilled water and sanitize bottle before each rinse. Has been using vaseline to keep it moist but asking for mupirocin.    2025 07:01  -  2025 07:00  --------------------------------------------------------  IN: 950 mL / OUT: 0 mL / NET: 950 mL        PAST MEDICAL & SURGICAL HISTORY:  Asthma      Menorrhagia      Fibroids  uterine      ADHD (attention deficit hyperactivity disorder)      Obesity      Asthma      Perforated nasal septum      ADHD      S/P tubal ligation      S/P  section        S/P tonsillectomy      S/P endometrial ablation            LABS:                        11.0   10.88 )-----------( 351      ( 2025 06:50 )             33.8     -07    137  |  100  |  27[H]  ----------------------------<  102[H]  5.0   |  31  |  0.67    Ca    9.5      2025 06:50  Phos  6.1     06-  Mg     2.3     06-        Urinalysis Basic - ( 2025 06:50 )    Color: x / Appearance: x / SG: x / pH: x  Gluc: 102 mg/dL / Ketone: x  / Bili: x / Urobili: x   Blood: x / Protein: x / Nitrite: x   Leuk Esterase: x / RBC: x / WBC x   Sq Epi: x / Non Sq Epi: x / Bacteria: x      CAPILLARY BLOOD GLUCOSE            Urinalysis Basic - ( 2025 06:50 )    Color: x / Appearance: x / SG: x / pH: x  Gluc: 102 mg/dL / Ketone: x  / Bili: x / Urobili: x   Blood: x / Protein: x / Nitrite: x   Leuk Esterase: x / RBC: x / WBC x   Sq Epi: x / Non Sq Epi: x / Bacteria: x        MEDICATIONS  (STANDING):  albuterol/ipratropium for Nebulization 3 milliLiter(s) Nebulizer every 6 hours  cefTRIAXone   IVPB 1000 milliGRAM(s) IV Intermittent every 24 hours  chlorhexidine 4% Liquid 1 Application(s) Topical <User Schedule>  DAPTOmycin IVPB 400 milliGRAM(s) IV Intermittent every 24 hours  enoxaparin Injectable 40 milliGRAM(s) SubCutaneous every 24 hours  gabapentin 200 milliGRAM(s) Oral three times a day  hydrocortisone 1% Cream 1 Application(s) Topical three times a day  HYDROmorphone   Tablet 2 milliGRAM(s) Oral every 4 hours  ketorolac   Injectable 30 milliGRAM(s) IV Push every 8 hours  methylPREDNISolone sodium succinate Injectable 40 milliGRAM(s) IV Push daily  mupirocin 2% Nasal 1 Application(s) Both Nostrils two times a day  pantoprazole  Injectable 40 milliGRAM(s) IV Push two times a day  traZODone 150 milliGRAM(s) Oral at bedtime    MEDICATIONS  (PRN):  acetaminophen     Tablet .. 650 milliGRAM(s) Oral every 6 hours PRN Temp greater or equal to 38C (100.4F), Mild Pain (1 - 3)  albuterol    90 MICROgram(s) HFA Inhaler 2 Puff(s) Inhalation every 6 hours PRN Bronchospasm  aluminum hydroxide/magnesium hydroxide/simethicone Suspension 30 milliLiter(s) Oral every 4 hours PRN Dyspepsia  HYDROmorphone  Injectable 0.5 milliGRAM(s) IV Push every 4 hours PRN Severe Pain (7 - 10)  melatonin 3 milliGRAM(s) Oral at bedtime PRN Insomnia  ondansetron Injectable 4 milliGRAM(s) IV Push every 8 hours PRN Nausea and/or Vomiting  sodium chloride 0.65% Nasal 2 Spray(s) Both Nostrils every 1 hour PRN Nasal Congestion  sodium chloride 0.9% lock flush 10 milliLiter(s) IV Push every 1 hour PRN Pre/post blood products, medications, blood draw, and to maintain line patency      REVIEW OF SYSTEMS:  CONSTITUTIONAL: No fever or chills  HEENT:  No headache, no sore throat; +nasal pain  RESPIRATORY: No cough, wheezing, or shortness of breath  CARDIOVASCULAR: No chest pain, palpitations  GASTROINTESTINAL: No abdominal pain, nausea, vomiting, or diarrhea  GENITOURINARY: No dysuria, frequency, or hematuria  NEUROLOGICAL: no focal weakness or dizziness  MUSCULOSKELETAL: no myalgias  PSYCH: no recent changes in mood    RADIOLOGY & ADDITIONAL TESTS:    Imaging Personally Reviewed:  [x] YES  [ ] NO    Consultant(s) Notes Reviewed:  [x] YES  [ ] NO    PHYSICAL EXAM:  T(C): 36.4 (25 @ 11:42), Max: 36.7 (25 @ 22:50)  HR: 82 (25 @ 13:48) (72 - 87)  BP: 108/71 (25 @ 11:42) (101/66 - 126/82)  RR: 18 (25 @ 11:42) (18 - 18)  SpO2: 97% (25 @ 13:48) (91% - 98%)    GENERAL: NAD  HEENT:  anicteric, moist mucous membranes; NOSE: noted defect in septum - granulation tissue present  CHEST/LUNG:  CTA b/l, no rales, wheezes, or rhonchi  HEART:  RRR, S1, S2  ABDOMEN:  BS+, soft, nontender, nondistended  EXTREMITIES: no edema, cyanosis, or calf tenderness  NERVOUS SYSTEM: answers questions and follows commands appropriately  PSYCH: normal affect    Care Discussed with Consultants/Other Providers [x] YES  [ ] NO

## 2025-06-07 NOTE — PROGRESS NOTE ADULT - PROBLEM SELECTOR PLAN 1
Patient presenting with persistent severe diffuse facial pain, nasal edema, associated with thick mucous nasal discharge x1.5 week, similar to prior vasculitis flare-up 3 mo ago  - No leukocytosis, patient afebrile on admission  - CT maxillofacial showing chronic changes, concern for chronic osteomyelitis  - MR with concern for OM - ID recs long term IV abx - pt agreeable to LITO to facilitate - PICC ordered d/w IR/ID  - Continue rocephin - changing vanco to daptomycin as planned for long course - monitor CK/CMP  - Trend CBC w/ diff, fever curve - leukocytosis likely steroid induced  - ID (Dr. Adkins) consulted, follow up recs  - Pain management with ATC and PRN dilaudid as per pain mgmt - supplement with toradol for 24h and then switch to PO ibuprofen in timed intervals - ppi while on NSAIDs/steroids - pt reports significant NSAID use at home  - Continue IV steroids for now - will change to PO soon - MRI reviewed and d/w ENT consultant  - D/w Dr. Watson - states steroids + iv abx. no role for biopsy at present to check labwork first for wegeners and to follow with ENT on discharge. Pt saw rheum last month - outpt labs reviewd - per outpt rheum no definitive evidence of a vasculitis Patient presenting with persistent severe diffuse facial pain, nasal edema, associated with thick mucous nasal discharge x1.5 week, similar to prior vasculitis flare-up 3 mo ago  - No leukocytosis, patient afebrile on admission  - CT maxillofacial showing chronic changes, concern for chronic osteomyelitis  - MR with concern for OM - ID recs long term IV abx - pt agreeable to LITO to facilitate - PICC placed  - Continue rocephin - changing vanco to daptomycin as planned for long course - monitor CK/CMP  - Trend CBC w/ diff, fever curve - leukocytosis likely steroid induced  - ID (Dr. Adkins) consulted, follow up recs  - Pain management with ATC and PRN dilaudid as per pain mgmt - supplement with toradol - increase ppi while on NSAIDs/steroids - pt reports significant NSAID use at home - will try to limit - BUN noted - no obv GI bleed but prophylactic protonix would be prudent  - Continue IV steroids for now - will change to PO soon - MRI reviewed and d/w ENT consultant  - D/w Dr. Watson - states steroids + iv abx. no role for biopsy at present to check labwork first for wegeners and to follow with ENT on discharge. Pt saw rheum last month - outpt labs reviewd - per outpt rheum no definitive evidence of a vasculitis

## 2025-06-08 LAB
ANION GAP SERPL CALC-SCNC: 8 MMOL/L — SIGNIFICANT CHANGE UP (ref 5–17)
BUN SERPL-MCNC: 35 MG/DL — HIGH (ref 7–23)
CALCIUM SERPL-MCNC: 8.8 MG/DL — SIGNIFICANT CHANGE UP (ref 8.5–10.1)
CHLORIDE SERPL-SCNC: 103 MMOL/L — SIGNIFICANT CHANGE UP (ref 96–108)
CK SERPL-CCNC: 28 U/L — SIGNIFICANT CHANGE UP (ref 26–192)
CO2 SERPL-SCNC: 28 MMOL/L — SIGNIFICANT CHANGE UP (ref 22–31)
CREAT SERPL-MCNC: 0.79 MG/DL — SIGNIFICANT CHANGE UP (ref 0.5–1.3)
EGFR: 90 ML/MIN/1.73M2 — SIGNIFICANT CHANGE UP
EGFR: 90 ML/MIN/1.73M2 — SIGNIFICANT CHANGE UP
GLUCOSE SERPL-MCNC: 106 MG/DL — HIGH (ref 70–99)
HCT VFR BLD CALC: 34.1 % — LOW (ref 34.5–45)
HGB BLD-MCNC: 11 G/DL — LOW (ref 11.5–15.5)
MAGNESIUM SERPL-MCNC: 2.4 MG/DL — SIGNIFICANT CHANGE UP (ref 1.6–2.6)
MCHC RBC-ENTMCNC: 29.8 PG — SIGNIFICANT CHANGE UP (ref 27–34)
MCHC RBC-ENTMCNC: 32.3 G/DL — SIGNIFICANT CHANGE UP (ref 32–36)
MCV RBC AUTO: 92.4 FL — SIGNIFICANT CHANGE UP (ref 80–100)
MPO AB + PR3 PNL SER: SIGNIFICANT CHANGE UP
NRBC BLD AUTO-RTO: 0 /100 WBCS — SIGNIFICANT CHANGE UP (ref 0–0)
PHOSPHATE SERPL-MCNC: 5.4 MG/DL — HIGH (ref 2.5–4.5)
PLATELET # BLD AUTO: 344 K/UL — SIGNIFICANT CHANGE UP (ref 150–400)
POTASSIUM SERPL-MCNC: 5.2 MMOL/L — SIGNIFICANT CHANGE UP (ref 3.5–5.3)
POTASSIUM SERPL-SCNC: 5.2 MMOL/L — SIGNIFICANT CHANGE UP (ref 3.5–5.3)
RBC # BLD: 3.69 M/UL — LOW (ref 3.8–5.2)
RBC # FLD: 13.9 % — SIGNIFICANT CHANGE UP (ref 10.3–14.5)
SODIUM SERPL-SCNC: 139 MMOL/L — SIGNIFICANT CHANGE UP (ref 135–145)
WBC # BLD: 10.14 K/UL — SIGNIFICANT CHANGE UP (ref 3.8–10.5)
WBC # FLD AUTO: 10.14 K/UL — SIGNIFICANT CHANGE UP (ref 3.8–10.5)

## 2025-06-08 PROCEDURE — 99233 SBSQ HOSP IP/OBS HIGH 50: CPT

## 2025-06-08 RX ORDER — SALINE 7; 19 G/118ML; G/118ML
1 ENEMA RECTAL ONCE
Refills: 0 | Status: COMPLETED | OUTPATIENT
Start: 2025-06-08 | End: 2025-06-08

## 2025-06-08 RX ORDER — POLYETHYLENE GLYCOL 3350 17 G/17G
17 POWDER, FOR SOLUTION ORAL
Refills: 0 | Status: DISCONTINUED | OUTPATIENT
Start: 2025-06-08 | End: 2025-06-11

## 2025-06-08 RX ORDER — IBUPROFEN 200 MG
400 TABLET ORAL EVERY 6 HOURS
Refills: 0 | Status: DISCONTINUED | OUTPATIENT
Start: 2025-06-08 | End: 2025-06-11

## 2025-06-08 RX ORDER — PREDNISONE 20 MG/1
40 TABLET ORAL DAILY
Refills: 0 | Status: DISCONTINUED | OUTPATIENT
Start: 2025-06-09 | End: 2025-06-11

## 2025-06-08 RX ORDER — GABAPENTIN 400 MG/1
300 CAPSULE ORAL THREE TIMES A DAY
Refills: 0 | Status: DISCONTINUED | OUTPATIENT
Start: 2025-06-08 | End: 2025-06-11

## 2025-06-08 RX ORDER — SENNA 187 MG
2 TABLET ORAL AT BEDTIME
Refills: 0 | Status: DISCONTINUED | OUTPATIENT
Start: 2025-06-08 | End: 2025-06-11

## 2025-06-08 RX ORDER — BISACODYL 5 MG
10 TABLET, DELAYED RELEASE (ENTERIC COATED) ORAL ONCE
Refills: 0 | Status: COMPLETED | OUTPATIENT
Start: 2025-06-08 | End: 2025-06-08

## 2025-06-08 RX ADMIN — SALINE 1 ENEMA: 7; 19 ENEMA RECTAL at 22:41

## 2025-06-08 RX ADMIN — KETOROLAC TROMETHAMINE 30 MILLIGRAM(S): 30 INJECTION, SOLUTION INTRAMUSCULAR; INTRAVENOUS at 14:13

## 2025-06-08 RX ADMIN — KETOROLAC TROMETHAMINE 30 MILLIGRAM(S): 30 INJECTION, SOLUTION INTRAMUSCULAR; INTRAVENOUS at 05:36

## 2025-06-08 RX ADMIN — IPRATROPIUM BROMIDE AND ALBUTEROL SULFATE 3 MILLILITER(S): .5; 2.5 SOLUTION RESPIRATORY (INHALATION) at 07:56

## 2025-06-08 RX ADMIN — Medication 0.5 MILLIGRAM(S): at 08:19

## 2025-06-08 RX ADMIN — Medication 2 MILLIGRAM(S): at 02:57

## 2025-06-08 RX ADMIN — Medication 150 MILLIGRAM(S): at 21:37

## 2025-06-08 RX ADMIN — Medication 1 APPLICATION(S): at 09:51

## 2025-06-08 RX ADMIN — METHYLPREDNISOLONE ACETATE 40 MILLIGRAM(S): 80 INJECTION, SUSPENSION INTRA-ARTICULAR; INTRALESIONAL; INTRAMUSCULAR; SOFT TISSUE at 05:36

## 2025-06-08 RX ADMIN — KETOROLAC TROMETHAMINE 30 MILLIGRAM(S): 30 INJECTION, SOLUTION INTRAMUSCULAR; INTRAVENOUS at 13:58

## 2025-06-08 RX ADMIN — Medication 2 MILLIGRAM(S): at 18:37

## 2025-06-08 RX ADMIN — HYDROCORTISONE 1 APPLICATION(S): 10 CREAM TOPICAL at 15:11

## 2025-06-08 RX ADMIN — Medication 0.5 MILLIGRAM(S): at 08:55

## 2025-06-08 RX ADMIN — Medication 40 MILLIGRAM(S): at 18:38

## 2025-06-08 RX ADMIN — HYDROCORTISONE 1 APPLICATION(S): 10 CREAM TOPICAL at 05:35

## 2025-06-08 RX ADMIN — Medication 2 MILLIGRAM(S): at 19:31

## 2025-06-08 RX ADMIN — Medication 2 MILLIGRAM(S): at 11:15

## 2025-06-08 RX ADMIN — Medication 2 MILLIGRAM(S): at 14:13

## 2025-06-08 RX ADMIN — Medication 10 MILLIGRAM(S): at 21:46

## 2025-06-08 RX ADMIN — Medication 2 MILLIGRAM(S): at 06:36

## 2025-06-08 RX ADMIN — MUPIROCIN CALCIUM 1 APPLICATION(S): 20 CREAM TOPICAL at 05:37

## 2025-06-08 RX ADMIN — Medication 40 MILLIGRAM(S): at 05:35

## 2025-06-08 RX ADMIN — Medication 0.5 MILLIGRAM(S): at 19:21

## 2025-06-08 RX ADMIN — POLYETHYLENE GLYCOL 3350 17 GRAM(S): 17 POWDER, FOR SOLUTION ORAL at 18:38

## 2025-06-08 RX ADMIN — IPRATROPIUM BROMIDE AND ALBUTEROL SULFATE 3 MILLILITER(S): .5; 2.5 SOLUTION RESPIRATORY (INHALATION) at 13:37

## 2025-06-08 RX ADMIN — Medication 2 MILLIGRAM(S): at 05:36

## 2025-06-08 RX ADMIN — Medication 2 MILLIGRAM(S): at 01:57

## 2025-06-08 RX ADMIN — KETOROLAC TROMETHAMINE 30 MILLIGRAM(S): 30 INJECTION, SOLUTION INTRAMUSCULAR; INTRAVENOUS at 06:36

## 2025-06-08 RX ADMIN — HYDROCORTISONE 1 APPLICATION(S): 10 CREAM TOPICAL at 22:34

## 2025-06-08 RX ADMIN — Medication 2 TABLET(S): at 21:36

## 2025-06-08 RX ADMIN — CEFTRIAXONE 100 MILLIGRAM(S): 500 INJECTION, POWDER, FOR SOLUTION INTRAMUSCULAR; INTRAVENOUS at 15:43

## 2025-06-08 RX ADMIN — ENOXAPARIN SODIUM 40 MILLIGRAM(S): 100 INJECTION SUBCUTANEOUS at 15:11

## 2025-06-08 RX ADMIN — Medication 0.5 MILLIGRAM(S): at 19:45

## 2025-06-08 RX ADMIN — MUPIROCIN CALCIUM 1 APPLICATION(S): 20 CREAM TOPICAL at 18:31

## 2025-06-08 RX ADMIN — IPRATROPIUM BROMIDE AND ALBUTEROL SULFATE 3 MILLILITER(S): .5; 2.5 SOLUTION RESPIRATORY (INHALATION) at 00:48

## 2025-06-08 RX ADMIN — Medication 2 MILLIGRAM(S): at 13:58

## 2025-06-08 RX ADMIN — IPRATROPIUM BROMIDE AND ALBUTEROL SULFATE 3 MILLILITER(S): .5; 2.5 SOLUTION RESPIRATORY (INHALATION) at 19:34

## 2025-06-08 RX ADMIN — GABAPENTIN 200 MILLIGRAM(S): 400 CAPSULE ORAL at 06:15

## 2025-06-08 RX ADMIN — GABAPENTIN 200 MILLIGRAM(S): 400 CAPSULE ORAL at 13:58

## 2025-06-08 RX ADMIN — DAPTOMYCIN 116 MILLIGRAM(S): 500 INJECTION, POWDER, LYOPHILIZED, FOR SOLUTION INTRAVENOUS at 15:11

## 2025-06-08 RX ADMIN — Medication 0.5 MILLIGRAM(S): at 13:14

## 2025-06-08 RX ADMIN — Medication 2 SPRAY(S): at 10:28

## 2025-06-08 RX ADMIN — Medication 2 MILLIGRAM(S): at 10:28

## 2025-06-08 RX ADMIN — GABAPENTIN 300 MILLIGRAM(S): 400 CAPSULE ORAL at 21:36

## 2025-06-08 RX ADMIN — Medication 2 MILLIGRAM(S): at 23:22

## 2025-06-08 RX ADMIN — Medication 0.5 MILLIGRAM(S): at 13:45

## 2025-06-08 NOTE — PROGRESS NOTE ADULT - PROBLEM SELECTOR PLAN 2
Patient follows with Rheum, ENT for recently diagnosed vasculitis??; currently, workup in progress with Rheum to determine which kind - per rheum message to pt - no definitive evidence of vasculitis - will try to reach o/p md  - Continue outpatient follow up with Rheum, ENT  - Autoimmune labs sent but reviewed outpt work up  - So far lab work is not consistent with vasculitis  - Rest of plan as above

## 2025-06-08 NOTE — PROGRESS NOTE ADULT - PROBLEM SELECTOR PLAN 1
Patient presenting with persistent severe diffuse facial pain, nasal edema, associated with thick mucous nasal discharge x1.5 week, similar to prior vasculitis flare-up 3 mo ago  - No leukocytosis, patient afebrile on admission  - CT maxillofacial showing chronic changes, concern for chronic osteomyelitis  - MR with concern for OM - ID recs long term IV abx - pt agreeable to LITO to facilitate - PICC placed  - Continue rocephin - changing vanco to daptomycin as planned for long course - monitor CK/CMP  - Trend CBC w/ diff, fever curve - leukocytosis likely steroid induced  - ID (Dr. Adkins) consulted, follow up recs  - Pain management with ATC and PRN dilaudid as per pain mgmt - would change PRN to PO - awaiting pain recs  - Added PRN ibuprofen and increased gabapentin to 300mg TID in anticipation of pain regimen deescalation  - GI prophylaxis with IV protonix BID - can change to PO qd if no bleeding  - Add increased bowel regimen on PO narcotics. Miralax BID + senna. Will give dulcolax suppository as well as fleet enema (if suppository does not work)  - Change solu-medrol to prednisone (7-10d course) - MRI reviewed and d/w ENT consultant  - D/w Dr. Watson - states steroids + iv abx. no role for biopsy at present to check labwork first for wegeners and to follow with ENT on discharge. Pt saw rheum last month - outpt labs reviewd - per outpt rheum no definitive evidence of a vasculitis. Patient has an ENT appt with Pilar in a few weeks - should get biopsy with ENT if they are willing to do for definitive dx - steroid initiation may skew data - although biopsy can be done for OM

## 2025-06-08 NOTE — PROGRESS NOTE ADULT - SUBJECTIVE AND OBJECTIVE BOX
Patient is a 54y old  Female who presents with a chief complaint of Sinusitis, pain management (2025 18:18)      FROM ADMISSION H+P:   HPI:  53yo F with PMHx of asthma/COPD (no home O2), vasculitis presents to the ED with persistent severe diffuse facial pain, nasal edema, associated with thick mucous nasal discharge x1.5 week. Patient describes her facial pain as deep bone pain and soft tissue pain, stating she feels pain starts in her nose and radiates to her gums. Patient states she feels this is vasculitis flare-up and feels similar to her only prior flare-up 3 months ago. Patient has been using daily nasal saline as recommended. Most recently, she went to Urgent Care 5 days ago for the pain, where she was prescribed doxycycline 100mg BID (completed 5-days of doxy so far). Patient admits to trying to remove scabs in nares with this flare up. Patient reports dilaudid helped bring pain down from 10/10 to 6/10, but pain is creeping up again. She reports she has Hx of asthma and usually uses inhalers PRN not daily, but has been using them 2-3x a day over the past 1.5 wks since the flare-up started. Patient also states she feels like is having discomfort with swallowing, intermittent blurry vision associated with the flare-ups; denies blurry vision at this time. She endorses occasional focal rashes, palpitations in the past, but denies any rashes at this time. Endorses diarrhea yesterday evening; denies fever, chills, nausea, vomiting. Patient follows with Rheum, ENT for recently diagnosed vasculitis; currently, workup in progress with Rheum to determine which kind; she states that her last visit was 1-2 mo ago. Patient endorses FHx of autoimmune conditions; she states her daughter is currently undergoing workup for SLE.    Denies chest pain, SOB, cough, abdominal pain, nausea, vomiting, urinary frequency, urgency, dysuria, changes in vision, dizziness, numbness, tingling.    Denies recent international travel or sick contacts.    ED Course:   Vitals: BP: 129/85 , HR: 88 , Temp: 98.8, RR: 20 , SpO2: 94% on RA   Labs:   WBCs 9.59, H/H 11.4/34.1, BUN/Cr 17/0.67, Na 142, K 3.3  CT maxillofacial w/ IV cont:   1.  Chronic perforation of the nasal septum, with erosion of the left   inferior turbinate.  2.  Chronic erosive changes in the left hard palate, with improved   phlegmon in the soft palate.  3.  Correlate clinically to exclude chronic osteomyelitis.  4.  Consider maxillofacial MRI with gadolinium.    Received in the ED: Augmentin 875mg PO x1, Solumedrol 125mg IVP x1, dilaudid 1mg IVP x1, morphine 4mg IVP x1, Percocet 5mg-325mg PO x1, Mg 2g x1, KCl 40 mEq x1, Duonebs x1   (2025 00:33)      INTERVAL HPI/OVERNIGHT EVENTS: Patient was seen and examined. No acute events occurred overnight. No new complaints.    I&O's Summary      PAST MEDICAL & SURGICAL HISTORY:  Asthma      Menorrhagia      Fibroids  uterine      ADHD (attention deficit hyperactivity disorder)      Obesity      Asthma      Perforated nasal septum      ADHD      S/P tubal ligation      S/P  section        S/P tonsillectomy      S/P endometrial ablation            LABS:                        11.0   10.14 )-----------( 344      ( 2025 06:15 )             34.1     06-08    139  |  103  |  35[H]  ----------------------------<  106[H]  5.2   |  28  |  0.79    Ca    8.8      2025 06:15  Phos  5.4     06-08  Mg     2.4     06-08        Urinalysis Basic - ( 2025 06:15 )    Color: x / Appearance: x / SG: x / pH: x  Gluc: 106 mg/dL / Ketone: x  / Bili: x / Urobili: x   Blood: x / Protein: x / Nitrite: x   Leuk Esterase: x / RBC: x / WBC x   Sq Epi: x / Non Sq Epi: x / Bacteria: x      CAPILLARY BLOOD GLUCOSE            Urinalysis Basic - ( 2025 06:15 )    Color: x / Appearance: x / SG: x / pH: x  Gluc: 106 mg/dL / Ketone: x  / Bili: x / Urobili: x   Blood: x / Protein: x / Nitrite: x   Leuk Esterase: x / RBC: x / WBC x   Sq Epi: x / Non Sq Epi: x / Bacteria: x        MEDICATIONS  (STANDING):  albuterol/ipratropium for Nebulization 3 milliLiter(s) Nebulizer every 6 hours  cefTRIAXone   IVPB 1000 milliGRAM(s) IV Intermittent every 24 hours  chlorhexidine 4% Liquid 1 Application(s) Topical <User Schedule>  DAPTOmycin IVPB 400 milliGRAM(s) IV Intermittent every 24 hours  enoxaparin Injectable 40 milliGRAM(s) SubCutaneous every 24 hours  gabapentin 200 milliGRAM(s) Oral three times a day  hydrocortisone 1% Cream 1 Application(s) Topical three times a day  HYDROmorphone   Tablet 2 milliGRAM(s) Oral every 4 hours  methylPREDNISolone sodium succinate Injectable 40 milliGRAM(s) IV Push daily  mupirocin 2% Nasal 1 Application(s) Both Nostrils two times a day  pantoprazole  Injectable 40 milliGRAM(s) IV Push two times a day  traZODone 150 milliGRAM(s) Oral at bedtime    MEDICATIONS  (PRN):  acetaminophen     Tablet .. 650 milliGRAM(s) Oral every 6 hours PRN Temp greater or equal to 38C (100.4F), Mild Pain (1 - 3)  albuterol    90 MICROgram(s) HFA Inhaler 2 Puff(s) Inhalation every 6 hours PRN Bronchospasm  aluminum hydroxide/magnesium hydroxide/simethicone Suspension 30 milliLiter(s) Oral every 4 hours PRN Dyspepsia  HYDROmorphone  Injectable 0.5 milliGRAM(s) IV Push every 4 hours PRN Severe Pain (7 - 10)  melatonin 3 milliGRAM(s) Oral at bedtime PRN Insomnia  ondansetron Injectable 4 milliGRAM(s) IV Push every 8 hours PRN Nausea and/or Vomiting  sodium chloride 0.65% Nasal 2 Spray(s) Both Nostrils every 1 hour PRN Nasal Congestion  sodium chloride 0.9% lock flush 10 milliLiter(s) IV Push every 1 hour PRN Pre/post blood products, medications, blood draw, and to maintain line patency      REVIEW OF SYSTEMS:  CONSTITUTIONAL: No fever or chills  HEENT:  No headache, no sore throat  RESPIRATORY: No cough, wheezing, or shortness of breath  CARDIOVASCULAR: No chest pain, palpitations  GASTROINTESTINAL: No abdominal pain, nausea, vomiting, or diarrhea  GENITOURINARY: No dysuria, frequency, or hematuria  NEUROLOGICAL: no focal weakness or dizziness  MUSCULOSKELETAL: no myalgias  PSYCH: no recent changes in mood    RADIOLOGY & ADDITIONAL TESTS:    Imaging Personally Reviewed:  [x] YES  [ ] NO    Consultant(s) Notes Reviewed:  [x] YES  [ ] NO    PHYSICAL EXAM:  T(C): 37.1 (25 @ 12:16), Max: 37.1 (25 @ 12:16)  HR: 83 (25 @ 13:35) (69 - 89)  BP: 128/80 (25 @ 12:16) (94/61 - 128/80)  RR: 18 (25 @ 12:16) (18 - 18)  SpO2: 96% (25 @ 13:35) (93% - 96%)    GENERAL: NAD, well-developed, well-groomed  HEENT:  anicteric, moist mucous membranes  CHEST/LUNG:  CTA b/l, no rales, wheezes, or rhonchi  HEART:  RRR, S1, S2  ABDOMEN:  BS+, soft, nontender, nondistended  EXTREMITIES: no edema, cyanosis, or calf tenderness  NERVOUS SYSTEM: answers questions and follows commands appropriately  PSYCH: normal affect    Care Discussed with Consultants/Other Providers [x] YES  [ ] NO Patient is a 54y old  Female who presents with a chief complaint of Sinusitis, pain management (2025 18:18)      FROM ADMISSION H+P:   HPI:  53yo F with PMHx of asthma/COPD (no home O2), vasculitis presents to the ED with persistent severe diffuse facial pain, nasal edema, associated with thick mucous nasal discharge x1.5 week. Patient describes her facial pain as deep bone pain and soft tissue pain, stating she feels pain starts in her nose and radiates to her gums. Patient states she feels this is vasculitis flare-up and feels similar to her only prior flare-up 3 months ago. Patient has been using daily nasal saline as recommended. Most recently, she went to Urgent Care 5 days ago for the pain, where she was prescribed doxycycline 100mg BID (completed 5-days of doxy so far). Patient admits to trying to remove scabs in nares with this flare up. Patient reports dilaudid helped bring pain down from 10/10 to 6/10, but pain is creeping up again. She reports she has Hx of asthma and usually uses inhalers PRN not daily, but has been using them 2-3x a day over the past 1.5 wks since the flare-up started. Patient also states she feels like is having discomfort with swallowing, intermittent blurry vision associated with the flare-ups; denies blurry vision at this time. She endorses occasional focal rashes, palpitations in the past, but denies any rashes at this time. Endorses diarrhea yesterday evening; denies fever, chills, nausea, vomiting. Patient follows with Rheum, ENT for recently diagnosed vasculitis; currently, workup in progress with Rheum to determine which kind; she states that her last visit was 1-2 mo ago. Patient endorses FHx of autoimmune conditions; she states her daughter is currently undergoing workup for SLE.    Denies chest pain, SOB, cough, abdominal pain, nausea, vomiting, urinary frequency, urgency, dysuria, changes in vision, dizziness, numbness, tingling.    Denies recent international travel or sick contacts.    ED Course:   Vitals: BP: 129/85 , HR: 88 , Temp: 98.8, RR: 20 , SpO2: 94% on RA   Labs:   WBCs 9.59, H/H 11.4/34.1, BUN/Cr 17/0.67, Na 142, K 3.3  CT maxillofacial w/ IV cont:   1.  Chronic perforation of the nasal septum, with erosion of the left   inferior turbinate.  2.  Chronic erosive changes in the left hard palate, with improved   phlegmon in the soft palate.  3.  Correlate clinically to exclude chronic osteomyelitis.  4.  Consider maxillofacial MRI with gadolinium.    Received in the ED: Augmentin 875mg PO x1, Solumedrol 125mg IVP x1, dilaudid 1mg IVP x1, morphine 4mg IVP x1, Percocet 5mg-325mg PO x1, Mg 2g x1, KCl 40 mEq x1, Duonebs x1   (2025 00:33)      INTERVAL HPI/OVERNIGHT EVENTS: Patient was seen and examined. No acute events occurred overnight. No new complaints. Has granulation tissue built up and feels it loosening up to eventually come out with saline rinse. She has gotten bottles of sodium chloride to use for her rinses instead of tap water now. Advised that she can add one packet of saline mix for a more hypertonic solution or just use the NS. Otherwise would use distilled water to mix with 2 saline packets. Says pain is less generalized now. Still some neuropathic pain. Trying to use less PRN to make an appropriate DC regimen. NSAIDs do seem to help. Patient is constipated.    I&O's Summary      PAST MEDICAL & SURGICAL HISTORY:  Asthma      Menorrhagia      Fibroids  uterine      ADHD (attention deficit hyperactivity disorder)      Obesity      Asthma      Perforated nasal septum      ADHD      S/P tubal ligation      S/P  section  /      S/P tonsillectomy      S/P endometrial ablation            LABS:                        11.0   10.14 )-----------( 344      ( 2025 06:15 )             34.1     06-08    139  |  103  |  35[H]  ----------------------------<  106[H]  5.2   |  28  |  0.79    Ca    8.8      2025 06:15  Phos  5.4     06-08  Mg     2.4     06-08        Urinalysis Basic - ( 2025 06:15 )    Color: x / Appearance: x / SG: x / pH: x  Gluc: 106 mg/dL / Ketone: x  / Bili: x / Urobili: x   Blood: x / Protein: x / Nitrite: x   Leuk Esterase: x / RBC: x / WBC x   Sq Epi: x / Non Sq Epi: x / Bacteria: x      CAPILLARY BLOOD GLUCOSE            Urinalysis Basic - ( 2025 06:15 )    Color: x / Appearance: x / SG: x / pH: x  Gluc: 106 mg/dL / Ketone: x  / Bili: x / Urobili: x   Blood: x / Protein: x / Nitrite: x   Leuk Esterase: x / RBC: x / WBC x   Sq Epi: x / Non Sq Epi: x / Bacteria: x        MEDICATIONS  (STANDING):  albuterol/ipratropium for Nebulization 3 milliLiter(s) Nebulizer every 6 hours  cefTRIAXone   IVPB 1000 milliGRAM(s) IV Intermittent every 24 hours  chlorhexidine 4% Liquid 1 Application(s) Topical <User Schedule>  DAPTOmycin IVPB 400 milliGRAM(s) IV Intermittent every 24 hours  enoxaparin Injectable 40 milliGRAM(s) SubCutaneous every 24 hours  gabapentin 200 milliGRAM(s) Oral three times a day  hydrocortisone 1% Cream 1 Application(s) Topical three times a day  HYDROmorphone   Tablet 2 milliGRAM(s) Oral every 4 hours  methylPREDNISolone sodium succinate Injectable 40 milliGRAM(s) IV Push daily  mupirocin 2% Nasal 1 Application(s) Both Nostrils two times a day  pantoprazole  Injectable 40 milliGRAM(s) IV Push two times a day  traZODone 150 milliGRAM(s) Oral at bedtime    MEDICATIONS  (PRN):  acetaminophen     Tablet .. 650 milliGRAM(s) Oral every 6 hours PRN Temp greater or equal to 38C (100.4F), Mild Pain (1 - 3)  albuterol    90 MICROgram(s) HFA Inhaler 2 Puff(s) Inhalation every 6 hours PRN Bronchospasm  aluminum hydroxide/magnesium hydroxide/simethicone Suspension 30 milliLiter(s) Oral every 4 hours PRN Dyspepsia  HYDROmorphone  Injectable 0.5 milliGRAM(s) IV Push every 4 hours PRN Severe Pain (7 - 10)  melatonin 3 milliGRAM(s) Oral at bedtime PRN Insomnia  ondansetron Injectable 4 milliGRAM(s) IV Push every 8 hours PRN Nausea and/or Vomiting  sodium chloride 0.65% Nasal 2 Spray(s) Both Nostrils every 1 hour PRN Nasal Congestion  sodium chloride 0.9% lock flush 10 milliLiter(s) IV Push every 1 hour PRN Pre/post blood products, medications, blood draw, and to maintain line patency      REVIEW OF SYSTEMS:  CONSTITUTIONAL: No fever or chills  HEENT:  No headache, no sore throat; + nasal pain  RESPIRATORY: No cough, wheezing, or shortness of breath  CARDIOVASCULAR: No chest pain, palpitations  GASTROINTESTINAL: No abdominal pain, nausea, vomiting, or diarrhea; + constipation  GENITOURINARY: No dysuria, frequency, or hematuria  NEUROLOGICAL: no focal weakness or dizziness  MUSCULOSKELETAL: no myalgias  PSYCH: no recent changes in mood    RADIOLOGY & ADDITIONAL TESTS:    Imaging Personally Reviewed:  [x] YES  [ ] NO    Consultant(s) Notes Reviewed:  [x] YES  [ ] NO    PHYSICAL EXAM:  T(C): 37.1 (25 @ 12:16), Max: 37.1 (25 @ 12:16)  HR: 83 (25 @ 13:35) (69 - 89)  BP: 128/80 (25 @ 12:16) (94/61 - 128/80)  RR: 18 (25 @ 12:16) (18 - 18)  SpO2: 96% (25 @ 13:35) (93% - 96%)    GENERAL: NAD  HEENT:  anicteric, moist mucous membranes; NOSE: left nostril examined with built up granulation tissue and dried blood, no active bleed  CHEST/LUNG:  CTA b/l, no rales, wheezes, or rhonchi  HEART:  RRR, S1, S2  ABDOMEN:  BS+, soft, nontender, nondistended  EXTREMITIES: no edema, cyanosis, or calf tenderness  NERVOUS SYSTEM: answers questions and follows commands appropriately  PSYCH: normal affect    Care Discussed with Consultants/Other Providers [x] YES  [ ] NO

## 2025-06-09 ENCOUNTER — NON-APPOINTMENT (OUTPATIENT)
Age: 55
End: 2025-06-09

## 2025-06-09 LAB
ANION GAP SERPL CALC-SCNC: 5 MMOL/L — SIGNIFICANT CHANGE UP (ref 5–17)
AUTO DIFF PNL BLD: NEGATIVE — SIGNIFICANT CHANGE UP
BUN SERPL-MCNC: 34 MG/DL — HIGH (ref 7–23)
C-ANCA SER-ACNC: POSITIVE
C-ANCA TITR SER: ABNORMAL
CALCIUM SERPL-MCNC: 9.1 MG/DL — SIGNIFICANT CHANGE UP (ref 8.5–10.1)
CHLORIDE SERPL-SCNC: 103 MMOL/L — SIGNIFICANT CHANGE UP (ref 96–108)
CK SERPL-CCNC: 48 U/L — SIGNIFICANT CHANGE UP (ref 26–192)
CO2 SERPL-SCNC: 28 MMOL/L — SIGNIFICANT CHANGE UP (ref 22–31)
CREAT SERPL-MCNC: 0.91 MG/DL — SIGNIFICANT CHANGE UP (ref 0.5–1.3)
CULTURE RESULTS: SIGNIFICANT CHANGE UP
CULTURE RESULTS: SIGNIFICANT CHANGE UP
EGFR: 76 ML/MIN/1.73M2 — SIGNIFICANT CHANGE UP
EGFR: 76 ML/MIN/1.73M2 — SIGNIFICANT CHANGE UP
GLUCOSE SERPL-MCNC: 99 MG/DL — SIGNIFICANT CHANGE UP (ref 70–99)
HCT VFR BLD CALC: 34.5 % — SIGNIFICANT CHANGE UP (ref 34.5–45)
HGB BLD-MCNC: 11.1 G/DL — LOW (ref 11.5–15.5)
MAGNESIUM SERPL-MCNC: 2.1 MG/DL — SIGNIFICANT CHANGE UP (ref 1.6–2.6)
MCHC RBC-ENTMCNC: 29.8 PG — SIGNIFICANT CHANGE UP (ref 27–34)
MCHC RBC-ENTMCNC: 32.2 G/DL — SIGNIFICANT CHANGE UP (ref 32–36)
MCV RBC AUTO: 92.5 FL — SIGNIFICANT CHANGE UP (ref 80–100)
NRBC BLD AUTO-RTO: 0 /100 WBCS — SIGNIFICANT CHANGE UP (ref 0–0)
P-ANCA SER-ACNC: NEGATIVE — SIGNIFICANT CHANGE UP
PHOSPHATE SERPL-MCNC: 5.2 MG/DL — HIGH (ref 2.5–4.5)
PLATELET # BLD AUTO: 380 K/UL — SIGNIFICANT CHANGE UP (ref 150–400)
POTASSIUM SERPL-MCNC: 5.1 MMOL/L — SIGNIFICANT CHANGE UP (ref 3.5–5.3)
POTASSIUM SERPL-SCNC: 5.1 MMOL/L — SIGNIFICANT CHANGE UP (ref 3.5–5.3)
RBC # BLD: 3.73 M/UL — LOW (ref 3.8–5.2)
RBC # FLD: 13.9 % — SIGNIFICANT CHANGE UP (ref 10.3–14.5)
SODIUM SERPL-SCNC: 136 MMOL/L — SIGNIFICANT CHANGE UP (ref 135–145)
SPECIMEN SOURCE: SIGNIFICANT CHANGE UP
SPECIMEN SOURCE: SIGNIFICANT CHANGE UP
WBC # BLD: 14.97 K/UL — HIGH (ref 3.8–10.5)
WBC # FLD AUTO: 14.97 K/UL — HIGH (ref 3.8–10.5)

## 2025-06-09 PROCEDURE — 99232 SBSQ HOSP IP/OBS MODERATE 35: CPT

## 2025-06-09 RX ORDER — MELATONIN 5 MG
10 TABLET ORAL AT BEDTIME
Refills: 0 | Status: DISCONTINUED | OUTPATIENT
Start: 2025-06-09 | End: 2025-06-11

## 2025-06-09 RX ADMIN — GABAPENTIN 300 MILLIGRAM(S): 400 CAPSULE ORAL at 14:15

## 2025-06-09 RX ADMIN — ENOXAPARIN SODIUM 40 MILLIGRAM(S): 100 INJECTION SUBCUTANEOUS at 11:52

## 2025-06-09 RX ADMIN — Medication 0.5 MILLIGRAM(S): at 23:41

## 2025-06-09 RX ADMIN — Medication 0.5 MILLIGRAM(S): at 00:10

## 2025-06-09 RX ADMIN — Medication 0.5 MILLIGRAM(S): at 14:53

## 2025-06-09 RX ADMIN — DAPTOMYCIN 116 MILLIGRAM(S): 500 INJECTION, POWDER, LYOPHILIZED, FOR SOLUTION INTRAVENOUS at 15:57

## 2025-06-09 RX ADMIN — Medication 2 MILLIGRAM(S): at 15:56

## 2025-06-09 RX ADMIN — IPRATROPIUM BROMIDE AND ALBUTEROL SULFATE 3 MILLILITER(S): .5; 2.5 SOLUTION RESPIRATORY (INHALATION) at 02:02

## 2025-06-09 RX ADMIN — IPRATROPIUM BROMIDE AND ALBUTEROL SULFATE 3 MILLILITER(S): .5; 2.5 SOLUTION RESPIRATORY (INHALATION) at 20:45

## 2025-06-09 RX ADMIN — Medication 40 MILLIGRAM(S): at 06:38

## 2025-06-09 RX ADMIN — CEFTRIAXONE 100 MILLIGRAM(S): 500 INJECTION, POWDER, FOR SOLUTION INTRAMUSCULAR; INTRAVENOUS at 15:56

## 2025-06-09 RX ADMIN — MUPIROCIN CALCIUM 1 APPLICATION(S): 20 CREAM TOPICAL at 06:30

## 2025-06-09 RX ADMIN — GABAPENTIN 300 MILLIGRAM(S): 400 CAPSULE ORAL at 06:30

## 2025-06-09 RX ADMIN — Medication 2 MILLIGRAM(S): at 11:52

## 2025-06-09 RX ADMIN — Medication 2 MILLIGRAM(S): at 22:48

## 2025-06-09 RX ADMIN — Medication 2 MILLIGRAM(S): at 23:36

## 2025-06-09 RX ADMIN — Medication 2 MILLIGRAM(S): at 07:40

## 2025-06-09 RX ADMIN — POLYETHYLENE GLYCOL 3350 17 GRAM(S): 17 POWDER, FOR SOLUTION ORAL at 17:32

## 2025-06-09 RX ADMIN — HYDROCORTISONE 1 APPLICATION(S): 10 CREAM TOPICAL at 22:47

## 2025-06-09 RX ADMIN — IPRATROPIUM BROMIDE AND ALBUTEROL SULFATE 3 MILLILITER(S): .5; 2.5 SOLUTION RESPIRATORY (INHALATION) at 07:25

## 2025-06-09 RX ADMIN — Medication 2 MILLIGRAM(S): at 16:56

## 2025-06-09 RX ADMIN — Medication 2 MILLIGRAM(S): at 03:01

## 2025-06-09 RX ADMIN — Medication 0.5 MILLIGRAM(S): at 17:53

## 2025-06-09 RX ADMIN — IPRATROPIUM BROMIDE AND ALBUTEROL SULFATE 3 MILLILITER(S): .5; 2.5 SOLUTION RESPIRATORY (INHALATION) at 13:25

## 2025-06-09 RX ADMIN — Medication 2 MILLIGRAM(S): at 00:22

## 2025-06-09 RX ADMIN — Medication 2 TABLET(S): at 22:49

## 2025-06-09 RX ADMIN — Medication 0.5 MILLIGRAM(S): at 10:29

## 2025-06-09 RX ADMIN — MUPIROCIN CALCIUM 1 APPLICATION(S): 20 CREAM TOPICAL at 17:32

## 2025-06-09 RX ADMIN — Medication 2 MILLIGRAM(S): at 02:08

## 2025-06-09 RX ADMIN — Medication 2 MILLIGRAM(S): at 20:28

## 2025-06-09 RX ADMIN — Medication 0.5 MILLIGRAM(S): at 09:29

## 2025-06-09 RX ADMIN — Medication 0.5 MILLIGRAM(S): at 04:11

## 2025-06-09 RX ADMIN — GABAPENTIN 300 MILLIGRAM(S): 400 CAPSULE ORAL at 22:48

## 2025-06-09 RX ADMIN — PREDNISONE 40 MILLIGRAM(S): 20 TABLET ORAL at 06:30

## 2025-06-09 RX ADMIN — Medication 10 MILLIGRAM(S): at 22:49

## 2025-06-09 RX ADMIN — Medication 0.5 MILLIGRAM(S): at 00:44

## 2025-06-09 RX ADMIN — Medication 0.5 MILLIGRAM(S): at 18:40

## 2025-06-09 RX ADMIN — Medication 150 MILLIGRAM(S): at 22:49

## 2025-06-09 RX ADMIN — Medication 40 MILLIGRAM(S): at 17:32

## 2025-06-09 RX ADMIN — Medication 1 APPLICATION(S): at 06:30

## 2025-06-09 RX ADMIN — Medication 0.5 MILLIGRAM(S): at 05:11

## 2025-06-09 RX ADMIN — Medication 2 MILLIGRAM(S): at 12:52

## 2025-06-09 RX ADMIN — Medication 0.5 MILLIGRAM(S): at 13:35

## 2025-06-09 RX ADMIN — HYDROCORTISONE 1 APPLICATION(S): 10 CREAM TOPICAL at 14:15

## 2025-06-09 RX ADMIN — POLYETHYLENE GLYCOL 3350 17 GRAM(S): 17 POWDER, FOR SOLUTION ORAL at 06:30

## 2025-06-09 RX ADMIN — Medication 2 MILLIGRAM(S): at 19:21

## 2025-06-09 NOTE — PROGRESS NOTE ADULT - SUBJECTIVE AND OBJECTIVE BOX
[facility]  INFECTIOUS DISEASES - [address], [place] [address]  Tel: [phone]     Fax: [phone]  =======================================================    SONU JUDD [id number]    Follow up: No fevers. Pain around nose less diffuse.    Allergies:  sulfa drugs (Hives; Rash)  sulfa drugs (Hives; Urticaria)      Antibiotics:  acetaminophen     Tablet .. 650 milliGRAM(s) Oral every 6 hours PRN  albuterol    90 MICROgram(s) HFA Inhaler 2 Puff(s) Inhalation every 6 hours PRN  albuterol/ipratropium for Nebulization 3 milliLiter(s) Nebulizer every 6 hours  aluminum hydroxide/magnesium hydroxide/simethicone Suspension 30 milliLiter(s) Oral every 4 hours PRN  cefTRIAXone   IVPB 1000 milliGRAM(s) IV Intermittent every 24 hours  chlorhexidine 4% Liquid 1 Application(s) Topical <User Schedule>  DAPTOmycin IVPB 400 milliGRAM(s) IV Intermittent every 24 hours  enoxaparin Injectable 40 milliGRAM(s) SubCutaneous every 24 hours  gabapentin 300 milliGRAM(s) Oral three times a day  hydrocortisone 1% Cream 1 Application(s) Topical three times a day  HYDROmorphone   Tablet 2 milliGRAM(s) Oral every 4 hours  HYDROmorphone  Injectable 0.5 milliGRAM(s) IV Push every 4 hours PRN  ibuprofen  Tablet. 400 milliGRAM(s) Oral every 6 hours PRN  melatonin 3 milliGRAM(s) Oral at bedtime PRN  mupirocin 2% Nasal 1 Application(s) Both Nostrils two times a day  ondansetron Injectable 4 milliGRAM(s) IV Push every 8 hours PRN  pantoprazole  Injectable 40 milliGRAM(s) IV Push two times a day  polyethylene glycol 3350 17 Gram(s) Oral two times a day  predniSONE   Tablet 40 milliGRAM(s) Oral daily  senna 2 Tablet(s) Oral at bedtime  sodium chloride 0.65% Nasal 2 Spray(s) Both Nostrils every 1 hour PRN  sodium chloride 0.9% lock flush 10 milliLiter(s) IV Push every 1 hour PRN  traZODone 150 milliGRAM(s) Oral at bedtime       REVIEW OF SYSTEMS:  CONSTITUTIONAL:  No Fever or chills  HEENT:  see history  CARDIOVASCULAR:  No chest pain or SOB  RESPIRATORY:  No cough, shortness of breath  GASTROINTESTINAL:  No nausea, vomiting or diarrhea.  GENITOURINARY:  No dysuria  MUSCULOSKELETAL:  no joint aches, no muscle pain  NEUROLOGIC:  No headache or dizziness  PSYCHIATRIC:  No disorder of thought or mood.     Physical Exam:  ICU Vital Signs Last 24 Hrs  T(C): 37 (09 Jun 2025 15:51), Max: 37.1 (09 Jun 2025 09:24)  T(F): 98.6 (09 Jun 2025 15:51), Max: 98.8 (09 Jun 2025 09:24)  HR: 81 (09 Jun 2025 15:51) (73 - 89)  BP: 113/73 (09 Jun 2025 15:51) (109/67 - 131/77)  BP(mean): --  ABP: --  ABP(mean): --  RR: 18 (09 Jun 2025 15:51) (18 - 19)  SpO2: 93% (09 Jun 2025 15:51) (93% - 97%)    O2 Parameters below as of 09 Jun 2025 15:51  Patient On (Oxygen Delivery Method): room air       GEN: NAD  HEENT: normocephalic and atraumatic.no active drainage from nose  NECK: Supple.   LUNGS: Normal respiratory effort  HEART: Regular rate and rhythm   ABDOMEN: Soft, nontender, and nondistended.    EXTREMITIES: No leg edema.  NEUROLOGIC: grossly intact.  PSYCHIATRIC: Appropriate affect .      Labs:  06-09    136  |  103  |  34[H]  ----------------------------<  99  5.1   |  28  |  0.91    Ca    9.1      09 Jun 2025 08:00  Phos  5.2     06-09  Mg     2.1     06-09                            11.1   14.97 )-----------( 380      ( 09 Jun 2025 08:00 )             34.5       Urinalysis Basic - ( 09 Jun 2025 08:00 )    Color: x / Appearance: x / SG: x / pH: x  Gluc: 99 mg/dL / Ketone: x  / Bili: x / Urobili: x   Blood: x / Protein: x / Nitrite: x   Leuk Esterase: x / RBC: x / WBC x   Sq Epi: x / Non Sq Epi: x / Bacteria: x          RECENT CULTURES:  06-03 @ 16:31 Blood Blood     No growth at 5 days        06-03 @ 16:20 Blood Blood     No growth at 5 days              All imaging and data are reviewed.     Assessment and Plan:       Stephanie Adkins MD  Division of infectious Diseases  Cell [phone] between 8am and 6pm  After 6pm and over the weekends please call ID service line at [phone].     55 minutes spent on total encounter assessing patient, examination, chart review, counseling and coordinating care by the attending physician/nurse/care manager.

## 2025-06-09 NOTE — PROGRESS NOTE ADULT - ASSESSMENT
53yo F with PMHx of asthma/COPD (no home O2), vasculitis, who presents with worsening facial pain and swelling. Symptoms started about 5 months ago with non healing cut on L nares. She said she went to urgent care and diagnosed with MRSA infection. She saw ENT (Dr. Kris Monsalve) 2 months and Rheum (Dr. Luis Manuel Posadas, [facility]) and was diagnosed with vasculitis. She said she had similar episode about 2 months ago and was treated for vasculitis "flare" with oral antibiotics and prednisone.    Patient being treated for sinusitis, but concern for underlying chronic process. Seen by ENT and concern  for chronic sinusitis, rhinitis septal perforation of vascular pathology or [place]. ? if any superimposed infection, although CRP low at 10-->8-->9 so suspect more chronic process.     However given concern for osteomyelitis on left hard palate on MRI, will plan for 6 week course of IV antibiotics. No plan for further intervention per ENT. MRSA nasal PCR positive. WBC increased to 14 today but probably steroid related. Serum Fungitell and galactomannan low. Blood cultures remain no growth. Plan for discharge to rehab.    #Sinusitis  #Osteomyelitis of L hard palate  #Chronic perforation of nasal septum  #MRSA nasal colonization    -continue daptomycin 400mg IV daily until July 15  -continue ceftriaxone 1g IV q24h until July 15  -weekly CBC, CMP, CPK, CRP on discharge  -monitor WBC  -appreciate ENT input  -ID, ENT and Rheum follow up outpatient  -message left for ENT Dr. Monsalve' office   -discussed with Dr. Dora Adkins MD  Division of Infectious Diseases   Cell [phone] between 8am and 6pm   After 6pm and weekends please call ID service at [phone].     35 minutes spent on total encounter assessing patient, examination, chart review, counseling and coordinating care by the attending physician/nurse/care manager.

## 2025-06-09 NOTE — PROGRESS NOTE ADULT - SUBJECTIVE AND OBJECTIVE BOX
INTERVAL HPI/OVERNIGHT EVENTS:  Patient seen and examined at bedside. States she is feeling "fine" but still with severe discomfort in and around her nose. Denies any chest pain, SOB, abd pain at this time. Patient does state she has not had a BM in past few days but does not endorse feeling constipated.    ROS: All other review of systems is negative unless indicated above.    MEDICATIONS  (STANDING):  albuterol/ipratropium for Nebulization 3 milliLiter(s) Nebulizer every 6 hours  cefTRIAXone   IVPB 1000 milliGRAM(s) IV Intermittent every 24 hours  chlorhexidine 4% Liquid 1 Application(s) Topical <User Schedule>  DAPTOmycin IVPB 400 milliGRAM(s) IV Intermittent every 24 hours  enoxaparin Injectable 40 milliGRAM(s) SubCutaneous every 24 hours  gabapentin 300 milliGRAM(s) Oral three times a day  hydrocortisone 1% Cream 1 Application(s) Topical three times a day  HYDROmorphone   Tablet 2 milliGRAM(s) Oral every 4 hours  melatonin 10 milliGRAM(s) Oral at bedtime  mupirocin 2% Nasal 1 Application(s) Both Nostrils two times a day  pantoprazole  Injectable 40 milliGRAM(s) IV Push two times a day  polyethylene glycol 3350 17 Gram(s) Oral two times a day  predniSONE   Tablet 40 milliGRAM(s) Oral daily  senna 2 Tablet(s) Oral at bedtime  traZODone 150 milliGRAM(s) Oral at bedtime    MEDICATIONS  (PRN):  acetaminophen     Tablet .. 650 milliGRAM(s) Oral every 6 hours PRN Temp greater or equal to 38C (100.4F), Mild Pain (1 - 3)  albuterol    90 MICROgram(s) HFA Inhaler 2 Puff(s) Inhalation every 6 hours PRN Bronchospasm  aluminum hydroxide/magnesium hydroxide/simethicone Suspension 30 milliLiter(s) Oral every 4 hours PRN Dyspepsia  HYDROmorphone  Injectable 0.5 milliGRAM(s) IV Push every 4 hours PRN Severe Pain (7 - 10)  ibuprofen  Tablet. 400 milliGRAM(s) Oral every 6 hours PRN Moderate Pain (4 - 6)  ondansetron Injectable 4 milliGRAM(s) IV Push every 8 hours PRN Nausea and/or Vomiting  sodium chloride 0.65% Nasal 2 Spray(s) Both Nostrils every 1 hour PRN Nasal Congestion  sodium chloride 0.9% lock flush 10 milliLiter(s) IV Push every 1 hour PRN Pre/post blood products, medications, blood draw, and to maintain line patency      Allergies    sulfa drugs (Hives; Rash)  sulfa drugs (Hives; Urticaria)    Intolerances           Vital Signs Last 24 Hrs  T(C): 37.1 (09 Jun 2025 20:58), Max: 37.1 (09 Jun 2025 09:24)  T(F): 98.7 (09 Jun 2025 20:58), Max: 98.8 (09 Jun 2025 09:24)  HR: 70 (09 Jun 2025 20:58) (70 - 89)  BP: 120/70 (09 Jun 2025 20:58) (109/67 - 124/75)  BP(mean): --  RR: 18 (09 Jun 2025 20:58) (18 - 19)  SpO2: 99% (09 Jun 2025 20:58) (93% - 99%)    Parameters below as of 09 Jun 2025 20:58  Patient On (Oxygen Delivery Method): room air        06-09 @ 07:01  -  06-09 @ 23:05  --------------------------------------------------------  IN: 100 mL / OUT: 0 mL / NET: 100 mL      Physical Exam:  GENERAL: NAD, sitting comfortably in chair  HEENT:  anicteric, moist mucous membranes; NOSE: left nostril examined with built up granulation tissue and dried blood, no active bleed  CHEST/LUNG:  CTA b/l, no rales, wheezes, or rhonchi  HEART:  RRR, S1, S2  ABDOMEN:  BS+, soft, nontender, nondistended  EXTREMITIES: no edema, cyanosis, or calf tenderness  NERVOUS SYSTEM: answers questions and follows commands appropriately  PSYCH: normal affect      LABS:                        11.1   14.97 )-----------( 380      ( 09 Jun 2025 08:00 )             34.5     06-09    136  |  103  |  34[H]  ----------------------------<  99  5.1   |  28  |  0.91    Ca    9.1      09 Jun 2025 08:00  Phos  5.2     06-09  Mg     2.1     06-09        Urinalysis Basic - ( 09 Jun 2025 08:00 )    Color: x / Appearance: x / SG: x / pH: x  Gluc: 99 mg/dL / Ketone: x  / Bili: x / Urobili: x   Blood: x / Protein: x / Nitrite: x   Leuk Esterase: x / RBC: x / WBC x   Sq Epi: x / Non Sq Epi: x / Bacteria: x        RADIOLOGY & ADDITIONAL TESTS:

## 2025-06-10 LAB
ANION GAP SERPL CALC-SCNC: 9 MMOL/L — SIGNIFICANT CHANGE UP (ref 5–17)
BUN SERPL-MCNC: 29 MG/DL — HIGH (ref 7–23)
CALCIUM SERPL-MCNC: 9 MG/DL — SIGNIFICANT CHANGE UP (ref 8.5–10.1)
CHLORIDE SERPL-SCNC: 100 MMOL/L — SIGNIFICANT CHANGE UP (ref 96–108)
CO2 SERPL-SCNC: 27 MMOL/L — SIGNIFICANT CHANGE UP (ref 22–31)
CREAT SERPL-MCNC: 0.93 MG/DL — SIGNIFICANT CHANGE UP (ref 0.5–1.3)
CRP SERPL-MCNC: 4 MG/L — SIGNIFICANT CHANGE UP
EGFR: 74 ML/MIN/1.73M2 — SIGNIFICANT CHANGE UP
EGFR: 74 ML/MIN/1.73M2 — SIGNIFICANT CHANGE UP
GLUCOSE SERPL-MCNC: 218 MG/DL — HIGH (ref 70–99)
HCT VFR BLD CALC: 34.4 % — LOW (ref 34.5–45)
HGB BLD-MCNC: 11.1 G/DL — LOW (ref 11.5–15.5)
MCHC RBC-ENTMCNC: 30 PG — SIGNIFICANT CHANGE UP (ref 27–34)
MCHC RBC-ENTMCNC: 32.3 G/DL — SIGNIFICANT CHANGE UP (ref 32–36)
MCV RBC AUTO: 93 FL — SIGNIFICANT CHANGE UP (ref 80–100)
NRBC BLD AUTO-RTO: 0 /100 WBCS — SIGNIFICANT CHANGE UP (ref 0–0)
PLATELET # BLD AUTO: 332 K/UL — SIGNIFICANT CHANGE UP (ref 150–400)
POTASSIUM SERPL-MCNC: 4.7 MMOL/L — SIGNIFICANT CHANGE UP (ref 3.5–5.3)
POTASSIUM SERPL-SCNC: 4.7 MMOL/L — SIGNIFICANT CHANGE UP (ref 3.5–5.3)
RBC # BLD: 3.7 M/UL — LOW (ref 3.8–5.2)
RBC # FLD: 13.7 % — SIGNIFICANT CHANGE UP (ref 10.3–14.5)
SODIUM SERPL-SCNC: 136 MMOL/L — SIGNIFICANT CHANGE UP (ref 135–145)
WBC # BLD: 12.4 K/UL — HIGH (ref 3.8–10.5)
WBC # FLD AUTO: 12.4 K/UL — HIGH (ref 3.8–10.5)

## 2025-06-10 PROCEDURE — 99233 SBSQ HOSP IP/OBS HIGH 50: CPT

## 2025-06-10 PROCEDURE — 99232 SBSQ HOSP IP/OBS MODERATE 35: CPT

## 2025-06-10 RX ORDER — HYDROMORPHONE/SOD CHLOR,ISO/PF 2 MG/10 ML
4 SYRINGE (ML) INJECTION EVERY 4 HOURS
Refills: 0 | Status: DISCONTINUED | OUTPATIENT
Start: 2025-06-10 | End: 2025-06-11

## 2025-06-10 RX ADMIN — POLYETHYLENE GLYCOL 3350 17 GRAM(S): 17 POWDER, FOR SOLUTION ORAL at 17:37

## 2025-06-10 RX ADMIN — Medication 150 MILLIGRAM(S): at 21:44

## 2025-06-10 RX ADMIN — PREDNISONE 40 MILLIGRAM(S): 20 TABLET ORAL at 05:23

## 2025-06-10 RX ADMIN — Medication 4 MILLIGRAM(S): at 17:38

## 2025-06-10 RX ADMIN — IPRATROPIUM BROMIDE AND ALBUTEROL SULFATE 3 MILLILITER(S): .5; 2.5 SOLUTION RESPIRATORY (INHALATION) at 19:11

## 2025-06-10 RX ADMIN — Medication 0.5 MILLIGRAM(S): at 09:46

## 2025-06-10 RX ADMIN — ENOXAPARIN SODIUM 40 MILLIGRAM(S): 100 INJECTION SUBCUTANEOUS at 11:44

## 2025-06-10 RX ADMIN — IPRATROPIUM BROMIDE AND ALBUTEROL SULFATE 3 MILLILITER(S): .5; 2.5 SOLUTION RESPIRATORY (INHALATION) at 13:36

## 2025-06-10 RX ADMIN — Medication 40 MILLIGRAM(S): at 05:22

## 2025-06-10 RX ADMIN — GABAPENTIN 300 MILLIGRAM(S): 400 CAPSULE ORAL at 13:41

## 2025-06-10 RX ADMIN — MUPIROCIN CALCIUM 1 APPLICATION(S): 20 CREAM TOPICAL at 05:22

## 2025-06-10 RX ADMIN — Medication 2 MILLIGRAM(S): at 11:44

## 2025-06-10 RX ADMIN — Medication 2 MILLIGRAM(S): at 07:40

## 2025-06-10 RX ADMIN — Medication 1 APPLICATION(S): at 05:23

## 2025-06-10 RX ADMIN — Medication 0.5 MILLIGRAM(S): at 00:41

## 2025-06-10 RX ADMIN — POLYETHYLENE GLYCOL 3350 17 GRAM(S): 17 POWDER, FOR SOLUTION ORAL at 05:22

## 2025-06-10 RX ADMIN — GABAPENTIN 300 MILLIGRAM(S): 400 CAPSULE ORAL at 21:44

## 2025-06-10 RX ADMIN — HYDROCORTISONE 1 APPLICATION(S): 10 CREAM TOPICAL at 13:38

## 2025-06-10 RX ADMIN — Medication 2 MILLIGRAM(S): at 03:47

## 2025-06-10 RX ADMIN — Medication 40 MILLIGRAM(S): at 17:37

## 2025-06-10 RX ADMIN — IPRATROPIUM BROMIDE AND ALBUTEROL SULFATE 3 MILLILITER(S): .5; 2.5 SOLUTION RESPIRATORY (INHALATION) at 01:10

## 2025-06-10 RX ADMIN — Medication 0.5 MILLIGRAM(S): at 06:02

## 2025-06-10 RX ADMIN — Medication 2 TABLET(S): at 21:45

## 2025-06-10 RX ADMIN — Medication 2 MILLIGRAM(S): at 12:00

## 2025-06-10 RX ADMIN — Medication 10 MILLIGRAM(S): at 21:45

## 2025-06-10 RX ADMIN — HYDROCORTISONE 1 APPLICATION(S): 10 CREAM TOPICAL at 05:41

## 2025-06-10 RX ADMIN — HYDROCORTISONE 1 APPLICATION(S): 10 CREAM TOPICAL at 21:44

## 2025-06-10 RX ADMIN — Medication 0.5 MILLIGRAM(S): at 05:23

## 2025-06-10 RX ADMIN — MUPIROCIN CALCIUM 1 APPLICATION(S): 20 CREAM TOPICAL at 17:33

## 2025-06-10 RX ADMIN — CEFTRIAXONE 100 MILLIGRAM(S): 500 INJECTION, POWDER, FOR SOLUTION INTRAMUSCULAR; INTRAVENOUS at 14:57

## 2025-06-10 RX ADMIN — Medication 0.5 MILLIGRAM(S): at 09:31

## 2025-06-10 RX ADMIN — Medication 4 MILLIGRAM(S): at 18:38

## 2025-06-10 RX ADMIN — Medication 0.5 MILLIGRAM(S): at 13:57

## 2025-06-10 RX ADMIN — DAPTOMYCIN 116 MILLIGRAM(S): 500 INJECTION, POWDER, LYOPHILIZED, FOR SOLUTION INTRAVENOUS at 16:59

## 2025-06-10 RX ADMIN — Medication 4 MILLIGRAM(S): at 21:44

## 2025-06-10 RX ADMIN — GABAPENTIN 300 MILLIGRAM(S): 400 CAPSULE ORAL at 05:22

## 2025-06-10 RX ADMIN — Medication 0.5 MILLIGRAM(S): at 13:41

## 2025-06-10 RX ADMIN — Medication 2 MILLIGRAM(S): at 04:47

## 2025-06-10 NOTE — PROGRESS NOTE ADULT - REASON FOR ADMISSION
Sinusitis, pain management

## 2025-06-10 NOTE — PROGRESS NOTE ADULT - SUBJECTIVE AND OBJECTIVE BOX
INTERVAL HPI/OVERNIGHT EVENTS:  Patient seen and examined at bedside. States she still has the nasal congestion and pain. Denies any chest pain, SOB, abd pain at this time.     ROS: All other review of systems is negative unless indicated above.    MEDICATIONS  (STANDING):  albuterol/ipratropium for Nebulization 3 milliLiter(s) Nebulizer every 6 hours  chlorhexidine 4% Liquid 1 Application(s) Topical <User Schedule>  DAPTOmycin IVPB 400 milliGRAM(s) IV Intermittent every 24 hours  enoxaparin Injectable 40 milliGRAM(s) SubCutaneous every 24 hours  gabapentin 300 milliGRAM(s) Oral three times a day  hydrocortisone 1% Cream 1 Application(s) Topical three times a day  HYDROmorphone   Tablet 4 milliGRAM(s) Oral every 4 hours  melatonin 10 milliGRAM(s) Oral at bedtime  mupirocin 2% Nasal 1 Application(s) Both Nostrils two times a day  pantoprazole  Injectable 40 milliGRAM(s) IV Push two times a day  polyethylene glycol 3350 17 Gram(s) Oral two times a day  predniSONE   Tablet 40 milliGRAM(s) Oral daily  senna 2 Tablet(s) Oral at bedtime  traZODone 150 milliGRAM(s) Oral at bedtime    MEDICATIONS  (PRN):  acetaminophen     Tablet .. 650 milliGRAM(s) Oral every 6 hours PRN Temp greater or equal to 38C (100.4F), Mild Pain (1 - 3)  albuterol    90 MICROgram(s) HFA Inhaler 2 Puff(s) Inhalation every 6 hours PRN Bronchospasm  aluminum hydroxide/magnesium hydroxide/simethicone Suspension 30 milliLiter(s) Oral every 4 hours PRN Dyspepsia  ibuprofen  Tablet. 400 milliGRAM(s) Oral every 6 hours PRN Moderate Pain (4 - 6)  ondansetron Injectable 4 milliGRAM(s) IV Push every 8 hours PRN Nausea and/or Vomiting  sodium chloride 0.65% Nasal 2 Spray(s) Both Nostrils every 1 hour PRN Nasal Congestion  sodium chloride 0.9% lock flush 10 milliLiter(s) IV Push every 1 hour PRN Pre/post blood products, medications, blood draw, and to maintain line patency      Allergies    sulfa drugs (Hives; Rash)  sulfa drugs (Hives; Urticaria)    Intolerances            Vital Signs Last 24 Hrs  T(C): 36.7 (10 Camilo 2025 20:40), Max: 36.7 (10 Camilo 2025 11:43)  T(F): 98 (10 Camilo 2025 20:40), Max: 98 (10 Camilo 2025 11:43)  HR: 71 (10 Camilo 2025 20:40) (71 - 89)  BP: 120/71 (10 Camilo 2025 20:40) (102/64 - 123/84)  BP(mean): --  RR: 18 (10 Camilo 2025 20:40) (18 - 18)  SpO2: 94% (10 Camilo 2025 20:40) (92% - 95%)    Parameters below as of 10 Camilo 2025 20:40  Patient On (Oxygen Delivery Method): room air        06-09 @ 07:01  -  06-10 @ 07:00  --------------------------------------------------------  IN: 100 mL / OUT: 0 mL / NET: 100 mL        Physical Exam:  GENERAL: NAD, sitting comfortably in chair  HEENT:  anicteric, moist mucous membranes; NOSE: left nostril examined with built up granulation tissue and dried blood, no active bleed  CHEST/LUNG:  CTA b/l, no rales, wheezes, or rhonchi  HEART:  RRR, S1, S2  ABDOMEN:  BS+, soft, nontender, nondistended  EXTREMITIES: no edema, cyanosis, or calf tenderness  NERVOUS SYSTEM: answers questions and follows commands appropriately      LABS:                        11.1   12.40 )-----------( 332      ( 10 Camilo 2025 08:40 )             34.4     06-10    136  |  100  |  29[H]  ----------------------------<  218[H]  4.7   |  27  |  0.93    Ca    9.0      10 Camilo 2025 08:40  Phos  5.2     06-09  Mg     2.1     06-09        Urinalysis Basic - ( 10 Camilo 2025 08:40 )    Color: x / Appearance: x / SG: x / pH: x  Gluc: 218 mg/dL / Ketone: x  / Bili: x / Urobili: x   Blood: x / Protein: x / Nitrite: x   Leuk Esterase: x / RBC: x / WBC x   Sq Epi: x / Non Sq Epi: x / Bacteria: x        RADIOLOGY & ADDITIONAL TESTS:

## 2025-06-10 NOTE — PROGRESS NOTE ADULT - TIME BILLING
Reviewing chart notes and data, face to face time counseling the patient, coordinating care with SW/CM at [facility].
activities including direct patient care, counseling and/or coordinating care, reviewing notes/lab data/imaging, and discussion with multidisciplinary team

## 2025-06-10 NOTE — PROGRESS NOTE ADULT - SUBJECTIVE AND OBJECTIVE BOX
[facility]  INFECTIOUS DISEASES - [address], [place] [address]  Tel: [phone]     Fax: [phone]  =======================================================    SONU JUDD [id number]    Follow up: No fevers. Had BM today.    Allergies:  sulfa drugs (Hives; Rash)  sulfa drugs (Hives; Urticaria)      Antibiotics:  acetaminophen     Tablet .. 650 milliGRAM(s) Oral every 6 hours PRN  albuterol    90 MICROgram(s) HFA Inhaler 2 Puff(s) Inhalation every 6 hours PRN  albuterol/ipratropium for Nebulization 3 milliLiter(s) Nebulizer every 6 hours  aluminum hydroxide/magnesium hydroxide/simethicone Suspension 30 milliLiter(s) Oral every 4 hours PRN  cefTRIAXone   IVPB 1000 milliGRAM(s) IV Intermittent every 24 hours  chlorhexidine 4% Liquid 1 Application(s) Topical <User Schedule>  DAPTOmycin IVPB 400 milliGRAM(s) IV Intermittent every 24 hours  enoxaparin Injectable 40 milliGRAM(s) SubCutaneous every 24 hours  gabapentin 300 milliGRAM(s) Oral three times a day  hydrocortisone 1% Cream 1 Application(s) Topical three times a day  HYDROmorphone   Tablet 2 milliGRAM(s) Oral every 4 hours  HYDROmorphone  Injectable 0.5 milliGRAM(s) IV Push every 4 hours PRN  ibuprofen  Tablet. 400 milliGRAM(s) Oral every 6 hours PRN  melatonin 10 milliGRAM(s) Oral at bedtime  mupirocin 2% Nasal 1 Application(s) Both Nostrils two times a day  ondansetron Injectable 4 milliGRAM(s) IV Push every 8 hours PRN  pantoprazole  Injectable 40 milliGRAM(s) IV Push two times a day  polyethylene glycol 3350 17 Gram(s) Oral two times a day  predniSONE   Tablet 40 milliGRAM(s) Oral daily  senna 2 Tablet(s) Oral at bedtime  sodium chloride 0.65% Nasal 2 Spray(s) Both Nostrils every 1 hour PRN  sodium chloride 0.9% lock flush 10 milliLiter(s) IV Push every 1 hour PRN  traZODone 150 milliGRAM(s) Oral at bedtime       REVIEW OF SYSTEMS:  CONSTITUTIONAL:  No Fever or chills  HEENT:  + nasal pain  CARDIOVASCULAR:  No chest pain or SOB  RESPIRATORY:  No cough, shortness of breath  GASTROINTESTINAL:  No nausea, vomiting or diarrhea.  GENITOURINARY:  No dysuria  MUSCULOSKELETAL:  no joint aches, no muscle pain  NEUROLOGIC:  No headache or dizziness  PSYCHIATRIC:  No disorder of thought or mood.       Physical Exam:  ICU Vital Signs Last 24 Hrs  T(C): 36.7 (10 Camilo 2025 11:43), Max: 37.1 (09 Jun 2025 20:58)  T(F): 98 (10 Camilo 2025 11:43), Max: 98.7 (09 Jun 2025 20:58)  HR: 84 (10 Camilo 2025 11:43) (70 - 84)  BP: 102/64 (10 Camilo 2025 11:43) (102/64 - 123/84)  BP(mean): --  ABP: --  ABP(mean): --  RR: 18 (10 Camilo 2025 11:43) (18 - 18)  SpO2: 94% (10 Camilo 2025 11:43) (92% - 99%)    O2 Parameters below as of 10 Camilo 2025 11:43  Patient On (Oxygen Delivery Method): room air           GEN: NAD  HEENT: normocephalic and atraumatic.no active drainage from nose  NECK: Supple.   LUNGS: Normal respiratory effort  HEART: Regular rate and rhythm   ABDOMEN: Soft, nontender, and nondistended.    EXTREMITIES: No leg edema.  NEUROLOGIC: grossly intact.  PSYCHIATRIC: Appropriate affect .    Labs:  06-10    136  |  100  |  29[H]  ----------------------------<  218[H]  4.7   |  27  |  0.93    Ca    9.0      10 Camilo 2025 08:40  Phos  5.2     06-09  Mg     2.1     06-09                            11.1   12.40 )-----------( 332      ( 10 Camilo 2025 08:40 )             34.4       Urinalysis Basic - ( 10 Camilo 2025 08:40 )    Color: x / Appearance: x / SG: x / pH: x  Gluc: 218 mg/dL / Ketone: x  / Bili: x / Urobili: x   Blood: x / Protein: x / Nitrite: x   Leuk Esterase: x / RBC: x / WBC x   Sq Epi: x / Non Sq Epi: x / Bacteria: x          RECENT CULTURES:  06-03 @ 16:31 Blood Blood     No growth at 5 days        06-03 @ 16:20 Blood Blood     No growth at 5 days              All imaging and data are reviewed.     < from: MR Facial Bones Only w/wo IV Cont (06.04.25 @ 08:24) >  FINDINGS:  Sinonasal cavities: A defect of the inferior nasal septum is again seen.    Defect/erosions of the left hard palate again seen. There is mild   surrounding marrow edema and enhancement slightly extending across the   midline to the right and extending towardsthe left alveolar process   (9-15).    Moderate mucosal thickening is seen in the left maxillary sinus along the   alveolar recess. No abscess identified.    Mild mucosal thickening in the bilateral anterior ethmoid air cells.    Remaining osseous structures are grossly unremarkable.    Skin and subcutaneous soft tissues: No asymmetrical soft tissue swelling   or hematoma.    Orbits: The globes demonstrate normal contour. The lenses are normally   located. The optic nerve sheath complexes appear intact. There is no   retrobulbar mass. The extraocular muscles appear normal in size    Visualized aerodigestive tract: Normal. There is no abnormal enhancement.    Mastoid air cells: Clear.    Partially visualized intracranial structures: Normal.      IMPRESSION:  Left hard palate defects/erosions with mild surrounding marrow edema and   enhancement. This could represent osteomyelitis or reactive osteitis.   Moderate mucosal thickening in the left inferior maxillary sinus. No   abscess identified.    Nasal septal defect is again seen.    --- End of Report ---      < end of copied text >

## 2025-06-10 NOTE — PROGRESS NOTE ADULT - PROBLEM SELECTOR PLAN 7
DVT ppx: Lovenox 40mg q24hr

## 2025-06-10 NOTE — PROGRESS NOTE ADULT - PROVIDER SPECIALTY LIST ADULT
Infectious Disease
Infectious Disease
Hospitalist
Physiatry
Hospitalist
Hospitalist
Infectious Disease
Hospitalist

## 2025-06-10 NOTE — PROGRESS NOTE ADULT - PROBLEM SELECTOR PLAN 2
Patient follows with Rheum, ENT for recently diagnosed vasculitis??; currently, workup in progress with Rheum to determine which kind - per rheum message to pt - no definitive evidence of vasculitis - will try to reach o/p md  - Continue outpatient follow up with Rheum, ENT  - Autoimmune labs sent but reviewed outpt work up  - Patient with positive c-anca and proteinase 3 antibody assay  - Rest of plan as above

## 2025-06-10 NOTE — SOCIAL WORK PROGRESS NOTE - NSSWPROGRESSNOTE_GEN_ALL_CORE
Pt has no accepting in network [facility] facilities. SW met with pt at bedside to discuss dc planning. Pt's preference is to dc home with home infusion company for IV abx. She prefers [facility] if in network with her insurance. CM notified and to send referral. Pt states that she would complete IV abx at her dtr's home located at [address] [place] [address]. SW to remain available for any needs.

## 2025-06-10 NOTE — PROGRESS NOTE ADULT - PROBLEM SELECTOR PROBLEM 4
Electrolyte abnormality

## 2025-06-10 NOTE — PROGRESS NOTE ADULT - ASSESSMENT
55yo F with PMHx of asthma/COPD (no home O2), vasculitis, who presents with worsening facial pain and swelling. Symptoms started about 5 months ago with non healing cut on L nares. She said she went to urgent care and diagnosed with MRSA infection. She saw ENT (Dr. Kris Monsalve) 2 months and Rheum (Dr. Luis Manuel Posadas, [facility]) and was diagnosed with vasculitis. She said she had similar episode about 2 months ago and was treated for vasculitis "flare" with oral antibiotics and prednisone.    Patient being treated for sinusitis and flare of Wegener's. MRI showed Left hard palate defects/erosions with mild surrounding marrow edema and enhancement, had discussion with ENT and low concern that these are due to infectious osteomyelitis. More likely they are due to vasculitis, and patient would benefit the most from treatment of Wegener's.     Otherwise remain afebrile and WBC improved, leukocytosis probably steroid related. CRP low at 4. Serum Fungitell and galactomannan low. Blood cultures no growth. Patient not accepted to any rehab, given hx of cocaine use and lower clinical suspicion for infectious osteomyelitis will d/c on oral antibiotics until she follows up with ENT.    #Sinusitis  #Granulomatosis with polyangiitis  #? osteomyelitis of L hard palate  #Chronic perforation of nasal septum  #MRSA nasal colonization    -continue daptomycin and ceftriaxone while inpatient, upon discharge switch to doxycycline 100mg PO BID and Augmentin 875mg PO BID x 10 days  -ID, ENT and Rheum follow up outpatient  -discussed with Dr. John and Dr. Gricel Adkins MD  Division of Infectious Diseases   Cell [phone] between 8am and 6pm   After 6pm and weekends please call ID service at [phone].     35 minutes spent on total encounter assessing patient, examination, chart review, counseling and coordinating care by the attending physician/nurse/care manager.

## 2025-06-10 NOTE — PROGRESS NOTE ADULT - PROBLEM SELECTOR PLAN 1
Patient presenting with persistent severe diffuse facial pain, nasal edema, associated with thick mucous nasal discharge x1.5 week, similar to prior vasculitis flare-up 3 mo ago  - No leukocytosis, patient afebrile on admission  - CT maxillofacial showing chronic changes, concern for chronic osteomyelitis  - MR with concern for OM - ID recs long term IV abx - pt agreeable to LITO to facilitate - PICC placed  - Continue rocephin - changing vanco to daptomycin as planned for long course - monitor CK/CMP  - Trend CBC w/ diff, fever curve - leukocytosis likely steroid induced  - ID (Dr. Adkins) consulted, follow up recs  - Pain management with ATC and PRN dilaudid as per pain mgmt - would change PRN to PO  - Added PRN ibuprofen and increased gabapentin to 300mg TID in anticipation of pain regimen deescalation  - GI prophylaxis with IV protonix BID - can change to PO qd if no bleeding  - Add increased bowel regimen on PO narcotics. Miralax BID + senna. Will give dulcolax suppository as well as fleet enema (if suppository does not work)- patient had BM today  - Change solu-medrol to prednisone (7-10d course) - MRI reviewed and d/w ENT consultant  - D/w Dr. Watson - states steroids + iv abx. no role for biopsy at present to check labwork first for wegeners and to follow with ENT on discharge. Pt saw rheum last month - outpt labs reviewd - per outpt rheum no definitive evidence of a vasculitis. Patient has an ENT appt with Pilar in a few weeks - should get biopsy with ENT if they are willing to do for definitive dx - steroid initiation may skew data - although biopsy can be done for OM    -ID discussed with outpatient ENT- Dr. Quezada- he states patient likely has vasculitis flare and unlikely OM- recommend immunosuppressive therapy and outpatient Rheum follow up. I discussed case with patient's outpatient Rheumatologist- Dr. Resendez ([facility])- states would like to have biopsy prior to starting immunosuppressive medications

## 2025-06-10 NOTE — PROGRESS NOTE ADULT - PROBLEM SELECTOR PROBLEM 7
Need for prophylactic measure
no
Need for prophylactic measure

## 2025-06-11 ENCOUNTER — TRANSCRIPTION ENCOUNTER (OUTPATIENT)
Age: 55
End: 2025-06-11

## 2025-06-11 VITALS
OXYGEN SATURATION: 95 % | DIASTOLIC BLOOD PRESSURE: 67 MMHG | SYSTOLIC BLOOD PRESSURE: 131 MMHG | HEART RATE: 89 BPM | TEMPERATURE: 99 F | RESPIRATION RATE: 18 BRPM

## 2025-06-11 LAB
ANION GAP SERPL CALC-SCNC: 6 MMOL/L — SIGNIFICANT CHANGE UP (ref 5–17)
BUN SERPL-MCNC: 31 MG/DL — HIGH (ref 7–23)
CALCIUM SERPL-MCNC: 8.5 MG/DL — SIGNIFICANT CHANGE UP (ref 8.5–10.1)
CHLORIDE SERPL-SCNC: 106 MMOL/L — SIGNIFICANT CHANGE UP (ref 96–108)
CO2 SERPL-SCNC: 28 MMOL/L — SIGNIFICANT CHANGE UP (ref 22–31)
CREAT SERPL-MCNC: 0.82 MG/DL — SIGNIFICANT CHANGE UP (ref 0.5–1.3)
EGFR: 86 ML/MIN/1.73M2 — SIGNIFICANT CHANGE UP
EGFR: 86 ML/MIN/1.73M2 — SIGNIFICANT CHANGE UP
GALACTOMANNAN AG SERPL-ACNC: 0.02 INDEX — SIGNIFICANT CHANGE UP (ref 0–0.49)
GLUCOSE SERPL-MCNC: 130 MG/DL — HIGH (ref 70–99)
HCT VFR BLD CALC: 31.7 % — LOW (ref 34.5–45)
HGB BLD-MCNC: 9.9 G/DL — LOW (ref 11.5–15.5)
MCHC RBC-ENTMCNC: 29.6 PG — SIGNIFICANT CHANGE UP (ref 27–34)
MCHC RBC-ENTMCNC: 31.2 G/DL — LOW (ref 32–36)
MCV RBC AUTO: 94.9 FL — SIGNIFICANT CHANGE UP (ref 80–100)
NRBC BLD AUTO-RTO: 0 /100 WBCS — SIGNIFICANT CHANGE UP (ref 0–0)
PLATELET # BLD AUTO: 291 K/UL — SIGNIFICANT CHANGE UP (ref 150–400)
POTASSIUM SERPL-MCNC: 4.2 MMOL/L — SIGNIFICANT CHANGE UP (ref 3.5–5.3)
POTASSIUM SERPL-SCNC: 4.2 MMOL/L — SIGNIFICANT CHANGE UP (ref 3.5–5.3)
RBC # BLD: 3.34 M/UL — LOW (ref 3.8–5.2)
RBC # FLD: 13.9 % — SIGNIFICANT CHANGE UP (ref 10.3–14.5)
SODIUM SERPL-SCNC: 140 MMOL/L — SIGNIFICANT CHANGE UP (ref 135–145)
WBC # BLD: 12.05 K/UL — HIGH (ref 3.8–10.5)
WBC # FLD AUTO: 12.05 K/UL — HIGH (ref 3.8–10.5)

## 2025-06-11 PROCEDURE — 80061 LIPID PANEL: CPT

## 2025-06-11 PROCEDURE — 85025 COMPLETE CBC W/AUTO DIFF WBC: CPT

## 2025-06-11 PROCEDURE — 83516 IMMUNOASSAY NONANTIBODY: CPT

## 2025-06-11 PROCEDURE — A9579: CPT

## 2025-06-11 PROCEDURE — 99239 HOSP IP/OBS DSCHRG MGMT >30: CPT

## 2025-06-11 PROCEDURE — 83735 ASSAY OF MAGNESIUM: CPT

## 2025-06-11 PROCEDURE — C1751: CPT

## 2025-06-11 PROCEDURE — 83550 IRON BINDING TEST: CPT

## 2025-06-11 PROCEDURE — 80048 BASIC METABOLIC PNL TOTAL CA: CPT

## 2025-06-11 PROCEDURE — 99285 EMERGENCY DEPT VISIT HI MDM: CPT

## 2025-06-11 PROCEDURE — 96374 THER/PROPH/DIAG INJ IV PUSH: CPT

## 2025-06-11 PROCEDURE — 84100 ASSAY OF PHOSPHORUS: CPT

## 2025-06-11 PROCEDURE — 70487 CT MAXILLOFACIAL W/DYE: CPT

## 2025-06-11 PROCEDURE — 86036 ANCA SCREEN EACH ANTIBODY: CPT

## 2025-06-11 PROCEDURE — 85027 COMPLETE CBC AUTOMATED: CPT

## 2025-06-11 PROCEDURE — 76937 US GUIDE VASCULAR ACCESS: CPT

## 2025-06-11 PROCEDURE — 36573 INSJ PICC RS&I 5 YR+: CPT

## 2025-06-11 PROCEDURE — 80202 ASSAY OF VANCOMYCIN: CPT

## 2025-06-11 PROCEDURE — 85652 RBC SED RATE AUTOMATED: CPT

## 2025-06-11 PROCEDURE — 70543 MRI ORBT/FAC/NCK W/O &W/DYE: CPT

## 2025-06-11 PROCEDURE — 94640 AIRWAY INHALATION TREATMENT: CPT

## 2025-06-11 PROCEDURE — 86037 ANCA TITER EACH ANTIBODY: CPT

## 2025-06-11 PROCEDURE — 36415 COLL VENOUS BLD VENIPUNCTURE: CPT

## 2025-06-11 PROCEDURE — 83036 HEMOGLOBIN GLYCOSYLATED A1C: CPT

## 2025-06-11 PROCEDURE — 87641 MR-STAPH DNA AMP PROBE: CPT

## 2025-06-11 PROCEDURE — 80053 COMPREHEN METABOLIC PANEL: CPT

## 2025-06-11 PROCEDURE — 96375 TX/PRO/DX INJ NEW DRUG ADDON: CPT

## 2025-06-11 PROCEDURE — 87040 BLOOD CULTURE FOR BACTERIA: CPT

## 2025-06-11 PROCEDURE — 83540 ASSAY OF IRON: CPT

## 2025-06-11 PROCEDURE — 82728 ASSAY OF FERRITIN: CPT

## 2025-06-11 PROCEDURE — 87640 STAPH A DNA AMP PROBE: CPT

## 2025-06-11 PROCEDURE — 86140 C-REACTIVE PROTEIN: CPT

## 2025-06-11 PROCEDURE — 84702 CHORIONIC GONADOTROPIN TEST: CPT

## 2025-06-11 PROCEDURE — 82550 ASSAY OF CK (CPK): CPT

## 2025-06-11 PROCEDURE — 86038 ANTINUCLEAR ANTIBODIES: CPT

## 2025-06-11 PROCEDURE — 84466 ASSAY OF TRANSFERRIN: CPT

## 2025-06-11 PROCEDURE — 87305 ASPERGILLUS AG IA: CPT

## 2025-06-11 PROCEDURE — 94760 N-INVAS EAR/PLS OXIMETRY 1: CPT

## 2025-06-11 PROCEDURE — 71046 X-RAY EXAM CHEST 2 VIEWS: CPT

## 2025-06-11 PROCEDURE — 87449 NOS EACH ORGANISM AG IA: CPT

## 2025-06-11 RX ORDER — ISOPROPYL ALCOHOL, BENZOCAINE .7; .06 ML/ML; ML/ML
0 SWAB TOPICAL
Qty: 100 | Refills: 1
Start: 2025-06-11

## 2025-06-11 RX ORDER — ALBUTEROL SULFATE 2.5 MG/3ML
3 VIAL, NEBULIZER (ML) INHALATION
Refills: 0
Start: 2025-06-11

## 2025-06-11 RX ORDER — AMOXICILLIN AND CLAVULANATE POTASSIUM 500; 125 MG/1; MG/1
1 TABLET, FILM COATED ORAL EVERY 12 HOURS
Refills: 0 | Status: DISCONTINUED | OUTPATIENT
Start: 2025-06-11 | End: 2025-06-11

## 2025-06-11 RX ORDER — IPRATROPIUM BROMIDE AND ALBUTEROL SULFATE .5; 2.5 MG/3ML; MG/3ML
3 SOLUTION RESPIRATORY (INHALATION)
Qty: 0 | Refills: 0 | DISCHARGE

## 2025-06-11 RX ORDER — SODIUM CHLORIDE 0.65 %
2 AEROSOL, SPRAY (ML) NASAL
Qty: 0 | Refills: 0 | DISCHARGE
Start: 2025-06-11

## 2025-06-11 RX ORDER — SENNA 187 MG
2 TABLET ORAL
Qty: 60 | Refills: 0
Start: 2025-06-11 | End: 2025-07-10

## 2025-06-11 RX ORDER — POLYETHYLENE GLYCOL 3350 17 G/17G
17 POWDER, FOR SOLUTION ORAL
Qty: 1020 | Refills: 0
Start: 2025-06-11 | End: 2025-07-10

## 2025-06-11 RX ORDER — ALBUTEROL SULFATE 2.5 MG/3ML
2 VIAL, NEBULIZER (ML) INHALATION
Qty: 0 | Refills: 0 | DISCHARGE

## 2025-06-11 RX ORDER — OXYCODONE HYDROCHLORIDE 30 MG/1
1 TABLET ORAL
Qty: 30 | Refills: 0
Start: 2025-06-11 | End: 2025-06-15

## 2025-06-11 RX ORDER — DOXYCYCLINE HYCLATE 100 MG
1 TABLET ORAL
Qty: 20 | Refills: 0
Start: 2025-06-11 | End: 2025-06-20

## 2025-06-11 RX ORDER — ACETAMINOPHEN 500 MG/5ML
2 LIQUID (ML) ORAL
Qty: 0 | Refills: 0 | DISCHARGE
Start: 2025-06-11

## 2025-06-11 RX ORDER — HYDROMORPHONE/SOD CHLOR,ISO/PF 2 MG/10 ML
1 SYRINGE (ML) INJECTION
Qty: 30 | Refills: 0
Start: 2025-06-11 | End: 2025-06-15

## 2025-06-11 RX ORDER — AMOXICILLIN AND CLAVULANATE POTASSIUM 500; 125 MG/1; MG/1
1 TABLET, FILM COATED ORAL
Qty: 20 | Refills: 0
Start: 2025-06-11 | End: 2025-06-20

## 2025-06-11 RX ORDER — IPRATROPIUM BROMIDE AND ALBUTEROL SULFATE .5; 2.5 MG/3ML; MG/3ML
3 SOLUTION RESPIRATORY (INHALATION)
Qty: 56 | Refills: 0
Start: 2025-06-11 | End: 2025-06-24

## 2025-06-11 RX ORDER — GABAPENTIN 400 MG/1
1 CAPSULE ORAL
Qty: 42 | Refills: 0
Start: 2025-06-11 | End: 2025-06-24

## 2025-06-11 RX ORDER — PREDNISONE 20 MG/1
4 TABLET ORAL
Qty: 52 | Refills: 0
Start: 2025-06-11 | End: 2025-06-23

## 2025-06-11 RX ORDER — HYDROCORTISONE 10 MG/G
1 CREAM TOPICAL
Qty: 1 | Refills: 0
Start: 2025-06-11 | End: 2025-06-20

## 2025-06-11 RX ORDER — NALOXONE HYDROCHLORIDE 0.4 MG/ML
1 INJECTION, SOLUTION INTRAMUSCULAR; INTRAVENOUS; SUBCUTANEOUS
Qty: 1 | Refills: 0
Start: 2025-06-11 | End: 2025-06-11

## 2025-06-11 RX ORDER — LACTOBACILLUS ACIDOPHILUS/PECT 75 MM-100
1 CAPSULE ORAL
Qty: 60 | Refills: 0
Start: 2025-06-11 | End: 2025-07-10

## 2025-06-11 RX ORDER — HYDROMORPHONE/SOD CHLOR,ISO/PF 2 MG/10 ML
2 SYRINGE (ML) INJECTION
Qty: 60 | Refills: 0
Start: 2025-06-11 | End: 2025-06-15

## 2025-06-11 RX ADMIN — Medication 4 MILLIGRAM(S): at 14:25

## 2025-06-11 RX ADMIN — Medication 4 MILLIGRAM(S): at 10:16

## 2025-06-11 RX ADMIN — Medication 40 MILLIGRAM(S): at 06:04

## 2025-06-11 RX ADMIN — ENOXAPARIN SODIUM 40 MILLIGRAM(S): 100 INJECTION SUBCUTANEOUS at 11:50

## 2025-06-11 RX ADMIN — HYDROCORTISONE 1 APPLICATION(S): 10 CREAM TOPICAL at 13:09

## 2025-06-11 RX ADMIN — IPRATROPIUM BROMIDE AND ALBUTEROL SULFATE 3 MILLILITER(S): .5; 2.5 SOLUTION RESPIRATORY (INHALATION) at 02:16

## 2025-06-11 RX ADMIN — Medication 4 MILLIGRAM(S): at 06:04

## 2025-06-11 RX ADMIN — PREDNISONE 40 MILLIGRAM(S): 20 TABLET ORAL at 06:05

## 2025-06-11 RX ADMIN — Medication 4 MILLIGRAM(S): at 01:41

## 2025-06-11 RX ADMIN — GABAPENTIN 300 MILLIGRAM(S): 400 CAPSULE ORAL at 13:09

## 2025-06-11 RX ADMIN — GABAPENTIN 300 MILLIGRAM(S): 400 CAPSULE ORAL at 06:05

## 2025-06-11 RX ADMIN — Medication 4 MILLIGRAM(S): at 11:16

## 2025-06-11 RX ADMIN — Medication 1 APPLICATION(S): at 06:05

## 2025-06-11 RX ADMIN — POLYETHYLENE GLYCOL 3350 17 GRAM(S): 17 POWDER, FOR SOLUTION ORAL at 06:04

## 2025-06-11 RX ADMIN — HYDROCORTISONE 1 APPLICATION(S): 10 CREAM TOPICAL at 06:03

## 2025-06-11 RX ADMIN — IPRATROPIUM BROMIDE AND ALBUTEROL SULFATE 3 MILLILITER(S): .5; 2.5 SOLUTION RESPIRATORY (INHALATION) at 07:30

## 2025-06-11 RX ADMIN — MUPIROCIN CALCIUM 1 APPLICATION(S): 20 CREAM TOPICAL at 06:04

## 2025-06-11 RX ADMIN — IPRATROPIUM BROMIDE AND ALBUTEROL SULFATE 3 MILLILITER(S): .5; 2.5 SOLUTION RESPIRATORY (INHALATION) at 13:39

## 2025-06-11 RX ADMIN — Medication 4 MILLIGRAM(S): at 15:00

## 2025-06-11 NOTE — DISCHARGE NOTE PROVIDER - CARE PROVIDERS DIRECT ADDRESSES
[email or web address]

## 2025-06-11 NOTE — DISCHARGE NOTE PROVIDER - HOSPITAL COURSE
ADMISSION DATE:  06-03-25    ---  FROM ADMISSION H+P:   HPI:  53yo F with PMHx of asthma/COPD (no home O2), vasculitis presents to the ED with persistent severe diffuse facial pain, nasal edema, associated with thick mucous nasal discharge x1.5 week. Patient describes her facial pain as deep bone pain and soft tissue pain, stating she feels pain starts in her nose and radiates to her gums. Patient states she feels this is vasculitis flare-up and feels similar to her only prior flare-up 3 months ago. Patient has been using daily nasal saline as recommended. Most recently, she went to Urgent Care 5 days ago for the pain, where she was prescribed doxycycline 100mg BID (completed 5-days of doxy so far). Patient admits to trying to remove scabs in nares with this flare up. Patient reports dilaudid helped bring pain down from 10/10 to 6/10, but pain is creeping up again. She reports she has Hx of asthma and usually uses inhalers PRN not daily, but has been using them 2-3x a day over the past 1.5 wks since the flare-up started. Patient also states she feels like is having discomfort with swallowing, intermittent blurry vision associated with the flare-ups; denies blurry vision at this time. She endorses occasional focal rashes, palpitations in the past, but denies any rashes at this time. Endorses diarrhea yesterday evening; denies fever, chills, nausea, vomiting. Patient follows with Rheum, ENT for recently diagnosed vasculitis; currently, workup in progress with Rheum to determine which kind; she states that her last visit was 1-2 mo ago. Patient endorses FHx of autoimmune conditions; she states her daughter is currently undergoing workup for SLE.    Denies chest pain, SOB, cough, abdominal pain, nausea, vomiting, urinary frequency, urgency, dysuria, changes in vision, dizziness, numbness, tingling.    Denies recent international travel or sick contacts.    ED Course:   Vitals: BP: 129/85 , HR: 88 , Temp: 98.8, RR: 20 , SpO2: 94% on RA   Labs:   WBCs 9.59, H/H 11.4/34.1, BUN/Cr 17/0.67, Na 142, K 3.3  CT maxillofacial w/ IV cont:   1.  Chronic perforation of the nasal septum, with erosion of the left   inferior turbinate.  2.  Chronic erosive changes in the left hard palate, with improved   phlegmon in the soft palate.  3.  Correlate clinically to exclude chronic osteomyelitis.  4.  Consider maxillofacial MRI with gadolinium.    Received in the ED: Augmentin 875mg PO x1, Solumedrol 125mg IVP x1, dilaudid 1mg IVP x1, morphine 4mg IVP x1, Percocet 5mg-325mg PO x1, Mg 2g x1, KCl 40 mEq x1, Duonebs x1   (03 Jun 2025 00:33)      ---  HOSPITAL COURSE/PERTINENT LABS/PROCEDURES PERFORMED/PENDING TESTS:  Patient admitted to medicine for sinusitis, cocnern for chronic OM and vasculitis. Patient evaluated by ENT, ID and pain management throughout hospital stay. Patient started on IV antibiotics per ID. Also started on steroids per ENT recs. Patient was originally planned for long course of IV abx, but after discussion with patient's outpatient ENT (Dr. Quezada)- symptoms likely related to patient's vasculitis (Wegners?) rather than infectious OM- plan to switch to oral abx with close outpatient follow up with PCP, ENT, Rheumatology, pain management.     Patient seen and examined at bedside on day of discharge. States she is feeling well, denies any chest pain, SOB, abd pain. Nasal pain overall controlled with pain medications. Patient's BG on Am labs noted to be elevated- likely steroid induced- will have DM educator speak with patient prior to dc and refer to outpatient endocrinology.    ---  PATIENT CONDITION:  - stable    ---  PHYSICAL EXAM ON DAY OF DISCHARGE:  GENERAL: NAD, sitting comfortably in bed  HEENT:  anicteric, moist mucous membranes; NOSE: left nostril examined with built up granulation tissue and dried blood, no active bleed  CHEST/LUNG:  CTA b/l, no rales, wheezes, or rhonchi  HEART:  RRR, S1, S2  ABDOMEN:  BS+, soft, nontender, nondistended  EXTREMITIES: no edema, cyanosis, or calf tenderness  NERVOUS SYSTEM: answers questions and follows commands appropriately      ---  CONSULTANTS:   Dr. Watson (ENT)  Dr. Adkins (ID)  Dr. Langley (PMR)  Arsenio Gan NP (DM Educator)    ---  TIME SPENT:  I, the attending physician, was physically present for the key portions of the evaluation and management (E/M) service provided. The total amount of time spent reviewing the hospital notes, laboratory values, imaging findings, assessing/counseling the patient, discussing with consultant physicians, social work, nursing staff was 44 minutes

## 2025-06-11 NOTE — CHART NOTE - NSCHARTNOTEFT_GEN_A_CORE
Removal of PICC line  R arm antecubital region cleaned with Alcohol swabs prior to removal. PICC line removed and pressure applied for ~10 minutes with gauze. Hemastasis achieved, no signs of active bleeding noted. Gauze/tape used to create dressing to maintain pressure on PICC line site. Patient tolerated well without complications. Removal of PICC line  R arm antecubital region cleaned prior to removal. PICC line removed and pressure applied for ~10 minutes with gauze. Hemastasis achieved, no signs of active bleeding noted. Gauze/tape used to create dressing to maintain pressure on PICC line site. Patient tolerated well without complications.

## 2025-06-11 NOTE — DISCHARGE NOTE PROVIDER - NSDCFUSCHEDAPPT_GEN_ALL_CORE_FT
[facility]  OTOLARYNG [address]  Scheduled Appointment: 06/17/2025    [facility]  [address]  Scheduled Appointment: 08/26/2025

## 2025-06-11 NOTE — CASE MANAGEMENT PROGRESS NOTE - NSCMPROGRESSNOTE_GEN_ALL_CORE
Provided patient with a ist of pain management physicians. Instructed patient to call her insurance company [employer] to assist with in network pain management physicians.

## 2025-06-11 NOTE — DISCHARGE NOTE PROVIDER - NSDCCPCAREPLAN_GEN_ALL_CORE_FT
PRINCIPAL DISCHARGE DIAGNOSIS  Diagnosis: Sinusitis  Assessment and Plan of Treatment: Complete course of antibiotics as prescribed. Follow up with ENT, Dr. Monsalve, within 1 week of discharge for possible biopsy. If experiencing severe pain, recurrent fevers/chills, return to the Emergency Department      SECONDARY DISCHARGE DIAGNOSES  Diagnosis: Vasculitis  Assessment and Plan of Treatment: Suspect Wegners. Please continue steroid taper as prescribed and follow up with Rheumatology within 1-2 weeks of discharge for further workup and to discuss immunosuppressive therapy.    Diagnosis: Pain management  Assessment and Plan of Treatment: You are being sent home on oral narcotics for your severe pain due to sinusitis/vasculitis. Please follow up with pain management within 1 week of discharge. Please limit narcotic use as able.

## 2025-06-11 NOTE — DISCHARGE NOTE NURSING/CASE MANAGEMENT/SOCIAL WORK - PATIENT PORTAL LINK FT
You can access the FollowMyHealth Patient Portal offered by [facility] by registering at the following website: [email or web address]. By joining [employer]’s FollowMyHealth portal, you will also be able to view your health information using other applications (apps) compatible with our system.

## 2025-06-11 NOTE — DISCHARGE NOTE NURSING/CASE MANAGEMENT/SOCIAL WORK - FINANCIAL ASSISTANCE
[facility] provides services at a reduced cost to those who are determined to be eligible through [facility]’s financial assistance program. Information regarding [facility]’s financial assistance program can be found by going to [email or web address] or by calling [phone].

## 2025-06-11 NOTE — DISCHARGE NOTE PROVIDER - CARE PROVIDER_API CALL
Kris Monsalve  Otolaryngology  [address], [address]  [place] [address]  Phone: [phone]  Fax: [phone]  Follow Up Time: 1 week    Kasey Suarez  Rheumatology  [address]  [place] [address]  Phone: [phone]  Fax: [phone]  Follow Up Time: 1 week    Perlman, Craig Douglas  Endocrinology/Metab/Diabetes  [address], [address]  [place] [address]  Phone: [phone]  Fax: [phone]  Follow Up Time:     Stephanie Adkins  Internal Medicine  [address] [place] [address]  Phone: [phone]  Fax: [phone]  Follow Up Time:

## 2025-06-11 NOTE — DISCHARGE NOTE PROVIDER - NSDCMRMEDTOKEN_GEN_ALL_CORE_FT
acetaminophen 325 mg oral tablet: 2 tab(s) orally every 6 hours As needed Temp greater or equal to 38C (100.4F), Mild Pain (1 - 3)  Albuterol (Eqv-ProAir HFA) 90 mcg/inh inhalation aerosol: 2 puff(s) inhaled every 6 hours as needed for  bronchospasm  amoxicillin-clavulanate 875 mg-125 mg oral tablet: 1 tab(s) orally every 12 hours  doxycycline hyclate 100 mg oral tablet: 1 tab(s) orally 2 times a day  gabapentin 300 mg oral capsule: 1 cap(s) orally 3 times a day  hydrocortisone 1% topical cream: Apply topically to affected area 3 times a day  HYDROmorphone 4 mg oral tablet: 1 tab(s) orally every 4 hours as needed for  severe pain MDD: 6 tabs  ipratropium-albuterol 0.5 mg-2.5 mg/3 mL inhalation solution: 3 milliliter(s) by nebulizer every 6 hours as needed for  bronchospasm  lactobacillus acidophilus oral tablet: 1 tab(s) orally 2 times a day  Narcan 4 mg/0.1 mL nasal spray: 1 spray(s) intranasally once a day as needed for  opioid overdose  pantoprazole 40 mg oral delayed release tablet: 1 tab(s) orally 2 times a day  polyethylene glycol 3350 oral powder for reconstitution: 17 gram(s) orally 2 times a day  predniSONE 10 mg oral tablet: 4 tab(s) orally once a day Please take prednisone 40mg x5 days, then continue prednisone 30mg x5 days, then continue prednisone 20mg x5 days, then continue prednisone 10mg x5 days  senna leaf extract oral tablet: 2 tab(s) orally once a day (at bedtime)  sodium chloride 0.65% nasal spray: 2 spray(s) nasal every 2 hours

## 2025-06-11 NOTE — PHARMACOTHERAPY INTERVENTION NOTE - COMMENTS
Rx Written	Rx Dispensed	Drug	                                          Quantity   Days Suppl         Prescriber Name            Prescriber SIVAKUMAR #	Payment Method	Dispenser  04/24/2025	04/27/2025	dextroamp-amphetamin 30 mg tab	60	30	       Minerva Walton	       [id number]                  Medicaid	[id number]  03/20/2025	03/29/2025	dextroamp-amphetamin 30 mg tab	60	30	       IglanoMinerva rodas	       [id number]	            Medicaid	[id number]  02/20/2025	02/25/2025	dextroamp-amphetamin 30 mg tab	60	30	       IglanoMinerva rodas	       [id number]	            Medicaid	[id number]  01/23/2025	01/23/2025	dextroamp-amphetamin 30 mg tab	60	30	       IglanoMinerva rodas	       [id number]	            Medicaid	[id number]  12/19/2024	12/23/2024	dextroamp-amphetamin 30 mg tab	60	30	       CollinanoMinerva rodas	       [id number]	            Medicaid	[id number]  12/20/2024	12/21/2024	tramadol hcl 25 mg tablet	                 8	 2	       Jonelle Shin	                       [id number]	            [id number]  11/26/2024	11/26/2024	dextroamp-amphetamin 30 mg tab	60	30	       IglanoMinerva rodas	       [id number]	            Medicaid	[id number]  10/29/2024	10/29/2024	dextroamp-amphetamin 30 mg tab	60	30	       IglanoMinerva rodas	       [id number]	            Medicaid	[id number]  10/16/2024	10/16/2024	oxycodone-acetaminophen 5-325 mg tab 6	2	       Catalina Armstrong	       [id number]	            Medicaid	[id number]  10/09/2024	10/09/2024	oxycodone-acetaminophen 5-325 mg tab 10	2	       Minesh Mcknight	                       [id number]	            [id number]  09/30/2024	10/01/2024	dextroamp-amphetamin 30 mg tab	60	30	       IglanoMinerva rodas	       [id number]	            Medicaid	[id number]  08/27/2024	08/27/2024	dextroamp-amphetamin 30 mg tab	60	30	       Iglanova, Minerva PA	       [id number]	            Medicaid	[id number]  07/29/2024	07/30/2024	dextroamp-amphetamin 30 mg tab	60	30	       Minerva Walton	       [id number]	            Medicaid	[id number]  07/01/2024	07/01/2024	dextroamp-amphetamin 30 mg tab	60	30	       Minerva Walton	       [id number]	            Medicaid	[id number]    
Patient is a 54y F presenting with sinusitis being treated empirically with daptomycin and ceftriaxone. Discussed with Dr. John, order for ceftriaxone auto-completed following the dose on 6/10. Recommended transition to Augmentin 875mg BID per ID recommendations, accepted and order entered.    Jonelle English, PharmD  Clinical Pharmacy Specialist   [phone] or Teams

## 2025-06-11 NOTE — DISCHARGE NOTE PROVIDER - PROVIDER TOKENS
PROVIDER:[TOKEN:[[id number]],FOLLOWUP:[1 week]],PROVIDER:[TOKEN:[[id number]],FOLLOWUP:[1 week]],PROVIDER:[TOKEN:[[id number]]],PROVIDER:[TOKEN:[[id number]]]

## 2025-06-12 ENCOUNTER — NON-APPOINTMENT (OUTPATIENT)
Age: 55
End: 2025-06-12

## 2025-06-12 ENCOUNTER — EMERGENCY (EMERGENCY)
Facility: HOSPITAL | Age: 55
LOS: 1 days | End: 2025-06-12
Attending: EMERGENCY MEDICINE | Admitting: EMERGENCY MEDICINE
Payer: MEDICAID

## 2025-06-12 VITALS
DIASTOLIC BLOOD PRESSURE: 64 MMHG | HEART RATE: 92 BPM | RESPIRATION RATE: 16 BRPM | TEMPERATURE: 98 F | WEIGHT: 179.9 LBS | OXYGEN SATURATION: 95 % | SYSTOLIC BLOOD PRESSURE: 99 MMHG | HEIGHT: 64 IN

## 2025-06-12 VITALS
DIASTOLIC BLOOD PRESSURE: 74 MMHG | HEART RATE: 80 BPM | OXYGEN SATURATION: 98 % | RESPIRATION RATE: 17 BRPM | SYSTOLIC BLOOD PRESSURE: 121 MMHG | TEMPERATURE: 98 F

## 2025-06-12 DIAGNOSIS — Z98.890 OTHER SPECIFIED POSTPROCEDURAL STATES: Chronic | ICD-10-CM

## 2025-06-12 DIAGNOSIS — Z98.51 TUBAL LIGATION STATUS: Chronic | ICD-10-CM

## 2025-06-12 DIAGNOSIS — Z98.89 OTHER SPECIFIED POSTPROCEDURAL STATES: Chronic | ICD-10-CM

## 2025-06-12 LAB
A1C WITH ESTIMATED AVERAGE GLUCOSE RESULT: 5.7 % — HIGH (ref 4–5.6)
ALBUMIN SERPL ELPH-MCNC: 3.1 G/DL — LOW (ref 3.3–5)
ALP SERPL-CCNC: 56 U/L — SIGNIFICANT CHANGE UP (ref 40–120)
ALT FLD-CCNC: 43 U/L — SIGNIFICANT CHANGE UP (ref 12–78)
ANION GAP SERPL CALC-SCNC: 6 MMOL/L — SIGNIFICANT CHANGE UP (ref 5–17)
APPEARANCE UR: CLEAR — SIGNIFICANT CHANGE UP
AST SERPL-CCNC: 13 U/L — LOW (ref 15–37)
BASOPHILS # BLD AUTO: 0.04 K/UL — SIGNIFICANT CHANGE UP (ref 0–0.2)
BASOPHILS NFR BLD AUTO: 0.4 % — SIGNIFICANT CHANGE UP (ref 0–2)
BILIRUB SERPL-MCNC: 0.4 MG/DL — SIGNIFICANT CHANGE UP (ref 0.2–1.2)
BILIRUB UR-MCNC: NEGATIVE — SIGNIFICANT CHANGE UP
BUN SERPL-MCNC: 22 MG/DL — SIGNIFICANT CHANGE UP (ref 7–23)
CALCIUM SERPL-MCNC: 9.2 MG/DL — SIGNIFICANT CHANGE UP (ref 8.5–10.1)
CHLORIDE SERPL-SCNC: 105 MMOL/L — SIGNIFICANT CHANGE UP (ref 96–108)
CO2 SERPL-SCNC: 25 MMOL/L — SIGNIFICANT CHANGE UP (ref 22–31)
COLOR SPEC: YELLOW — SIGNIFICANT CHANGE UP
CREAT SERPL-MCNC: 0.84 MG/DL — SIGNIFICANT CHANGE UP (ref 0.5–1.3)
DIFF PNL FLD: NEGATIVE — SIGNIFICANT CHANGE UP
EGFR: 84 ML/MIN/1.73M2 — SIGNIFICANT CHANGE UP
EGFR: 84 ML/MIN/1.73M2 — SIGNIFICANT CHANGE UP
EOSINOPHIL # BLD AUTO: 0.06 K/UL — SIGNIFICANT CHANGE UP (ref 0–0.5)
EOSINOPHIL NFR BLD AUTO: 0.6 % — SIGNIFICANT CHANGE UP (ref 0–6)
ESTIMATED AVERAGE GLUCOSE: 117 MG/DL — HIGH (ref 68–114)
GLUCOSE SERPL-MCNC: 135 MG/DL — HIGH (ref 70–99)
GLUCOSE UR QL: NEGATIVE MG/DL — SIGNIFICANT CHANGE UP
HCT VFR BLD CALC: 35.9 % — SIGNIFICANT CHANGE UP (ref 34.5–45)
HGB BLD-MCNC: 11.5 G/DL — SIGNIFICANT CHANGE UP (ref 11.5–15.5)
IMM GRANULOCYTES NFR BLD AUTO: 1.5 % — HIGH (ref 0–0.9)
KETONES UR QL: NEGATIVE MG/DL — SIGNIFICANT CHANGE UP
LEUKOCYTE ESTERASE UR-ACNC: NEGATIVE — SIGNIFICANT CHANGE UP
LIDOCAIN IGE QN: 25 U/L — SIGNIFICANT CHANGE UP (ref 13–75)
LYMPHOCYTES # BLD AUTO: 0.82 K/UL — LOW (ref 1–3.3)
LYMPHOCYTES # BLD AUTO: 7.6 % — LOW (ref 13–44)
MCHC RBC-ENTMCNC: 29.5 PG — SIGNIFICANT CHANGE UP (ref 27–34)
MCHC RBC-ENTMCNC: 32 G/DL — SIGNIFICANT CHANGE UP (ref 32–36)
MCV RBC AUTO: 92.1 FL — SIGNIFICANT CHANGE UP (ref 80–100)
MONOCYTES # BLD AUTO: 0.31 K/UL — SIGNIFICANT CHANGE UP (ref 0–0.9)
MONOCYTES NFR BLD AUTO: 2.9 % — SIGNIFICANT CHANGE UP (ref 2–14)
NEUTROPHILS # BLD AUTO: 9.4 K/UL — HIGH (ref 1.8–7.4)
NEUTROPHILS NFR BLD AUTO: 87 % — HIGH (ref 43–77)
NITRITE UR-MCNC: NEGATIVE — SIGNIFICANT CHANGE UP
NRBC BLD AUTO-RTO: 0 /100 WBCS — SIGNIFICANT CHANGE UP (ref 0–0)
PH UR: 7.5 — SIGNIFICANT CHANGE UP (ref 5–8)
PLATELET # BLD AUTO: 327 K/UL — SIGNIFICANT CHANGE UP (ref 150–400)
POTASSIUM SERPL-MCNC: 5 MMOL/L — SIGNIFICANT CHANGE UP (ref 3.5–5.3)
POTASSIUM SERPL-SCNC: 5 MMOL/L — SIGNIFICANT CHANGE UP (ref 3.5–5.3)
PROT SERPL-MCNC: 6.9 G/DL — SIGNIFICANT CHANGE UP (ref 6–8.3)
PROT UR-MCNC: NEGATIVE MG/DL — SIGNIFICANT CHANGE UP
RBC # BLD: 3.9 M/UL — SIGNIFICANT CHANGE UP (ref 3.8–5.2)
RBC # FLD: 13.9 % — SIGNIFICANT CHANGE UP (ref 10.3–14.5)
SODIUM SERPL-SCNC: 136 MMOL/L — SIGNIFICANT CHANGE UP (ref 135–145)
SP GR SPEC: 1.02 — SIGNIFICANT CHANGE UP (ref 1–1.03)
UROBILINOGEN FLD QL: 0.2 MG/DL — SIGNIFICANT CHANGE UP (ref 0.2–1)
WBC # BLD: 10.79 K/UL — HIGH (ref 3.8–10.5)
WBC # FLD AUTO: 10.79 K/UL — HIGH (ref 3.8–10.5)

## 2025-06-12 PROCEDURE — 74177 CT ABD & PELVIS W/CONTRAST: CPT

## 2025-06-12 PROCEDURE — 80053 COMPREHEN METABOLIC PANEL: CPT

## 2025-06-12 PROCEDURE — 83690 ASSAY OF LIPASE: CPT

## 2025-06-12 PROCEDURE — 96375 TX/PRO/DX INJ NEW DRUG ADDON: CPT | Mod: XU

## 2025-06-12 PROCEDURE — 81003 URINALYSIS AUTO W/O SCOPE: CPT

## 2025-06-12 PROCEDURE — 85025 COMPLETE CBC W/AUTO DIFF WBC: CPT

## 2025-06-12 PROCEDURE — 74177 CT ABD & PELVIS W/CONTRAST: CPT | Mod: 26

## 2025-06-12 PROCEDURE — 36415 COLL VENOUS BLD VENIPUNCTURE: CPT

## 2025-06-12 PROCEDURE — 99284 EMERGENCY DEPT VISIT MOD MDM: CPT | Mod: 25

## 2025-06-12 PROCEDURE — 96374 THER/PROPH/DIAG INJ IV PUSH: CPT | Mod: XU

## 2025-06-12 PROCEDURE — 99285 EMERGENCY DEPT VISIT HI MDM: CPT

## 2025-06-12 RX ORDER — ONDANSETRON HCL/PF 4 MG/2 ML
1 VIAL (ML) INJECTION
Qty: 20 | Refills: 0
Start: 2025-06-12 | End: 2025-06-16

## 2025-06-12 RX ORDER — ONDANSETRON HCL/PF 4 MG/2 ML
4 VIAL (ML) INJECTION ONCE
Refills: 0 | Status: COMPLETED | OUTPATIENT
Start: 2025-06-12 | End: 2025-06-12

## 2025-06-12 RX ORDER — HYDROMORPHONE/SOD CHLOR,ISO/PF 2 MG/10 ML
0.5 SYRINGE (ML) INJECTION ONCE
Refills: 0 | Status: DISCONTINUED | OUTPATIENT
Start: 2025-06-12 | End: 2025-06-12

## 2025-06-12 RX ORDER — ACETAMINOPHEN 500 MG/5ML
1000 LIQUID (ML) ORAL ONCE
Refills: 0 | Status: COMPLETED | OUTPATIENT
Start: 2025-06-12 | End: 2025-06-12

## 2025-06-12 RX ADMIN — Medication 4 MILLIGRAM(S): at 16:40

## 2025-06-12 RX ADMIN — Medication 0.5 MILLIGRAM(S): at 16:40

## 2025-06-12 RX ADMIN — Medication 400 MILLIGRAM(S): at 16:41

## 2025-06-12 RX ADMIN — Medication 1000 MILLILITER(S): at 16:40

## 2025-06-12 NOTE — ED PROVIDER NOTE - CLINICAL SUMMARY MEDICAL DECISION MAKING FREE TEXT BOX
Lower abdominal pain with nausea and vomiting x 1 day in this young woman with vasculitis here now requiring thorough evaluation to be performed in an independent manner followed by routine labs such as CBC chemistries lipase as well as CT scan of the abdomen and pelvis IV fluids and medications.

## 2025-06-12 NOTE — ED PROVIDER NOTE - PHYSICAL EXAMINATION
Examination reveals a well-developed well-nourished white female in no acute distress at the present time with clear lungs normal heart sounds and a protuberant abdomen that was mildly tender to deep palpation left and right lower quadrants without any guarding or rebound normoactive bowel sounds extremities without clubbing cyanosis or edema neurologic evaluation shows patient awake and alert oriented x 4 with no focal neurologic deficits.

## 2025-06-12 NOTE — ED PROVIDER NOTE - CARE PROVIDER_API CALL
Ar Delgado  Gastroenterology  [address] [place] [address]  Phone: [phone]  Fax: [phone]  Follow Up Time:

## 2025-06-12 NOTE — ED PROVIDER NOTE - PATIENT PORTAL LINK FT
You can access the FollowMyHealth Patient Portal offered by [facility] by registering at the following website: [email or web address]. By joining [employer]’s FollowMyHealth portal, you will also be able to view your health information using other applications (apps) compatible with our system.

## 2025-06-12 NOTE — ED PROVIDER NOTE - OBJECTIVE STATEMENT
54-year-old female with history of asthma ADHD and vasculitis on daily prednisone recently hospitalized for same well up until yesterday when she began to develop unexplained nausea followed this morning by increased nausea vomiting and then lower abdominal pain without any radiation.  No diarrhea no fever no chills no cough no shortness of breath no dysuria or hematuria.  No history of any abdominal surgery other than a laparoscopic hysterectomy.

## 2025-06-12 NOTE — ED ADULT NURSE NOTE - OBJECTIVE STATEMENT
Pt received in 6B. Pt is a&ox3, presents with nausea, vomiting, facial pain. Pt discharged from here yesterday for issues with her sinuses, currently on multiple medications. Pt currently on high dose of dilaudid at home, was taking it while she was in the ED, so there is concern for constipation even though pt states that she had a normal bowel movement yesterday. Pt having abdominal pain in RLQ and left quadrants as well. IV placed, labs drawn and sent. Medicated per orders. Awaiting further testing at this time.

## 2025-06-12 NOTE — ED PROVIDER NOTE - NSFOLLOWUPINSTRUCTIONS_ED_ALL_ED_FT
Rest  Increase fluid intake  [place]ner diet for the next 2-3 days  Follow-up with your doctor this week for re-evaluation  Return here if needed    [employer]® CareNotes®  :  [facility]        ACUTE NAUSEA AND VOMITING - General Information    Acute Nausea and Vomiting    WHAT YOU NEED TO KNOW:    What does acute mean? Acute means the nausea and vomiting starts suddenly, gets worse quickly, and lasts a short time.    What are some common causes of acute nausea and vomiting?    Food poisoning    Large amounts of alcohol    Certain medicines, too much of any medicine, or stopping a regular medicine too quickly    Early stages of pregnancy    Infection in the stomach, intestines, or other organs    Trauma to the head    Anxiety or stress    Gastroparesis (a condition that prevents your stomach from emptying properly)    Metabolic disorders, such as uremia or adrenal insufficiency  What causes acute nausea and vomiting with other signs and symptoms? You may have stomach pain or problems with digestion. You may be sweating, have pale skin, and more saliva than usual. These signs and symptoms may be caused by the following:    Problems with your heart rate, blood flow to your heart muscle, blood pressure, or stomach fluid    Increased pressure or bleeding in the brain    Swelling of the tissue covering the brain    Migraine or seizures    Inner ear disorders that cause problems with balance    Inflammation of the appendix, gallbladder, stomach, pancreas, kidneys, or other organs    Bacteria or a parasite in the digestive system    Heart attack    Stomach ulcers, or bowel blockage or twisting  How is the cause of acute nausea and vomiting diagnosed? Your healthcare provider will examine you and ask about your symptoms. Tell him or her if the vomiting was before, during, or after a meal. Your provider may ask what medicines you take, including over-the-counter medicines. You may need blood tests to check for infection or inflammation.    How is acute nausea and vomiting treated? Vomiting may go away on its own. The goal of treatment is to prevent dehydration. Treatment also depends on the cause of the nausea and vomiting. Any medical condition causing your nausea and vomiting will also be treated. You may need one or more of the following:    Medicines may be given to calm your stomach and stop your vomiting. You may also need medicines to help empty your stomach and bowels.    IV fluids may be given to replace lost fluids and electrolytes. This may be needed it you cannot drink liquids.    A nasogastric (NG) tube may be needed if your nausea and vomiting is severe. The NG tube is put into your nose and moved down your throat until it reaches your stomach. Liquid, nutrition, or medicine may be given through an NG tube. The tube may instead be attached to suction if healthcare providers need to keep your stomach empty.  What can I do to manage acute nausea and vomiting?    Rest as much as you can. Too much activity can make your nausea worse.    Drink more liquids to prevent dehydration. Take small sips. Try drinks such as ginger ale, lemonade, water, or tea. Your provider may recommend that you drink an oral rehydration solution (ORS). ORS contains water, salts, and sugar that are needed to replace the lost body fluids.    Eat smaller meals, more often. Try bland foods and avoid spices or strong flavors    Do not drink alcohol. Alcohol may upset or irritate your stomach.  Call your local emergency number (911 in the [place]) if:    You have chest pain.    You have severe pain or cramping in your abdomen.    Your vision is blurred.    You are confused, have a high fever, or a stiff neck.    You have bright red blood coming from your rectum.    Your vomit smells like bowel movement.  When should I seek immediate care?    You have a severe headache or pain.    You are dizzy, cold, and thirsty, and your eyes and mouth are dry.    You are urinating very little or not at all.    You are dizzy or lightheaded when you stand up.    You see blood or material that looks like coffee grounds in your vomit.  When should I call my doctor?    You continue to vomit for more than 48 hours.    Your nausea and vomiting does not get better or go away after you use medicine.    You have questions or concerns about your condition or care.  CARE AGREEMENT:    You have the right to help plan your care. Learn about your health condition and how it may be treated. Discuss treatment options with your healthcare providers to decide what care you want to receive. You always have the right to refuse treatment.    © [employer] 1973, 2025

## 2025-06-16 ENCOUNTER — APPOINTMENT (OUTPATIENT)
Dept: RHEUMATOLOGY | Facility: CLINIC | Age: 55
End: 2025-06-16
Payer: MEDICAID

## 2025-06-16 ENCOUNTER — LABORATORY RESULT (OUTPATIENT)
Age: 55
End: 2025-06-16

## 2025-06-16 VITALS
WEIGHT: 180 LBS | BODY MASS INDEX: 30.73 KG/M2 | DIASTOLIC BLOOD PRESSURE: 75 MMHG | SYSTOLIC BLOOD PRESSURE: 115 MMHG | TEMPERATURE: 97.1 F | RESPIRATION RATE: 16 BRPM | HEART RATE: 76 BPM | OXYGEN SATURATION: 98 % | HEIGHT: 64 IN

## 2025-06-16 PROBLEM — R22.1 NECK SWELLING: Status: ACTIVE | Noted: 2025-06-16

## 2025-06-16 PROCEDURE — G2211 COMPLEX E/M VISIT ADD ON: CPT | Mod: NC

## 2025-06-16 PROCEDURE — 99205 OFFICE O/P NEW HI 60 MIN: CPT

## 2025-06-17 ENCOUNTER — LABORATORY RESULT (OUTPATIENT)
Age: 55
End: 2025-06-17

## 2025-06-17 ENCOUNTER — APPOINTMENT (OUTPATIENT)
Dept: OTOLARYNGOLOGY | Facility: CLINIC | Age: 55
End: 2025-06-17
Payer: MEDICAID

## 2025-06-17 VITALS
BODY MASS INDEX: 30.73 KG/M2 | SYSTOLIC BLOOD PRESSURE: 109 MMHG | DIASTOLIC BLOOD PRESSURE: 73 MMHG | WEIGHT: 180 LBS | HEART RATE: 74 BPM | HEIGHT: 64 IN

## 2025-06-17 PROCEDURE — 99213 OFFICE O/P EST LOW 20 MIN: CPT | Mod: 25

## 2025-06-17 PROCEDURE — 31237 NSL/SINS NDSC SURG BX POLYPC: CPT | Mod: 50

## 2025-06-19 ENCOUNTER — EMERGENCY (EMERGENCY)
Facility: HOSPITAL | Age: 55
LOS: 1 days | End: 2025-06-19
Attending: EMERGENCY MEDICINE | Admitting: EMERGENCY MEDICINE
Payer: MEDICARE

## 2025-06-19 VITALS
SYSTOLIC BLOOD PRESSURE: 127 MMHG | DIASTOLIC BLOOD PRESSURE: 62 MMHG | HEIGHT: 64 IN | HEART RATE: 92 BPM | TEMPERATURE: 99 F | WEIGHT: 179.9 LBS | RESPIRATION RATE: 18 BRPM | OXYGEN SATURATION: 98 %

## 2025-06-19 DIAGNOSIS — Z98.89 OTHER SPECIFIED POSTPROCEDURAL STATES: Chronic | ICD-10-CM

## 2025-06-19 DIAGNOSIS — Z98.51 TUBAL LIGATION STATUS: Chronic | ICD-10-CM

## 2025-06-19 DIAGNOSIS — Z98.890 OTHER SPECIFIED POSTPROCEDURAL STATES: Chronic | ICD-10-CM

## 2025-06-19 LAB
ALBUMIN SERPL ELPH-MCNC: 4 G/DL
ALP BLD-CCNC: 55 U/L
ALT SERPL-CCNC: 29 U/L
ANION GAP SERPL CALC-SCNC: 15 MMOL/L
APPEARANCE: CLEAR
AST SERPL-CCNC: 12 U/L
BACTERIA: NEGATIVE /HPF
BASOPHILS # BLD AUTO: 0.03 K/UL
BASOPHILS NFR BLD AUTO: 0.3 %
BILIRUB SERPL-MCNC: <0.2 MG/DL
BILIRUBIN URINE: NEGATIVE
BLOOD URINE: NEGATIVE
BUN SERPL-MCNC: 24 MG/DL
CALCIUM OXALATE CRYSTALS: PRESENT
CALCIUM SERPL-MCNC: 9.6 MG/DL
CAST: 1 /LPF
CD16+CD56+ CELLS # BLD: 60 CELLS/UL
CD16+CD56+ CELLS NFR BLD: 10 %
CD19 CELLS NFR BLD: 121 CELLS/UL
CD3 CELLS # BLD: 448 CELLS/UL
CD3 CELLS NFR BLD: 71 %
CD3+CD4+ CELLS # BLD: 336 CELLS/UL
CD3+CD4+ CELLS NFR BLD: 52 %
CD3+CD4+ CELLS/CD3+CD8+ CLL SPEC: 2.91 RATIO
CD3+CD8+ CELLS # SPEC: 115 CELLS/UL
CD3+CD8+ CELLS NFR BLD: 18 %
CELLS.CD3-CD19+/CELLS IN BLOOD: 20 %
CHLORIDE SERPL-SCNC: 103 MMOL/L
CO2 SERPL-SCNC: 22 MMOL/L
COLOR: YELLOW
CREAT SERPL-MCNC: 0.8 MG/DL
CREAT SPEC-SCNC: 141 MG/DL
CREAT/PROT UR: 0.1 RATIO
CRP SERPL-MCNC: 24 MG/L
EGFRCR SERPLBLD CKD-EPI 2021: 89 ML/MIN/1.73M2
EOSINOPHIL # BLD AUTO: 0.05 K/UL
EOSINOPHIL NFR BLD AUTO: 0.5 %
EPITHELIAL CELLS: 7 /HPF
ERYTHROCYTE [SEDIMENTATION RATE] IN BLOOD BY WESTERGREN METHOD: 26 MM/HR
GLUCOSE QUALITATIVE U: NEGATIVE MG/DL
HBV CORE IGG+IGM SER QL: NONREACTIVE
HBV SURFACE AG SER QL: NONREACTIVE
HCT VFR BLD CALC: 36 %
HCV AB SER QL: NONREACTIVE
HCV S/CO RATIO: 0.16 S/CO
HGB BLD-MCNC: 11 G/DL
IMM GRANULOCYTES NFR BLD AUTO: 0.6 %
KETONES URINE: NEGATIVE MG/DL
LEUKOCYTE ESTERASE URINE: NEGATIVE
LYMPHOCYTES # BLD AUTO: 0.77 K/UL
LYMPHOCYTES NFR BLD AUTO: 7.1 %
M TB IFN-G BLD-IMP: ABNORMAL
MAN DIFF?: NORMAL
MCHC RBC-ENTMCNC: 29.8 PG
MCHC RBC-ENTMCNC: 30.6 G/DL
MCV RBC AUTO: 97.6 FL
MICROSCOPIC-UA: NORMAL
MONOCYTES # BLD AUTO: 0.19 K/UL
MONOCYTES NFR BLD AUTO: 1.8 %
NEUTROPHILS # BLD AUTO: 9.7 K/UL
NEUTROPHILS NFR BLD AUTO: 89.7 %
NITRITE URINE: NEGATIVE
PH URINE: 5.5
PLATELET # BLD AUTO: 332 K/UL
POTASSIUM SERPL-SCNC: 4.8 MMOL/L
PROT SERPL-MCNC: 6.5 G/DL
PROT UR-MCNC: 10 MG/DL
PROTEIN URINE: NORMAL MG/DL
QUANTIFERON TB PLUS MITOGEN MINUS NIL: 0.09 IU/ML
QUANTIFERON TB PLUS NIL: 0.03 IU/ML
QUANTIFERON TB PLUS TB1 MINUS NIL: 0 IU/ML
QUANTIFERON TB PLUS TB2 MINUS NIL: 0 IU/ML
RBC # BLD: 3.69 M/UL
RBC # FLD: 13.5 %
RED BLOOD CELLS URINE: NORMAL /HPF
REVIEW: NORMAL
SODIUM SERPL-SCNC: 141 MMOL/L
SPECIFIC GRAVITY URINE: >1.03
UROBILINOGEN URINE: 0.2 MG/DL
VIABILITY: NORMAL
WBC # FLD AUTO: 10.81 K/UL
WHITE BLOOD CELLS URINE: 2 /HPF

## 2025-06-19 PROCEDURE — 99284 EMERGENCY DEPT VISIT MOD MDM: CPT

## 2025-06-19 PROCEDURE — 99283 EMERGENCY DEPT VISIT LOW MDM: CPT

## 2025-06-19 RX ORDER — HYDROMORPHONE/SOD CHLOR,ISO/PF 2 MG/10 ML
4 SYRINGE (ML) INJECTION ONCE
Refills: 0 | Status: DISCONTINUED | OUTPATIENT
Start: 2025-06-19 | End: 2025-06-19

## 2025-06-19 RX ADMIN — Medication 4 MILLIGRAM(S): at 22:40

## 2025-06-19 NOTE — ED PROVIDER NOTE - OBJECTIVE STATEMENT
55 yo F presents for pain management for her chronic sinusitis pain. Pt was recently diagnosed with Wegener's vasculitis/chronic sinusitis, recent admission to [facility] hospital for course of IV Abx. Pt saw Dr. Gauthier, pain management, who sent prescription for Dilaudid 4mg on 6/17/25, pt [facility] me [facility] which [facility] in pending status awaiting prior authorization. Pt reports Dr. Gauthier sent in the PA but due to holiday today, pt was not able to get an update. States she only has 1 pill left from her hospitalization.

## 2025-06-19 NOTE — ED PROVIDER NOTE - CARE PROVIDER_API CALL
Direct  admit  from  home  A/O  x 4  communication intact  able  to make  his  needs  known  admitted  for  Left elbow  bursitis, VSS  no  SOB  no  nausea  no diarrheal  started on  IV  vancomycin  Le  elbow  swollen  redness  warm  to touch   compliant  with  current plan  of care.    Problem: Infection  Goal: Absence of Infection Signs and Symptoms  10/14/2021 2257 by Jesica Newman, RN  Outcome: No Change  10/14/2021 2212 by Jesica Newman, RN  Outcome: No Change     Problem: Pain Acute  Goal: Acceptable Pain Control and Functional Ability  10/14/2021 2257 by Jesica Newman, RN  Outcome: Improving  10/14/2021 2212 by Jesica Newman, RN  Outcome: Improving      Bryan Gauthier (DO)  Physical Medicine & Rehabilitation  [address], [address]  [place] [address]  Phone: [phone]  Fax: [phone]  Established Patient  Follow Up Time: 1-3 Days

## 2025-06-19 NOTE — ED ADULT NURSE NOTE - OBJECTIVE STATEMENT
Pt presents to the ED c/o Dilaudid refill, hx vasculitis and ran out of pain meds. Reports 4 days without meds, endorsing sinus pain that radiates all over face. Pt is well appearing, in no acute distress.

## 2025-06-19 NOTE — ED ADULT TRIAGE NOTE - CHIEF COMPLAINT QUOTE
A&Ox4 ambulatory to triage with complaints of facial pain, dx with vasculitis when seen in hospital last week, ran out of her Dilaudid and needs new prescription

## 2025-06-19 NOTE — ED PROVIDER NOTE - CLINICAL SUMMARY MEDICAL DECISION MAKING FREE TEXT BOX
55 yo F here for pain management for her chronic sinusitis pain. Pt's pending dilaudid prescription was verified. Dr. Gauthier is her pain management doctor. Will order dose of Dilaudid 4mg in ED. Pt will f/u with her insurance tomorrow morning regarding prior authorization.  [facility] registry checked, reference # [id number].

## 2025-06-19 NOTE — ED ADULT NURSE NOTE - NSICDXFAMILYHX_GEN_ALL_CORE_FT
Patient presents to the ED via EMS c/o an episode of chest pain with inspiration that lasted about one minute. Pt states this occurred at work while in a vehicle with poor air conditioning.  EMS reports pt to be hypertensive upon arrival.
FAMILY HISTORY:  FH: HTN (hypertension), mother

## 2025-06-19 NOTE — ED PROVIDER NOTE - PATIENT PORTAL LINK FT
You can access the FollowMyHealth Patient Portal offered by [facility] by registering at the following website: [email or web address]. By joining [employer]’s FollowMyHealth portal, you will also be able to view your health information using other applications (apps) compatible with our system.

## 2025-06-20 ENCOUNTER — EMERGENCY (EMERGENCY)
Facility: HOSPITAL | Age: 55
LOS: 1 days | End: 2025-06-20
Attending: STUDENT IN AN ORGANIZED HEALTH CARE EDUCATION/TRAINING PROGRAM | Admitting: STUDENT IN AN ORGANIZED HEALTH CARE EDUCATION/TRAINING PROGRAM
Payer: MEDICAID

## 2025-06-20 VITALS
SYSTOLIC BLOOD PRESSURE: 115 MMHG | DIASTOLIC BLOOD PRESSURE: 56 MMHG | TEMPERATURE: 98 F | HEART RATE: 81 BPM | HEIGHT: 64 IN | OXYGEN SATURATION: 97 % | WEIGHT: 179.9 LBS | RESPIRATION RATE: 18 BRPM

## 2025-06-20 DIAGNOSIS — Z98.890 OTHER SPECIFIED POSTPROCEDURAL STATES: Chronic | ICD-10-CM

## 2025-06-20 DIAGNOSIS — Z98.51 TUBAL LIGATION STATUS: Chronic | ICD-10-CM

## 2025-06-20 DIAGNOSIS — Z98.89 OTHER SPECIFIED POSTPROCEDURAL STATES: Chronic | ICD-10-CM

## 2025-06-20 PROCEDURE — 99283 EMERGENCY DEPT VISIT LOW MDM: CPT

## 2025-06-20 RX ORDER — OXYCODONE HYDROCHLORIDE 30 MG/1
1 TABLET ORAL
Qty: 12 | Refills: 0
Start: 2025-06-20

## 2025-06-20 RX ORDER — HYDROMORPHONE/SOD CHLOR,ISO/PF 2 MG/10 ML
4 SYRINGE (ML) INJECTION ONCE
Refills: 0 | Status: DISCONTINUED | OUTPATIENT
Start: 2025-06-20 | End: 2025-06-20

## 2025-06-20 RX ADMIN — Medication 4 MILLIGRAM(S): at 15:58

## 2025-06-20 NOTE — ED PROVIDER NOTE - PATIENT PORTAL LINK FT
You can access the FollowMyHealth Patient Portal offered by [facility] by registering at the following website: [email or web address]. By joining [employer]’s FollowMyHealth portal, you will also be able to view your health information using other applications (apps) compatible with our system.

## 2025-06-20 NOTE — ED PROVIDER NOTE - CLINICAL SUMMARY MEDICAL DECISION MAKING FREE TEXT BOX
Pt given dose of dilaudid in ED.  Pt istop reviewed and no active dilaudid Rx's.  will send small Rx for oxycodone to the pharmacy for patient. instructed to f/up with pcp. instructed to return for any new/worsening symptoms. no indicaiton for admission at this time.  .

## 2025-06-20 NOTE — ED ADULT NURSE NOTE - OBJECTIVE STATEMENT
patient received ED , A&O-4 Ambulate as baseline, patient complain about facial pain, patient states she was seen in the ED yesterday for faciaal pain, patient complain of  worsening facial pain and needs to admitted to the hospital. MD made aware about it.

## 2025-06-20 NOTE — ED ADULT TRIAGE NOTE - CHIEF COMPLAINT QUOTE
pt states she was seen in the ED yesterday for facial pain. pt states she has worsening facial pain and needs to admitted to the hospital

## 2025-06-20 NOTE — ED PROVIDER NOTE - OBJECTIVE STATEMENT
Pt is a 53 yo F with chronic facial pain. pt was recently admitted here and treated for vasculitis of the sinuses. Pt sent home on po meds but has been having trouble obtaining her pain medicine Rx's. tp was here for the same last night but still could not get her dilaudid Rx today so came back. no fever/chills. no other complaints.

## 2025-06-20 NOTE — ED ADULT NURSE NOTE - NSFALLHARMRISKINTERV_ED_ALL_ED

## 2025-06-23 RX ORDER — TRAMADOL HYDROCHLORIDE 25 MG/1
25 TABLET, COATED ORAL EVERY 6 HOURS
Qty: 8 | Refills: 0 | Status: ACTIVE | COMMUNITY
Start: 2025-06-23 | End: 1900-01-01

## 2025-06-28 ENCOUNTER — APPOINTMENT (OUTPATIENT)
Dept: CT IMAGING | Facility: CLINIC | Age: 55
End: 2025-06-28

## 2025-06-28 PROCEDURE — 71250 CT THORAX DX C-: CPT

## 2025-06-28 PROCEDURE — 70490 CT SOFT TISSUE NECK W/O DYE: CPT

## 2025-07-07 ENCOUNTER — NON-APPOINTMENT (OUTPATIENT)
Age: 55
End: 2025-07-07

## 2025-07-07 ENCOUNTER — APPOINTMENT (OUTPATIENT)
Dept: RHEUMATOLOGY | Facility: CLINIC | Age: 55
End: 2025-07-07
Payer: MEDICAID

## 2025-07-07 VITALS
HEART RATE: 83 BPM | OXYGEN SATURATION: 98 % | WEIGHT: 175 LBS | SYSTOLIC BLOOD PRESSURE: 104 MMHG | HEIGHT: 64 IN | BODY MASS INDEX: 29.88 KG/M2 | DIASTOLIC BLOOD PRESSURE: 71 MMHG

## 2025-07-07 PROCEDURE — 99215 OFFICE O/P EST HI 40 MIN: CPT

## 2025-07-07 PROCEDURE — G2211 COMPLEX E/M VISIT ADD ON: CPT | Mod: NC

## 2025-07-07 RX ORDER — PREDNISONE 5 MG/1
5 TABLET ORAL DAILY
Qty: 90 | Refills: 0 | Status: ACTIVE | COMMUNITY
Start: 2025-07-07 | End: 1900-01-01

## 2025-07-07 RX ORDER — METHOTREXATE 2.5 MG/1
2.5 TABLET ORAL
Qty: 48 | Refills: 0 | Status: ACTIVE | COMMUNITY
Start: 2025-07-07 | End: 1900-01-01

## 2025-07-09 ENCOUNTER — OFFICE (OUTPATIENT)
Dept: URBAN - METROPOLITAN AREA CLINIC 109 | Facility: CLINIC | Age: 55
Setting detail: OPHTHALMOLOGY
End: 2025-07-09
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
## 2025-07-09 PROCEDURE — NO SHOW FE NO SHOW FEE: Performed by: OPHTHALMOLOGY

## 2025-07-15 ENCOUNTER — EMERGENCY (EMERGENCY)
Facility: HOSPITAL | Age: 55
LOS: 1 days | End: 2025-07-15
Attending: STUDENT IN AN ORGANIZED HEALTH CARE EDUCATION/TRAINING PROGRAM | Admitting: STUDENT IN AN ORGANIZED HEALTH CARE EDUCATION/TRAINING PROGRAM
Payer: MEDICAID

## 2025-07-15 VITALS
HEIGHT: 64 IN | OXYGEN SATURATION: 96 % | WEIGHT: 175.05 LBS | DIASTOLIC BLOOD PRESSURE: 70 MMHG | TEMPERATURE: 98 F | SYSTOLIC BLOOD PRESSURE: 119 MMHG | RESPIRATION RATE: 95 BRPM | HEART RATE: 84 BPM

## 2025-07-15 VITALS
HEART RATE: 82 BPM | TEMPERATURE: 98 F | DIASTOLIC BLOOD PRESSURE: 78 MMHG | SYSTOLIC BLOOD PRESSURE: 118 MMHG | RESPIRATION RATE: 18 BRPM | OXYGEN SATURATION: 99 %

## 2025-07-15 DIAGNOSIS — Z98.890 OTHER SPECIFIED POSTPROCEDURAL STATES: Chronic | ICD-10-CM

## 2025-07-15 DIAGNOSIS — Z98.51 TUBAL LIGATION STATUS: Chronic | ICD-10-CM

## 2025-07-15 DIAGNOSIS — Z98.89 OTHER SPECIFIED POSTPROCEDURAL STATES: Chronic | ICD-10-CM

## 2025-07-15 LAB
ALBUMIN SERPL ELPH-MCNC: 3.4 G/DL — SIGNIFICANT CHANGE UP (ref 3.3–5)
ALP SERPL-CCNC: 65 U/L — SIGNIFICANT CHANGE UP (ref 40–120)
ALT FLD-CCNC: 22 U/L — SIGNIFICANT CHANGE UP (ref 12–78)
ANION GAP SERPL CALC-SCNC: 4 MMOL/L — LOW (ref 5–17)
APTT BLD: 28.7 SEC — SIGNIFICANT CHANGE UP (ref 26.1–36.8)
AST SERPL-CCNC: 7 U/L — LOW (ref 15–37)
BASOPHILS # BLD AUTO: 0.02 K/UL — SIGNIFICANT CHANGE UP (ref 0–0.2)
BASOPHILS NFR BLD AUTO: 0.2 % — SIGNIFICANT CHANGE UP (ref 0–2)
BILIRUB SERPL-MCNC: 0.3 MG/DL — SIGNIFICANT CHANGE UP (ref 0.2–1.2)
BLD GP AB SCN SERPL QL: SIGNIFICANT CHANGE UP
BUN SERPL-MCNC: 12 MG/DL — SIGNIFICANT CHANGE UP (ref 7–23)
CALCIUM SERPL-MCNC: 8.8 MG/DL — SIGNIFICANT CHANGE UP (ref 8.5–10.1)
CHLORIDE SERPL-SCNC: 113 MMOL/L — HIGH (ref 96–108)
CO2 SERPL-SCNC: 25 MMOL/L — SIGNIFICANT CHANGE UP (ref 22–31)
CREAT SERPL-MCNC: 0.8 MG/DL — SIGNIFICANT CHANGE UP (ref 0.5–1.3)
EGFR: 89 ML/MIN/1.73M2 — SIGNIFICANT CHANGE UP
EGFR: 89 ML/MIN/1.73M2 — SIGNIFICANT CHANGE UP
EOSINOPHIL # BLD AUTO: 0.02 K/UL — SIGNIFICANT CHANGE UP (ref 0–0.5)
EOSINOPHIL NFR BLD AUTO: 0.2 % — SIGNIFICANT CHANGE UP (ref 0–6)
ERYTHROCYTE [SEDIMENTATION RATE] IN BLOOD: 22 MM/HR — HIGH (ref 0–20)
FLUAV AG NPH QL: SIGNIFICANT CHANGE UP
FLUBV AG NPH QL: SIGNIFICANT CHANGE UP
GLUCOSE SERPL-MCNC: 135 MG/DL — HIGH (ref 70–99)
HCT VFR BLD CALC: 37.3 % — SIGNIFICANT CHANGE UP (ref 34.5–45)
HGB BLD-MCNC: 12.2 G/DL — SIGNIFICANT CHANGE UP (ref 11.5–15.5)
IMM GRANULOCYTES # BLD AUTO: 0.05 K/UL — SIGNIFICANT CHANGE UP (ref 0–0.07)
IMM GRANULOCYTES NFR BLD AUTO: 0.6 % — SIGNIFICANT CHANGE UP (ref 0–0.9)
INR BLD: 1.06 RATIO — SIGNIFICANT CHANGE UP (ref 0.85–1.16)
LYMPHOCYTES # BLD AUTO: 0.81 K/UL — LOW (ref 1–3.3)
LYMPHOCYTES NFR BLD AUTO: 10 % — LOW (ref 13–44)
MAGNESIUM SERPL-MCNC: 2.2 MG/DL — SIGNIFICANT CHANGE UP (ref 1.6–2.6)
MCHC RBC-ENTMCNC: 29.6 PG — SIGNIFICANT CHANGE UP (ref 27–34)
MCHC RBC-ENTMCNC: 32.7 G/DL — SIGNIFICANT CHANGE UP (ref 32–36)
MCV RBC AUTO: 90.5 FL — SIGNIFICANT CHANGE UP (ref 80–100)
MONOCYTES # BLD AUTO: 0.14 K/UL — SIGNIFICANT CHANGE UP (ref 0–0.9)
MONOCYTES NFR BLD AUTO: 1.7 % — LOW (ref 2–14)
NEUTROPHILS # BLD AUTO: 7.09 K/UL — SIGNIFICANT CHANGE UP (ref 1.8–7.4)
NEUTROPHILS NFR BLD AUTO: 87.3 % — HIGH (ref 43–77)
NRBC # BLD AUTO: 0 K/UL — SIGNIFICANT CHANGE UP (ref 0–0)
NRBC # FLD: 0 K/UL — SIGNIFICANT CHANGE UP (ref 0–0)
NRBC BLD AUTO-RTO: 0 /100 WBCS — SIGNIFICANT CHANGE UP (ref 0–0)
PLATELET # BLD AUTO: 298 K/UL — SIGNIFICANT CHANGE UP (ref 150–400)
PMV BLD: 10.2 FL — SIGNIFICANT CHANGE UP (ref 7–13)
POTASSIUM SERPL-MCNC: 4 MMOL/L — SIGNIFICANT CHANGE UP (ref 3.5–5.3)
POTASSIUM SERPL-SCNC: 4 MMOL/L — SIGNIFICANT CHANGE UP (ref 3.5–5.3)
PROT SERPL-MCNC: 6.9 G/DL — SIGNIFICANT CHANGE UP (ref 6–8.3)
PROTHROM AB SERPL-ACNC: 12.4 SEC — SIGNIFICANT CHANGE UP (ref 9.9–13.4)
RBC # BLD: 4.12 M/UL — SIGNIFICANT CHANGE UP (ref 3.8–5.2)
RBC # FLD: 13 % — SIGNIFICANT CHANGE UP (ref 10.3–14.5)
RSV RNA NPH QL NAA+NON-PROBE: SIGNIFICANT CHANGE UP
S PYO DNA THROAT QL NAA+PROBE: SIGNIFICANT CHANGE UP
SARS-COV-2 RNA SPEC QL NAA+PROBE: SIGNIFICANT CHANGE UP
SODIUM SERPL-SCNC: 142 MMOL/L — SIGNIFICANT CHANGE UP (ref 135–145)
SOURCE RESPIRATORY: SIGNIFICANT CHANGE UP
WBC # BLD: 8.13 K/UL — SIGNIFICANT CHANGE UP (ref 3.8–10.5)
WBC # FLD AUTO: 8.13 K/UL — SIGNIFICANT CHANGE UP (ref 3.8–10.5)

## 2025-07-15 PROCEDURE — 96374 THER/PROPH/DIAG INJ IV PUSH: CPT | Mod: XU

## 2025-07-15 PROCEDURE — 99285 EMERGENCY DEPT VISIT HI MDM: CPT

## 2025-07-15 PROCEDURE — 86850 RBC ANTIBODY SCREEN: CPT

## 2025-07-15 PROCEDURE — 80053 COMPREHEN METABOLIC PANEL: CPT

## 2025-07-15 PROCEDURE — 87637 SARSCOV2&INF A&B&RSV AMP PRB: CPT

## 2025-07-15 PROCEDURE — 85610 PROTHROMBIN TIME: CPT

## 2025-07-15 PROCEDURE — 86900 BLOOD TYPING SEROLOGIC ABO: CPT

## 2025-07-15 PROCEDURE — 85730 THROMBOPLASTIN TIME PARTIAL: CPT

## 2025-07-15 PROCEDURE — 71046 X-RAY EXAM CHEST 2 VIEWS: CPT

## 2025-07-15 PROCEDURE — 85025 COMPLETE CBC W/AUTO DIFF WBC: CPT

## 2025-07-15 PROCEDURE — 36415 COLL VENOUS BLD VENIPUNCTURE: CPT

## 2025-07-15 PROCEDURE — 99284 EMERGENCY DEPT VISIT MOD MDM: CPT | Mod: 25

## 2025-07-15 PROCEDURE — 87651 STREP A DNA AMP PROBE: CPT

## 2025-07-15 PROCEDURE — 85652 RBC SED RATE AUTOMATED: CPT

## 2025-07-15 PROCEDURE — 86901 BLOOD TYPING SEROLOGIC RH(D): CPT

## 2025-07-15 PROCEDURE — 94640 AIRWAY INHALATION TREATMENT: CPT

## 2025-07-15 PROCEDURE — 96375 TX/PRO/DX INJ NEW DRUG ADDON: CPT | Mod: XU

## 2025-07-15 PROCEDURE — 71046 X-RAY EXAM CHEST 2 VIEWS: CPT | Mod: 26

## 2025-07-15 PROCEDURE — 70491 CT SOFT TISSUE NECK W/DYE: CPT

## 2025-07-15 PROCEDURE — 83735 ASSAY OF MAGNESIUM: CPT

## 2025-07-15 PROCEDURE — 70491 CT SOFT TISSUE NECK W/DYE: CPT | Mod: 26

## 2025-07-15 PROCEDURE — 87798 DETECT AGENT NOS DNA AMP: CPT

## 2025-07-15 RX ORDER — LIDOCAINE HYDROCHLORIDE 20 MG/ML
10 JELLY TOPICAL ONCE
Refills: 0 | Status: COMPLETED | OUTPATIENT
Start: 2025-07-15 | End: 2025-07-15

## 2025-07-15 RX ORDER — DEXAMETHASONE 0.5 MG/1
10 TABLET ORAL ONCE
Refills: 0 | Status: COMPLETED | OUTPATIENT
Start: 2025-07-15 | End: 2025-07-15

## 2025-07-15 RX ORDER — DEXAMETHASONE 0.5 MG/1
10 TABLET ORAL ONCE
Refills: 0 | Status: DISCONTINUED | OUTPATIENT
Start: 2025-07-15 | End: 2025-07-15

## 2025-07-15 RX ORDER — HYDROMORPHONE/SOD CHLOR,ISO/PF 2 MG/10 ML
6 SYRINGE (ML) INJECTION ONCE
Refills: 0 | Status: DISCONTINUED | OUTPATIENT
Start: 2025-07-15 | End: 2025-07-15

## 2025-07-15 RX ORDER — HYDROMORPHONE/SOD CHLOR,ISO/PF 2 MG/10 ML
4 SYRINGE (ML) INJECTION ONCE
Refills: 0 | Status: DISCONTINUED | OUTPATIENT
Start: 2025-07-15 | End: 2025-07-15

## 2025-07-15 RX ORDER — IPRATROPIUM BROMIDE AND ALBUTEROL SULFATE .5; 2.5 MG/3ML; MG/3ML
3 SOLUTION RESPIRATORY (INHALATION) ONCE
Refills: 0 | Status: COMPLETED | OUTPATIENT
Start: 2025-07-15 | End: 2025-07-15

## 2025-07-15 RX ADMIN — DEXAMETHASONE 102 MILLIGRAM(S): 0.5 TABLET ORAL at 16:11

## 2025-07-15 RX ADMIN — IPRATROPIUM BROMIDE AND ALBUTEROL SULFATE 3 MILLILITER(S): .5; 2.5 SOLUTION RESPIRATORY (INHALATION) at 15:43

## 2025-07-15 RX ADMIN — Medication 4 MILLIGRAM(S): at 20:19

## 2025-07-15 RX ADMIN — Medication 6 MILLIGRAM(S): at 16:30

## 2025-07-15 RX ADMIN — LIDOCAINE HYDROCHLORIDE 10 MILLILITER(S): 20 JELLY TOPICAL at 15:44

## 2025-07-15 RX ADMIN — Medication 6 MILLIGRAM(S): at 15:43

## 2025-07-15 NOTE — ED PROVIDER NOTE - CLINICAL SUMMARY MEDICAL DECISION MAKING FREE TEXT BOX
Here complaining of throat pain, difficulty breathing and swallowing. check labs, ct, treat symptoms as needed, x-ray, swabs, may need ent evaluation / transfer.

## 2025-07-15 NOTE — ED PROVIDER NOTE - NSFOLLOWUPINSTRUCTIONS_ED_ALL_ED_FT
Please follow up with your Primary Care Physician and any specialists as discussed- follow up with Rheum and ENT.  Please take your medications as prescribed and or instructed.  If your symptoms persist or worsen, please seek care. Either return to the Emergency Department, go to urgent care or see your primary care doctor.  Please refer to general information and instructions attached or below:     Pharyngitis    WHAT YOU NEED TO KNOW:    Pharyngitis, or sore throat, is inflammation of the tissues and structures in your pharynx (throat). Pharyngitis is most often caused by bacteria. It may also be caused by a cold or flu virus. Other causes include smoking, allergies, or acid reflux.     DISCHARGE INSTRUCTIONS:    Call 911 for any of the following:     You have trouble breathing or swallowing because your throat is swollen or sore.        Return to the emergency department if:     You are drooling because it hurts too much to swallow.      Your fever is higher than 102°F (39°C) or lasts longer than 3 days.      You are confused.      You taste blood in your throat.    Contact your healthcare provider if:     Your throat pain gets worse.      You have a painful lump in your throat that does not go away after 5 days.      Your symptoms do not improve after 5 days.      You have questions or concerns about your condition or care.    Medicines: Viral pharyngitis will go away on its own without treatment. Your sore throat should start to feel better in 3 to 5 days for both viral and bacterial infections. You may need any of the following:     Antibiotics treat a bacterial infection.      NSAIDs, such as ibuprofen, help decrease swelling, pain, and fever. NSAIDs can cause stomach bleeding or kidney problems in certain people. If you take blood thinner medicine, always ask your healthcare provider if NSAIDs are safe for you. Always read the medicine label and follow directions.      Acetaminophen decreases pain and fever. It is available without a doctor's order. Ask how much to take and how often to take it. Follow directions. Acetaminophen can cause liver damage if not taken correctly.      Take your medicine as directed. Contact your healthcare provider if you think your medicine is not helping or if you have side effects. Tell him or her if you are allergic to any medicine. Keep a list of the medicines, vitamins, and herbs you take. Include the amounts, and when and why you take them. Bring the list or the pill bottles to follow-up visits. Carry your medicine list with you in case of an emergency.    Manage your symptoms:     Gargle salt water. Mix ¼ teaspoon salt in an 8 ounce glass of warm water and gargle. This may help decrease swelling in your throat.      Drink liquids as directed. You may need to drink more liquids than usual. Liquids may help soothe your throat and prevent dehydration. Ask how much liquid to drink each day and which liquids are best for you.      Use a cool-steam humidifier to help moisten the air in your room and calm your cough.      Soothe your throat with cough drops, ice, soft foods, or popsicles.    Prevent the spread of pharyngitis: Cover your mouth and nose when you cough or sneeze. Do not share food or drinks. Wash your hands often. Use soap and water. If soap and water are unavailable, use an alcohol based hand .     Follow up with your healthcare provider as directed: Write down your questions so you remember to ask them during your visits.

## 2025-07-15 NOTE — ED ADULT NURSE NOTE - CHIEF COMPLAINT QUOTE
I have asthma and I feel like I cannot breathe today, and I feel like there is something in my throat and I cannot swallow food or water. (patient states she has vasculitis)

## 2025-07-15 NOTE — ED PROVIDER NOTE - PATIENT PORTAL LINK FT
You can access the FollowMyHealth Patient Portal offered by [facility] by registering at the following website: [email or web address]. By joining [employer]’s FollowMyHealth portal, you will also be able to view your health information using other applications (apps) compatible with our system.

## 2025-07-15 NOTE — ED ADULT NURSE NOTE - OBJECTIVE STATEMENT
patient received in ED, A&O-4, Ambulate as baseline, As per patient state I have asthma and I feel like I cannot breathe today, Patient complain that she  feel like there is something in her throat and she cannot swallow food or water. patient also  states she has vasculitis, patient didn't took her prescribe medication today coz she cant swallow, patient also complain of lethargic, patient denied of having chest pain, SOB, Headache.

## 2025-07-15 NOTE — ED ADULT TRIAGE NOTE - CHIEF COMPLAINT QUOTE
Information: This plan will allow you to select a body location and will not render the procedure on the note output. It will note the location you select in the morphology. Detail Level: Simple I have asthma and I feel like I cannot breathe today, and I feel like there is something in my throat and I cannot swallow food or water. (patient states she has vasculitis)

## 2025-07-15 NOTE — ED PROVIDER NOTE - PROVIDER TOKENS
PROVIDER:[TOKEN:[[id number]],FOLLOWUP:[Urgent],ESTABLISHEDPATIENT:[T]],PROVIDER:[TOKEN:[[id number]],FOLLOWUP:[Urgent],ESTABLISHEDPATIENT:[T]]

## 2025-07-15 NOTE — ED PROVIDER NOTE - PHYSICAL EXAMINATION
Vital signs as available reviewed.  General:  No acute distress.  Head:  Normocephalic, atraumatic.  Eyes:  Conjunctiva pink, no icterus.  ENT: stridor with forced air. elongated uvula, no exudates.  Cardiovascular:  Regular rate, no obvious murmur.  Respiratory:  slight bilateral wheeze as well as transmitted upper airway sounds with wheeze worse in right mid chest.  Abdomen:  Soft, non-tender.  Musculoskeletal:  No obvious deformity.  Neurologic: Alert and oriented, moving all extremities.  Skin:  Warm and dry.

## 2025-07-15 NOTE — ED ADULT NURSE NOTE - NSFALLUNIVINTERV_ED_ALL_ED
Bed/Stretcher in lowest position, wheels locked, appropriate side rails in place/Call bell, personal items and telephone in reach/Instruct patient to call for assistance before getting out of bed/chair/stretcher/Non-slip footwear applied when patient is off stretcher/[place] to call system/Physically safe environment - no spills, clutter or unnecessary equipment/Purposeful proactive rounding/Room/bathroom lighting operational, light cord in reach

## 2025-07-15 NOTE — ED PROVIDER NOTE - CARE PROVIDERS DIRECT ADDRESSES
[email or web address]

## 2025-07-15 NOTE — ED PROVIDER NOTE - PROGRESS NOTE DETAILS
Patient still with some foreign body sensation in throat.  But patient able to swallow. Asking for food. Will discharge. Strep negative.

## 2025-07-15 NOTE — ED PROVIDER NOTE - CARE PROVIDER_API CALL
Kris Monsalve)  Otolaryngology Head And Neck Surgery  [address], [address]  [place] [address]  Phone: [phone]  Fax: [phone]  Established Patient  Follow Up Time: Urgent    Kasey Suarez  Rheumatology  [address]  [place] [address]  Phone: [phone]  Fax: [phone]  Established Patient  Follow Up Time: Urgent

## 2025-07-15 NOTE — ED PROVIDER NOTE - OBJECTIVE STATEMENT
54 F history of recently diagnosed ANCA-Vasculitis, asthma here complaining of difficulty breathing and swallowing with throat pain and sensation of lump. Patient states she woke up with the feeling that something was stuck in her throat.  Patient tried spitting up and spit out bubbles.  Patient states whenever she drinks water it comes right back up.  Patient also noted some wheezing and so tried taking neb treatments but felt that they did not work.  Patient has not been unable to take any of her medications.  Patient denies associated fevers or chills. Medications include Dilaudid 4 mg every 4 hours, morphine 15 mg every 6 hour, methotrexate, prednisone, folic acid, Singulair.

## 2025-07-16 ENCOUNTER — NON-APPOINTMENT (OUTPATIENT)
Age: 55
End: 2025-07-16

## 2025-07-16 PROBLEM — R13.10 DYSPHAGIA: Status: ACTIVE | Noted: 2025-07-16

## 2025-07-16 RX ORDER — FOLIC ACID 1 MG/1
1 TABLET ORAL DAILY
Qty: 1 | Refills: 3 | Status: ACTIVE | COMMUNITY
Start: 2025-07-07 | End: 1900-01-01

## 2025-08-06 ENCOUNTER — APPOINTMENT (OUTPATIENT)
Facility: CLINIC | Age: 55
End: 2025-08-06

## 2025-08-12 ENCOUNTER — LABORATORY RESULT (OUTPATIENT)
Age: 55
End: 2025-08-12

## 2025-08-12 ENCOUNTER — APPOINTMENT (OUTPATIENT)
Dept: RHEUMATOLOGY | Facility: CLINIC | Age: 55
End: 2025-08-12
Payer: MEDICAID

## 2025-08-12 VITALS
OXYGEN SATURATION: 98 % | DIASTOLIC BLOOD PRESSURE: 84 MMHG | HEART RATE: 83 BPM | SYSTOLIC BLOOD PRESSURE: 137 MMHG | HEIGHT: 64 IN | RESPIRATION RATE: 16 BRPM | BODY MASS INDEX: 29.02 KG/M2 | WEIGHT: 170 LBS

## 2025-08-12 DIAGNOSIS — J34.89 OTHER SPECIFIED DISORDERS OF NOSE AND NASAL SINUSES: ICD-10-CM

## 2025-08-12 DIAGNOSIS — I77.82 ANTINEUTROPHILIC CYTOPLASMIC ANTIBODY [ANCA] VASCULITIS: ICD-10-CM

## 2025-08-12 DIAGNOSIS — J32.9 CHRONIC SINUSITIS, UNSPECIFIED: ICD-10-CM

## 2025-08-12 DIAGNOSIS — Z79.899 OTHER LONG TERM (CURRENT) DRUG THERAPY: ICD-10-CM

## 2025-08-12 DIAGNOSIS — J34.0 ABSCESS, FURUNCLE AND CARBUNCLE OF NOSE: ICD-10-CM

## 2025-08-12 PROCEDURE — 99214 OFFICE O/P EST MOD 30 MIN: CPT

## 2025-08-12 PROCEDURE — G2211 COMPLEX E/M VISIT ADD ON: CPT | Mod: NC

## 2025-08-12 RX ORDER — GABAPENTIN 300 MG/1
300 CAPSULE ORAL
Qty: 30 | Refills: 3 | Status: ACTIVE | COMMUNITY
Start: 2025-08-12 | End: 1900-01-01

## 2025-08-13 LAB
ALBUMIN SERPL ELPH-MCNC: 4.1 G/DL
ALP BLD-CCNC: 55 U/L
ALT SERPL-CCNC: 17 U/L
ANION GAP SERPL CALC-SCNC: 14 MMOL/L
APPEARANCE: CLEAR
AST SERPL-CCNC: 11 U/L
BACTERIA: NEGATIVE /HPF
BASOPHILS # BLD AUTO: 0.04 K/UL
BASOPHILS NFR BLD AUTO: 0.5 %
BILIRUB SERPL-MCNC: <0.2 MG/DL
BILIRUBIN URINE: NEGATIVE
BLOOD URINE: NEGATIVE
BUN SERPL-MCNC: 22 MG/DL
CALCIUM SERPL-MCNC: 9.6 MG/DL
CAST: 0 /LPF
CHLORIDE SERPL-SCNC: 103 MMOL/L
CO2 SERPL-SCNC: 24 MMOL/L
COLOR: YELLOW
CREAT SERPL-MCNC: 0.55 MG/DL
CREAT SPEC-SCNC: 64 MG/DL
CREAT/PROT UR: 0.1 RATIO
CRP SERPL-MCNC: 11 MG/L
EGFRCR SERPLBLD CKD-EPI 2021: 110 ML/MIN/1.73M2
EOSINOPHIL # BLD AUTO: 0.46 K/UL
EOSINOPHIL NFR BLD AUTO: 5.6 %
EPITHELIAL CELLS: 4 /HPF
ERYTHROCYTE [SEDIMENTATION RATE] IN BLOOD BY WESTERGREN METHOD: 32 MM/HR
GLUCOSE QUALITATIVE U: NEGATIVE MG/DL
HCT VFR BLD CALC: 38 %
HGB BLD-MCNC: 12.1 G/DL
IMM GRANULOCYTES NFR BLD AUTO: 0.6 %
KETONES URINE: NEGATIVE MG/DL
LEUKOCYTE ESTERASE URINE: ABNORMAL
LYMPHOCYTES # BLD AUTO: 2.96 K/UL
LYMPHOCYTES NFR BLD AUTO: 35.9 %
MAN DIFF?: NORMAL
MCHC RBC-ENTMCNC: 29.2 PG
MCHC RBC-ENTMCNC: 31.8 G/DL
MCV RBC AUTO: 91.8 FL
MICROSCOPIC-UA: NORMAL
MONOCYTES # BLD AUTO: 0.69 K/UL
MONOCYTES NFR BLD AUTO: 8.4 %
NEUTROPHILS # BLD AUTO: 4.04 K/UL
NEUTROPHILS NFR BLD AUTO: 49 %
NITRITE URINE: NEGATIVE
PH URINE: 6
PLATELET # BLD AUTO: 325 K/UL
POTASSIUM SERPL-SCNC: 4.4 MMOL/L
PROT SERPL-MCNC: 6.7 G/DL
PROT UR-MCNC: 7 MG/DL
PROTEIN URINE: NEGATIVE MG/DL
RBC # BLD: 4.14 M/UL
RBC # FLD: 13.7 %
RED BLOOD CELLS URINE: 2 /HPF
SODIUM SERPL-SCNC: 140 MMOL/L
SPECIFIC GRAVITY URINE: 1.02
UROBILINOGEN URINE: 0.2 MG/DL
WBC # FLD AUTO: 8.24 K/UL
WHITE BLOOD CELLS URINE: 1 /HPF

## 2025-08-25 DIAGNOSIS — Z79.899 OTHER LONG TERM (CURRENT) DRUG THERAPY: ICD-10-CM

## 2025-08-26 ENCOUNTER — TRANSCRIPTION ENCOUNTER (OUTPATIENT)
Age: 55
End: 2025-08-26

## 2025-08-29 ENCOUNTER — APPOINTMENT (OUTPATIENT)
Dept: PHYSICAL MEDICINE AND REHAB | Facility: CLINIC | Age: 55
End: 2025-08-29

## 2025-09-06 ENCOUNTER — NON-APPOINTMENT (OUTPATIENT)
Age: 55
End: 2025-09-06

## 2025-09-07 LAB
ALBUMIN SERPL ELPH-MCNC: 3.9 G/DL
ALP BLD-CCNC: 51 U/L
ALT SERPL-CCNC: 27 U/L
ANION GAP SERPL CALC-SCNC: 13 MMOL/L
AST SERPL-CCNC: 16 U/L
BASOPHILS # BLD AUTO: 0.05 K/UL
BASOPHILS NFR BLD AUTO: 0.5 %
BILIRUB SERPL-MCNC: <0.2 MG/DL
BUN SERPL-MCNC: 18 MG/DL
CALCIUM SERPL-MCNC: 9.5 MG/DL
CHLORIDE SERPL-SCNC: 107 MMOL/L
CO2 SERPL-SCNC: 24 MMOL/L
CREAT SERPL-MCNC: 0.64 MG/DL
EGFRCR SERPLBLD CKD-EPI 2021: 106 ML/MIN/1.73M2
EOSINOPHIL # BLD AUTO: 0.3 K/UL
EOSINOPHIL NFR BLD AUTO: 3 %
HCT VFR BLD CALC: 35 %
HGB BLD-MCNC: 11.3 G/DL
IMM GRANULOCYTES NFR BLD AUTO: 0.4 %
LYMPHOCYTES # BLD AUTO: 4.44 K/UL
LYMPHOCYTES NFR BLD AUTO: 43.7 %
MAN DIFF?: NORMAL
MCHC RBC-ENTMCNC: 30.3 PG
MCHC RBC-ENTMCNC: 32.3 G/DL
MCV RBC AUTO: 93.8 FL
MONOCYTES # BLD AUTO: 0.76 K/UL
MONOCYTES NFR BLD AUTO: 7.5 %
NEUTROPHILS # BLD AUTO: 4.56 K/UL
NEUTROPHILS NFR BLD AUTO: 44.9 %
PLATELET # BLD AUTO: 300 K/UL
POTASSIUM SERPL-SCNC: 4.1 MMOL/L
PROT SERPL-MCNC: 6.4 G/DL
RBC # BLD: 3.73 M/UL
RBC # FLD: 15 %
SODIUM SERPL-SCNC: 144 MMOL/L
WBC # FLD AUTO: 10.15 K/UL

## 2025-09-09 RX ORDER — ACETAMINOPHEN 500 MG/1
500 TABLET, COATED ORAL
Qty: 30 | Refills: 0 | Status: ACTIVE | COMMUNITY
Start: 2025-09-09 | End: 1900-01-01

## 2025-09-16 ENCOUNTER — APPOINTMENT (OUTPATIENT)
Dept: OTOLARYNGOLOGY | Facility: CLINIC | Age: 55
End: 2025-09-16
Payer: MEDICAID

## 2025-09-16 VITALS
WEIGHT: 175 LBS | DIASTOLIC BLOOD PRESSURE: 84 MMHG | SYSTOLIC BLOOD PRESSURE: 137 MMHG | HEART RATE: 82 BPM | BODY MASS INDEX: 29.88 KG/M2 | HEIGHT: 64 IN

## 2025-09-16 DIAGNOSIS — J32.9 CHRONIC SINUSITIS, UNSPECIFIED: ICD-10-CM

## 2025-09-16 DIAGNOSIS — I77.82 ANTINEUTROPHILIC CYTOPLASMIC ANTIBODY [ANCA] VASCULITIS: ICD-10-CM

## 2025-09-16 DIAGNOSIS — J34.89 OTHER SPECIFIED DISORDERS OF NOSE AND NASAL SINUSES: ICD-10-CM

## 2025-09-16 PROCEDURE — 31231 NASAL ENDOSCOPY DX: CPT

## 2025-09-16 PROCEDURE — 99213 OFFICE O/P EST LOW 20 MIN: CPT | Mod: 25

## 2025-09-16 RX ORDER — MUPIROCIN 20 MG/G
2 OINTMENT TOPICAL TWICE DAILY
Qty: 1 | Refills: 2 | Status: ACTIVE | COMMUNITY
Start: 2025-09-16 | End: 1900-01-01